# Patient Record
Sex: MALE | Race: WHITE | NOT HISPANIC OR LATINO | Employment: FULL TIME | URBAN - METROPOLITAN AREA
[De-identification: names, ages, dates, MRNs, and addresses within clinical notes are randomized per-mention and may not be internally consistent; named-entity substitution may affect disease eponyms.]

---

## 2019-01-01 ENCOUNTER — TELEPHONE (OUTPATIENT)
Dept: GASTROENTEROLOGY | Facility: CLINIC | Age: 34
End: 2019-01-01

## 2019-01-01 ENCOUNTER — DOCUMENTATION ONLY (OUTPATIENT)
Dept: TRANSPLANT | Facility: CLINIC | Age: 34
End: 2019-01-01

## 2019-01-01 ENCOUNTER — TELEPHONE (OUTPATIENT)
Dept: TRANSPLANT | Facility: CLINIC | Age: 34
End: 2019-01-01

## 2019-01-01 ENCOUNTER — HOSPITAL ENCOUNTER (INPATIENT)
Facility: HOSPITAL | Age: 34
LOS: 14 days | DRG: 871 | End: 2019-12-09
Attending: HOSPITALIST | Admitting: HOSPITALIST
Payer: COMMERCIAL

## 2019-01-01 ENCOUNTER — COMMITTEE REVIEW (OUTPATIENT)
Dept: TRANSPLANT | Facility: CLINIC | Age: 34
End: 2019-01-01

## 2019-01-01 VITALS
OXYGEN SATURATION: 91 % | HEIGHT: 74 IN | TEMPERATURE: 98 F | DIASTOLIC BLOOD PRESSURE: 101 MMHG | WEIGHT: 285.06 LBS | BODY MASS INDEX: 36.58 KG/M2 | SYSTOLIC BLOOD PRESSURE: 134 MMHG

## 2019-01-01 DIAGNOSIS — K65.2 SPONTANEOUS BACTERIAL PERITONITIS: ICD-10-CM

## 2019-01-01 DIAGNOSIS — B37.9 INFECTION DUE TO CANDIDA GLABRATA: ICD-10-CM

## 2019-01-01 DIAGNOSIS — A41.9 SEVERE SEPSIS WITH ACUTE ORGAN DYSFUNCTION: ICD-10-CM

## 2019-01-01 DIAGNOSIS — K72.90 DECOMPENSATED HEPATIC CIRRHOSIS: Primary | ICD-10-CM

## 2019-01-01 DIAGNOSIS — K74.60 DECOMPENSATED HEPATIC CIRRHOSIS: Primary | ICD-10-CM

## 2019-01-01 DIAGNOSIS — E87.1 HYPONATREMIA: ICD-10-CM

## 2019-01-01 DIAGNOSIS — K72.00 ACUTE LIVER FAILURE WITHOUT HEPATIC COMA: ICD-10-CM

## 2019-01-01 DIAGNOSIS — N19 UREMIA: ICD-10-CM

## 2019-01-01 DIAGNOSIS — K76.7 HEPATORENAL SYNDROME: ICD-10-CM

## 2019-01-01 DIAGNOSIS — F10.20 SEVERE ALCOHOL DEPENDENCE: ICD-10-CM

## 2019-01-01 DIAGNOSIS — Z91.89 AT RISK FOR LONG QT SYNDROME: ICD-10-CM

## 2019-01-01 DIAGNOSIS — K70.31 ALCOHOLIC CIRRHOSIS OF LIVER WITH ASCITES: ICD-10-CM

## 2019-01-01 DIAGNOSIS — Z01.818 PRE-TRANSPLANT EVALUATION FOR LIVER TRANSPLANT: ICD-10-CM

## 2019-01-01 DIAGNOSIS — R00.0 TACHYCARDIA: ICD-10-CM

## 2019-01-01 DIAGNOSIS — N17.0 ACUTE RENAL FAILURE WITH TUBULAR NECROSIS: ICD-10-CM

## 2019-01-01 DIAGNOSIS — N17.9 AKI (ACUTE KIDNEY INJURY): ICD-10-CM

## 2019-01-01 DIAGNOSIS — R94.31 QT PROLONGATION: ICD-10-CM

## 2019-01-01 DIAGNOSIS — R65.20 SEVERE SEPSIS WITH ACUTE ORGAN DYSFUNCTION: ICD-10-CM

## 2019-01-01 DIAGNOSIS — Z76.82 LIVER TRANSPLANT CANDIDATE: ICD-10-CM

## 2019-01-01 DIAGNOSIS — B37.7 CANDIDEMIA: ICD-10-CM

## 2019-01-01 LAB
A1 AG RBC QL: NORMAL
ABO + RH BLD: NORMAL
AFP SERPL-MCNC: 4.1 NG/ML (ref 0–8.4)
ALBUMIN FLD-MCNC: 1.1 G/DL
ALBUMIN FLD-MCNC: 1.2 G/DL
ALBUMIN SERPL BCP-MCNC: 1.5 G/DL (ref 3.5–5.2)
ALBUMIN SERPL BCP-MCNC: 1.9 G/DL (ref 3.5–5.2)
ALBUMIN SERPL BCP-MCNC: 1.9 G/DL (ref 3.5–5.2)
ALBUMIN SERPL BCP-MCNC: 2.4 G/DL (ref 3.5–5.2)
ALBUMIN SERPL BCP-MCNC: 2.5 G/DL (ref 3.5–5.2)
ALBUMIN SERPL BCP-MCNC: 2.5 G/DL (ref 3.5–5.2)
ALBUMIN SERPL BCP-MCNC: 2.7 G/DL (ref 3.5–5.2)
ALBUMIN SERPL BCP-MCNC: 2.8 G/DL (ref 3.5–5.2)
ALBUMIN SERPL BCP-MCNC: 2.9 G/DL (ref 3.5–5.2)
ALBUMIN SERPL BCP-MCNC: 2.9 G/DL (ref 3.5–5.2)
ALBUMIN SERPL BCP-MCNC: 3 G/DL (ref 3.5–5.2)
ALBUMIN SERPL BCP-MCNC: 3 G/DL (ref 3.5–5.2)
ALBUMIN SERPL BCP-MCNC: 3.1 G/DL (ref 3.5–5.2)
ALBUMIN SERPL BCP-MCNC: 3.2 G/DL (ref 3.5–5.2)
ALBUMIN SERPL BCP-MCNC: 3.3 G/DL (ref 3.5–5.2)
ALBUMIN SERPL BCP-MCNC: 3.5 G/DL (ref 3.5–5.2)
ALLENS TEST: ABNORMAL
ALP SERPL-CCNC: 181 U/L (ref 55–135)
ALP SERPL-CCNC: 184 U/L (ref 55–135)
ALP SERPL-CCNC: 194 U/L (ref 55–135)
ALP SERPL-CCNC: 196 U/L (ref 55–135)
ALP SERPL-CCNC: 197 U/L (ref 55–135)
ALP SERPL-CCNC: 200 U/L (ref 55–135)
ALP SERPL-CCNC: 201 U/L (ref 55–135)
ALP SERPL-CCNC: 209 U/L (ref 55–135)
ALP SERPL-CCNC: 212 U/L (ref 55–135)
ALP SERPL-CCNC: 253 U/L (ref 55–135)
ALP SERPL-CCNC: 258 U/L (ref 55–135)
ALP SERPL-CCNC: 263 U/L (ref 55–135)
ALP SERPL-CCNC: 273 U/L (ref 55–135)
ALP SERPL-CCNC: 276 U/L (ref 55–135)
ALP SERPL-CCNC: 289 U/L (ref 55–135)
ALP SERPL-CCNC: 301 U/L (ref 55–135)
ALP SERPL-CCNC: 329 U/L (ref 55–135)
ALT SERPL W/O P-5'-P-CCNC: 146 U/L (ref 10–44)
ALT SERPL W/O P-5'-P-CCNC: 22 U/L (ref 10–44)
ALT SERPL W/O P-5'-P-CCNC: 25 U/L (ref 10–44)
ALT SERPL W/O P-5'-P-CCNC: 26 U/L (ref 10–44)
ALT SERPL W/O P-5'-P-CCNC: 27 U/L (ref 10–44)
ALT SERPL W/O P-5'-P-CCNC: 31 U/L (ref 10–44)
ALT SERPL W/O P-5'-P-CCNC: 32 U/L (ref 10–44)
ALT SERPL W/O P-5'-P-CCNC: 33 U/L (ref 10–44)
ALT SERPL W/O P-5'-P-CCNC: 36 U/L (ref 10–44)
ALT SERPL W/O P-5'-P-CCNC: 36 U/L (ref 10–44)
ALT SERPL W/O P-5'-P-CCNC: 39 U/L (ref 10–44)
ALT SERPL W/O P-5'-P-CCNC: 43 U/L (ref 10–44)
ALT SERPL W/O P-5'-P-CCNC: 47 U/L (ref 10–44)
ALT SERPL W/O P-5'-P-CCNC: 52 U/L (ref 10–44)
ALT SERPL W/O P-5'-P-CCNC: 52 U/L (ref 10–44)
AMMONIA PLAS-SCNC: 36 UMOL/L (ref 10–50)
AMMONIA PLAS-SCNC: 55 UMOL/L (ref 10–50)
AMPHET+METHAMPHET UR QL: NEGATIVE
AMYLASE, BODY FLUID: 290 U/L
ANION GAP SERPL CALC-SCNC: 10 MMOL/L (ref 8–16)
ANION GAP SERPL CALC-SCNC: 11 MMOL/L (ref 8–16)
ANION GAP SERPL CALC-SCNC: 12 MMOL/L (ref 8–16)
ANION GAP SERPL CALC-SCNC: 13 MMOL/L (ref 8–16)
ANION GAP SERPL CALC-SCNC: 16 MMOL/L (ref 8–16)
ANION GAP SERPL CALC-SCNC: 17 MMOL/L (ref 8–16)
ANION GAP SERPL CALC-SCNC: 18 MMOL/L (ref 8–16)
ANION GAP SERPL CALC-SCNC: 19 MMOL/L (ref 8–16)
ANION GAP SERPL CALC-SCNC: 19 MMOL/L (ref 8–16)
ANION GAP SERPL CALC-SCNC: 20 MMOL/L (ref 8–16)
ANION GAP SERPL CALC-SCNC: 20 MMOL/L (ref 8–16)
ANION GAP SERPL CALC-SCNC: 21 MMOL/L (ref 8–16)
ANION GAP SERPL CALC-SCNC: 21 MMOL/L (ref 8–16)
ANION GAP SERPL CALC-SCNC: 22 MMOL/L (ref 8–16)
ANION GAP SERPL CALC-SCNC: 22 MMOL/L (ref 8–16)
ANION GAP SERPL CALC-SCNC: 23 MMOL/L (ref 8–16)
ANION GAP SERPL CALC-SCNC: 23 MMOL/L (ref 8–16)
ANION GAP SERPL CALC-SCNC: 24 MMOL/L (ref 8–16)
ANION GAP SERPL CALC-SCNC: 25 MMOL/L (ref 8–16)
ANION GAP SERPL CALC-SCNC: 26 MMOL/L (ref 8–16)
ANISOCYTOSIS BLD QL SMEAR: ABNORMAL
ANISOCYTOSIS BLD QL SMEAR: SLIGHT
APPEARANCE FLD: NORMAL
APTT BLDCRRT: 115.2 SEC (ref 21–32)
APTT BLDCRRT: 51.7 SEC (ref 21–32)
ASCENDING AORTA: 3.45 CM
AST SERPL-CCNC: 100 U/L (ref 10–40)
AST SERPL-CCNC: 110 U/L (ref 10–40)
AST SERPL-CCNC: 1106 U/L (ref 10–40)
AST SERPL-CCNC: 111 U/L (ref 10–40)
AST SERPL-CCNC: 114 U/L (ref 10–40)
AST SERPL-CCNC: 125 U/L (ref 10–40)
AST SERPL-CCNC: 125 U/L (ref 10–40)
AST SERPL-CCNC: 138 U/L (ref 10–40)
AST SERPL-CCNC: 143 U/L (ref 10–40)
AST SERPL-CCNC: 160 U/L (ref 10–40)
AST SERPL-CCNC: 163 U/L (ref 10–40)
AST SERPL-CCNC: 222 U/L (ref 10–40)
AST SERPL-CCNC: 305 U/L (ref 10–40)
AST SERPL-CCNC: 81 U/L (ref 10–40)
AST SERPL-CCNC: 88 U/L (ref 10–40)
AST SERPL-CCNC: 94 U/L (ref 10–40)
AST SERPL-CCNC: 95 U/L (ref 10–40)
BACTERIA #/AREA URNS AUTO: ABNORMAL /HPF
BACTERIA #/AREA URNS AUTO: ABNORMAL /HPF
BACTERIA BLD CULT: ABNORMAL
BACTERIA BLD CULT: NORMAL
BACTERIA CATH TIP CULT: NO GROWTH
BACTERIA FLD CULT: NORMAL
BACTERIA SPEC AEROBE CULT: NO GROWTH
BACTERIA SPEC AEROBE CULT: NO GROWTH
BACTERIA SPEC ANAEROBE CULT: NORMAL
BACTERIA SPEC ANAEROBE CULT: NORMAL
BACTERIA UR CULT: NO GROWTH
BARBITURATES UR QL SCN>200 NG/ML: NEGATIVE
BASO STIPL BLD QL SMEAR: ABNORMAL
BASOPHILS # BLD AUTO: 0.1 K/UL (ref 0–0.2)
BASOPHILS # BLD AUTO: 0.11 K/UL (ref 0–0.2)
BASOPHILS # BLD AUTO: 0.12 K/UL (ref 0–0.2)
BASOPHILS # BLD AUTO: ABNORMAL K/UL (ref 0–0.2)
BASOPHILS NFR BLD: 0 % (ref 0–1.9)
BASOPHILS NFR BLD: 0.4 % (ref 0–1.9)
BASOPHILS NFR BLD: 0.4 % (ref 0–1.9)
BASOPHILS NFR BLD: 0.5 % (ref 0–1.9)
BASOPHILS NFR BLD: 1 % (ref 0–1.9)
BENZODIAZ UR QL SCN>200 NG/ML: NEGATIVE
BILIRUB SERPL-MCNC: 10.2 MG/DL (ref 0.1–1)
BILIRUB SERPL-MCNC: 10.3 MG/DL (ref 0.1–1)
BILIRUB SERPL-MCNC: 10.6 MG/DL (ref 0.1–1)
BILIRUB SERPL-MCNC: 11.7 MG/DL (ref 0.1–1)
BILIRUB SERPL-MCNC: 11.8 MG/DL (ref 0.1–1)
BILIRUB SERPL-MCNC: 12.2 MG/DL (ref 0.1–1)
BILIRUB SERPL-MCNC: 13.3 MG/DL (ref 0.1–1)
BILIRUB SERPL-MCNC: 16.1 MG/DL (ref 0.1–1)
BILIRUB SERPL-MCNC: 7.8 MG/DL (ref 0.1–1)
BILIRUB SERPL-MCNC: 7.9 MG/DL (ref 0.1–1)
BILIRUB SERPL-MCNC: 8.6 MG/DL (ref 0.1–1)
BILIRUB SERPL-MCNC: 8.9 MG/DL (ref 0.1–1)
BILIRUB SERPL-MCNC: 8.9 MG/DL (ref 0.1–1)
BILIRUB SERPL-MCNC: 9.1 MG/DL (ref 0.1–1)
BILIRUB SERPL-MCNC: 9.1 MG/DL (ref 0.1–1)
BILIRUB SERPL-MCNC: 9.8 MG/DL (ref 0.1–1)
BILIRUB SERPL-MCNC: 9.9 MG/DL (ref 0.1–1)
BILIRUB UR QL STRIP: ABNORMAL
BILIRUB UR QL STRIP: NEGATIVE
BLD GP AB SCN CELLS X3 SERPL QL: NORMAL
BLD PROD TYP BPU: NORMAL
BLOOD UNIT EXPIRATION DATE: NORMAL
BLOOD UNIT TYPE CODE: 6200
BLOOD UNIT TYPE: NORMAL
BODY FLD TYPE: NORMAL
BODY FLUID SOURCE AMYLASE: NORMAL
BSA FOR ECHO PROCEDURE: 2.6 M2
BUN SERPL-MCNC: 108 MG/DL (ref 6–20)
BUN SERPL-MCNC: 113 MG/DL (ref 6–20)
BUN SERPL-MCNC: 115 MG/DL (ref 6–20)
BUN SERPL-MCNC: 118 MG/DL (ref 6–20)
BUN SERPL-MCNC: 129 MG/DL (ref 6–20)
BUN SERPL-MCNC: 145 MG/DL (ref 6–20)
BUN SERPL-MCNC: 147 MG/DL (ref 6–20)
BUN SERPL-MCNC: 21 MG/DL (ref 6–20)
BUN SERPL-MCNC: 22 MG/DL (ref 6–20)
BUN SERPL-MCNC: 41 MG/DL (ref 6–20)
BUN SERPL-MCNC: 45 MG/DL (ref 6–20)
BUN SERPL-MCNC: 45 MG/DL (ref 6–20)
BUN SERPL-MCNC: 52 MG/DL (ref 6–20)
BUN SERPL-MCNC: 53 MG/DL (ref 6–20)
BUN SERPL-MCNC: 53 MG/DL (ref 6–20)
BUN SERPL-MCNC: 58 MG/DL (ref 6–20)
BUN SERPL-MCNC: 62 MG/DL (ref 6–20)
BUN SERPL-MCNC: 64 MG/DL (ref 6–20)
BUN SERPL-MCNC: 73 MG/DL (ref 6–20)
BUN SERPL-MCNC: 74 MG/DL (ref 6–20)
BUN SERPL-MCNC: 79 MG/DL (ref 6–20)
BUN SERPL-MCNC: 87 MG/DL (ref 6–20)
BUN SERPL-MCNC: 87 MG/DL (ref 6–20)
BUN SERPL-MCNC: 88 MG/DL (ref 6–20)
BUN SERPL-MCNC: 91 MG/DL (ref 6–20)
BUN SERPL-MCNC: 99 MG/DL (ref 6–20)
BURR CELLS BLD QL SMEAR: ABNORMAL
BZE UR QL SCN: NEGATIVE
CALCIUM SERPL-MCNC: 7.7 MG/DL (ref 8.7–10.5)
CALCIUM SERPL-MCNC: 7.7 MG/DL (ref 8.7–10.5)
CALCIUM SERPL-MCNC: 8 MG/DL (ref 8.7–10.5)
CALCIUM SERPL-MCNC: 8.1 MG/DL (ref 8.7–10.5)
CALCIUM SERPL-MCNC: 8.1 MG/DL (ref 8.7–10.5)
CALCIUM SERPL-MCNC: 8.2 MG/DL (ref 8.7–10.5)
CALCIUM SERPL-MCNC: 8.2 MG/DL (ref 8.7–10.5)
CALCIUM SERPL-MCNC: 8.3 MG/DL (ref 8.7–10.5)
CALCIUM SERPL-MCNC: 8.4 MG/DL (ref 8.7–10.5)
CALCIUM SERPL-MCNC: 8.5 MG/DL (ref 8.7–10.5)
CALCIUM SERPL-MCNC: 8.6 MG/DL (ref 8.7–10.5)
CALCIUM SERPL-MCNC: 8.7 MG/DL (ref 8.7–10.5)
CALCIUM SERPL-MCNC: 8.9 MG/DL (ref 8.7–10.5)
CALCIUM SERPL-MCNC: 9.1 MG/DL (ref 8.7–10.5)
CANNABINOIDS UR QL SCN: NEGATIVE
CHLORIDE SERPL-SCNC: 100 MMOL/L (ref 95–110)
CHLORIDE SERPL-SCNC: 101 MMOL/L (ref 95–110)
CHLORIDE SERPL-SCNC: 102 MMOL/L (ref 95–110)
CHLORIDE SERPL-SCNC: 104 MMOL/L (ref 95–110)
CHLORIDE SERPL-SCNC: 104 MMOL/L (ref 95–110)
CHLORIDE SERPL-SCNC: 91 MMOL/L (ref 95–110)
CHLORIDE SERPL-SCNC: 91 MMOL/L (ref 95–110)
CHLORIDE SERPL-SCNC: 92 MMOL/L (ref 95–110)
CHLORIDE SERPL-SCNC: 93 MMOL/L (ref 95–110)
CHLORIDE SERPL-SCNC: 94 MMOL/L (ref 95–110)
CHLORIDE SERPL-SCNC: 95 MMOL/L (ref 95–110)
CHLORIDE SERPL-SCNC: 95 MMOL/L (ref 95–110)
CHLORIDE SERPL-SCNC: 96 MMOL/L (ref 95–110)
CHLORIDE SERPL-SCNC: 98 MMOL/L (ref 95–110)
CHLORIDE SERPL-SCNC: 98 MMOL/L (ref 95–110)
CHLORIDE SERPL-SCNC: 99 MMOL/L (ref 95–110)
CK SERPL-CCNC: 28 U/L (ref 20–200)
CLARITY UR REFRACT.AUTO: ABNORMAL
CLARITY UR REFRACT.AUTO: ABNORMAL
CMV IGG SERPL QL IA: REACTIVE
CO2 SERPL-SCNC: 11 MMOL/L (ref 23–29)
CO2 SERPL-SCNC: 12 MMOL/L (ref 23–29)
CO2 SERPL-SCNC: 12 MMOL/L (ref 23–29)
CO2 SERPL-SCNC: 14 MMOL/L (ref 23–29)
CO2 SERPL-SCNC: 14 MMOL/L (ref 23–29)
CO2 SERPL-SCNC: 15 MMOL/L (ref 23–29)
CO2 SERPL-SCNC: 15 MMOL/L (ref 23–29)
CO2 SERPL-SCNC: 16 MMOL/L (ref 23–29)
CO2 SERPL-SCNC: 16 MMOL/L (ref 23–29)
CO2 SERPL-SCNC: 18 MMOL/L (ref 23–29)
CO2 SERPL-SCNC: 19 MMOL/L (ref 23–29)
CO2 SERPL-SCNC: 19 MMOL/L (ref 23–29)
CO2 SERPL-SCNC: 20 MMOL/L (ref 23–29)
CO2 SERPL-SCNC: 21 MMOL/L (ref 23–29)
CO2 SERPL-SCNC: 21 MMOL/L (ref 23–29)
CO2 SERPL-SCNC: 23 MMOL/L (ref 23–29)
CO2 SERPL-SCNC: 23 MMOL/L (ref 23–29)
CO2 SERPL-SCNC: 24 MMOL/L (ref 23–29)
CO2 SERPL-SCNC: 24 MMOL/L (ref 23–29)
CO2 SERPL-SCNC: 25 MMOL/L (ref 23–29)
CO2 SERPL-SCNC: 26 MMOL/L (ref 23–29)
CO2 SERPL-SCNC: 8 MMOL/L (ref 23–29)
CODING SYSTEM: NORMAL
COLOR FLD: NORMAL
COLOR FLD: YELLOW
COLOR FLD: YELLOW
COLOR UR AUTO: ABNORMAL
COLOR UR AUTO: ABNORMAL
CREAT SERPL-MCNC: 10 MG/DL (ref 0.5–1.4)
CREAT SERPL-MCNC: 10.5 MG/DL (ref 0.5–1.4)
CREAT SERPL-MCNC: 11.8 MG/DL (ref 0.5–1.4)
CREAT SERPL-MCNC: 12.3 MG/DL (ref 0.5–1.4)
CREAT SERPL-MCNC: 2 MG/DL (ref 0.5–1.4)
CREAT SERPL-MCNC: 2.1 MG/DL (ref 0.5–1.4)
CREAT SERPL-MCNC: 3.1 MG/DL (ref 0.5–1.4)
CREAT SERPL-MCNC: 3.2 MG/DL (ref 0.5–1.4)
CREAT SERPL-MCNC: 3.4 MG/DL (ref 0.5–1.4)
CREAT SERPL-MCNC: 3.6 MG/DL (ref 0.5–1.4)
CREAT SERPL-MCNC: 3.6 MG/DL (ref 0.5–1.4)
CREAT SERPL-MCNC: 3.9 MG/DL (ref 0.5–1.4)
CREAT SERPL-MCNC: 4.3 MG/DL (ref 0.5–1.4)
CREAT SERPL-MCNC: 4.4 MG/DL (ref 0.5–1.4)
CREAT SERPL-MCNC: 4.4 MG/DL (ref 0.5–1.4)
CREAT SERPL-MCNC: 5 MG/DL (ref 0.5–1.4)
CREAT SERPL-MCNC: 5.3 MG/DL (ref 0.5–1.4)
CREAT SERPL-MCNC: 5.5 MG/DL (ref 0.5–1.4)
CREAT SERPL-MCNC: 5.6 MG/DL (ref 0.5–1.4)
CREAT SERPL-MCNC: 5.9 MG/DL (ref 0.5–1.4)
CREAT SERPL-MCNC: 6 MG/DL (ref 0.5–1.4)
CREAT SERPL-MCNC: 7.1 MG/DL (ref 0.5–1.4)
CREAT SERPL-MCNC: 7.3 MG/DL (ref 0.5–1.4)
CREAT SERPL-MCNC: 8.3 MG/DL (ref 0.5–1.4)
CREAT SERPL-MCNC: 9.7 MG/DL (ref 0.5–1.4)
CREAT SERPL-MCNC: 9.9 MG/DL (ref 0.5–1.4)
CREAT UR-MCNC: 192 MG/DL (ref 23–375)
CV ECHO LV RWT: 0.23 CM
DACRYOCYTES BLD QL SMEAR: ABNORMAL
DELSYS: ABNORMAL
DIFFERENTIAL METHOD: ABNORMAL
DISPENSE STATUS: NORMAL
DOHLE BOD BLD QL SMEAR: PRESENT
DOP CALC LVOT AREA: 4.3 CM2
DOP CALC LVOT DIAMETER: 2.33 CM
DOP CALC LVOT PEAK VEL: 1.48 M/S
DOP CALC LVOT STROKE VOLUME: 83.44 CM3
DOP CALCLVOT PEAK VEL VTI: 19.58 CM
E WAVE DECELERATION TIME: 100.04 MSEC
E/A RATIO: 1.54
E/E' RATIO: 9.52 M/S
EBV VCA IGG SER QL IA: POSITIVE
ECHO LV POSTERIOR WALL: 0.69 CM (ref 0.6–1.1)
EOSINOPHIL # BLD AUTO: 0.8 K/UL (ref 0–0.5)
EOSINOPHIL # BLD AUTO: 0.9 K/UL (ref 0–0.5)
EOSINOPHIL # BLD AUTO: 1 K/UL (ref 0–0.5)
EOSINOPHIL # BLD AUTO: ABNORMAL K/UL (ref 0–0.5)
EOSINOPHIL NFR BLD: 0 % (ref 0–8)
EOSINOPHIL NFR BLD: 1 % (ref 0–8)
EOSINOPHIL NFR BLD: 1.5 % (ref 0–8)
EOSINOPHIL NFR BLD: 2 % (ref 0–8)
EOSINOPHIL NFR BLD: 2.5 % (ref 0–8)
EOSINOPHIL NFR BLD: 3 % (ref 0–8)
EOSINOPHIL NFR BLD: 3.1 % (ref 0–8)
EOSINOPHIL NFR BLD: 4 % (ref 0–8)
EOSINOPHIL NFR BLD: 4.7 % (ref 0–8)
EOSINOPHIL NFR BLD: 5 % (ref 0–8)
EOSINOPHIL NFR BLD: 5 % (ref 0–8)
EOSINOPHIL NFR BLD: 6 % (ref 0–8)
EOSINOPHIL NFR FLD MANUAL: 2 %
EOSINOPHIL URNS QL WRIGHT STN: NORMAL
ERYTHROCYTE [DISTWIDTH] IN BLOOD BY AUTOMATED COUNT: 13.7 % (ref 11.5–14.5)
ERYTHROCYTE [DISTWIDTH] IN BLOOD BY AUTOMATED COUNT: 13.8 % (ref 11.5–14.5)
ERYTHROCYTE [DISTWIDTH] IN BLOOD BY AUTOMATED COUNT: 13.9 % (ref 11.5–14.5)
ERYTHROCYTE [DISTWIDTH] IN BLOOD BY AUTOMATED COUNT: 14 % (ref 11.5–14.5)
ERYTHROCYTE [DISTWIDTH] IN BLOOD BY AUTOMATED COUNT: 14.1 % (ref 11.5–14.5)
ERYTHROCYTE [DISTWIDTH] IN BLOOD BY AUTOMATED COUNT: 14.3 % (ref 11.5–14.5)
ERYTHROCYTE [DISTWIDTH] IN BLOOD BY AUTOMATED COUNT: 14.4 % (ref 11.5–14.5)
ERYTHROCYTE [DISTWIDTH] IN BLOOD BY AUTOMATED COUNT: 14.5 % (ref 11.5–14.5)
ERYTHROCYTE [DISTWIDTH] IN BLOOD BY AUTOMATED COUNT: 15.2 % (ref 11.5–14.5)
ERYTHROCYTE [DISTWIDTH] IN BLOOD BY AUTOMATED COUNT: 15.8 % (ref 11.5–14.5)
ERYTHROCYTE [DISTWIDTH] IN BLOOD BY AUTOMATED COUNT: 15.8 % (ref 11.5–14.5)
ERYTHROCYTE [DISTWIDTH] IN BLOOD BY AUTOMATED COUNT: 15.9 % (ref 11.5–14.5)
ERYTHROCYTE [DISTWIDTH] IN BLOOD BY AUTOMATED COUNT: 16.8 % (ref 11.5–14.5)
ERYTHROCYTE [DISTWIDTH] IN BLOOD BY AUTOMATED COUNT: 18.7 % (ref 11.5–14.5)
ERYTHROCYTE [DISTWIDTH] IN BLOOD BY AUTOMATED COUNT: 18.7 % (ref 11.5–14.5)
ERYTHROCYTE [DISTWIDTH] IN BLOOD BY AUTOMATED COUNT: 18.9 % (ref 11.5–14.5)
ERYTHROCYTE [DISTWIDTH] IN BLOOD BY AUTOMATED COUNT: 19.7 % (ref 11.5–14.5)
ERYTHROCYTE [DISTWIDTH] IN BLOOD BY AUTOMATED COUNT: 20.3 % (ref 11.5–14.5)
ERYTHROCYTE [DISTWIDTH] IN BLOOD BY AUTOMATED COUNT: 21 % (ref 11.5–14.5)
ERYTHROCYTE [DISTWIDTH] IN BLOOD BY AUTOMATED COUNT: 22 % (ref 11.5–14.5)
ERYTHROCYTE [DISTWIDTH] IN BLOOD BY AUTOMATED COUNT: 22.3 % (ref 11.5–14.5)
ERYTHROCYTE [DISTWIDTH] IN BLOOD BY AUTOMATED COUNT: 22.4 % (ref 11.5–14.5)
ERYTHROCYTE [DISTWIDTH] IN BLOOD BY AUTOMATED COUNT: 22.6 % (ref 11.5–14.5)
ERYTHROCYTE [DISTWIDTH] IN BLOOD BY AUTOMATED COUNT: 23 % (ref 11.5–14.5)
ERYTHROCYTE [DISTWIDTH] IN BLOOD BY AUTOMATED COUNT: 23.5 % (ref 11.5–14.5)
ERYTHROCYTE [SEDIMENTATION RATE] IN BLOOD BY WESTERGREN METHOD: 16 MM/H
ERYTHROCYTE [SEDIMENTATION RATE] IN BLOOD BY WESTERGREN METHOD: 20 MM/H
ERYTHROCYTE [SEDIMENTATION RATE] IN BLOOD BY WESTERGREN METHOD: 22 MM/H
ERYTHROCYTE [SEDIMENTATION RATE] IN BLOOD BY WESTERGREN METHOD: 26 MM/H
ERYTHROCYTE [SEDIMENTATION RATE] IN BLOOD BY WESTERGREN METHOD: 26 MM/H
EST. GFR  (AFRICAN AMERICAN): 10.2 ML/MIN/1.73 M^2
EST. GFR  (AFRICAN AMERICAN): 10.6 ML/MIN/1.73 M^2
EST. GFR  (AFRICAN AMERICAN): 13 ML/MIN/1.73 M^2
EST. GFR  (AFRICAN AMERICAN): 13.2 ML/MIN/1.73 M^2
EST. GFR  (AFRICAN AMERICAN): 14.1 ML/MIN/1.73 M^2
EST. GFR  (AFRICAN AMERICAN): 14.4 ML/MIN/1.73 M^2
EST. GFR  (AFRICAN AMERICAN): 15 ML/MIN/1.73 M^2
EST. GFR  (AFRICAN AMERICAN): 16.1 ML/MIN/1.73 M^2
EST. GFR  (AFRICAN AMERICAN): 18.8 ML/MIN/1.73 M^2
EST. GFR  (AFRICAN AMERICAN): 18.8 ML/MIN/1.73 M^2
EST. GFR  (AFRICAN AMERICAN): 19.4 ML/MIN/1.73 M^2
EST. GFR  (AFRICAN AMERICAN): 21.8 ML/MIN/1.73 M^2
EST. GFR  (AFRICAN AMERICAN): 24 ML/MIN/1.73 M^2
EST. GFR  (AFRICAN AMERICAN): 24 ML/MIN/1.73 M^2
EST. GFR  (AFRICAN AMERICAN): 25.7 ML/MIN/1.73 M^2
EST. GFR  (AFRICAN AMERICAN): 27.7 ML/MIN/1.73 M^2
EST. GFR  (AFRICAN AMERICAN): 28.8 ML/MIN/1.73 M^2
EST. GFR  (AFRICAN AMERICAN): 46.1 ML/MIN/1.73 M^2
EST. GFR  (AFRICAN AMERICAN): 48.9 ML/MIN/1.73 M^2
EST. GFR  (AFRICAN AMERICAN): 5.4 ML/MIN/1.73 M^2
EST. GFR  (AFRICAN AMERICAN): 5.7 ML/MIN/1.73 M^2
EST. GFR  (AFRICAN AMERICAN): 6.6 ML/MIN/1.73 M^2
EST. GFR  (AFRICAN AMERICAN): 7 ML/MIN/1.73 M^2
EST. GFR  (AFRICAN AMERICAN): 7.1 ML/MIN/1.73 M^2
EST. GFR  (AFRICAN AMERICAN): 7.2 ML/MIN/1.73 M^2
EST. GFR  (AFRICAN AMERICAN): 8.7 ML/MIN/1.73 M^2
EST. GFR  (NON AFRICAN AMERICAN): 11.2 ML/MIN/1.73 M^2
EST. GFR  (NON AFRICAN AMERICAN): 11.4 ML/MIN/1.73 M^2
EST. GFR  (NON AFRICAN AMERICAN): 12.2 ML/MIN/1.73 M^2
EST. GFR  (NON AFRICAN AMERICAN): 12.4 ML/MIN/1.73 M^2
EST. GFR  (NON AFRICAN AMERICAN): 13 ML/MIN/1.73 M^2
EST. GFR  (NON AFRICAN AMERICAN): 14 ML/MIN/1.73 M^2
EST. GFR  (NON AFRICAN AMERICAN): 16.3 ML/MIN/1.73 M^2
EST. GFR  (NON AFRICAN AMERICAN): 16.3 ML/MIN/1.73 M^2
EST. GFR  (NON AFRICAN AMERICAN): 16.8 ML/MIN/1.73 M^2
EST. GFR  (NON AFRICAN AMERICAN): 18.9 ML/MIN/1.73 M^2
EST. GFR  (NON AFRICAN AMERICAN): 20.8 ML/MIN/1.73 M^2
EST. GFR  (NON AFRICAN AMERICAN): 20.8 ML/MIN/1.73 M^2
EST. GFR  (NON AFRICAN AMERICAN): 22.3 ML/MIN/1.73 M^2
EST. GFR  (NON AFRICAN AMERICAN): 24 ML/MIN/1.73 M^2
EST. GFR  (NON AFRICAN AMERICAN): 24.9 ML/MIN/1.73 M^2
EST. GFR  (NON AFRICAN AMERICAN): 39.9 ML/MIN/1.73 M^2
EST. GFR  (NON AFRICAN AMERICAN): 4.7 ML/MIN/1.73 M^2
EST. GFR  (NON AFRICAN AMERICAN): 4.9 ML/MIN/1.73 M^2
EST. GFR  (NON AFRICAN AMERICAN): 42.3 ML/MIN/1.73 M^2
EST. GFR  (NON AFRICAN AMERICAN): 5.7 ML/MIN/1.73 M^2
EST. GFR  (NON AFRICAN AMERICAN): 6 ML/MIN/1.73 M^2
EST. GFR  (NON AFRICAN AMERICAN): 6.1 ML/MIN/1.73 M^2
EST. GFR  (NON AFRICAN AMERICAN): 6.3 ML/MIN/1.73 M^2
EST. GFR  (NON AFRICAN AMERICAN): 7.6 ML/MIN/1.73 M^2
EST. GFR  (NON AFRICAN AMERICAN): 8.8 ML/MIN/1.73 M^2
EST. GFR  (NON AFRICAN AMERICAN): 9.1 ML/MIN/1.73 M^2
ETHANOL UR-MCNC: <10 MG/DL
FERRITIN SERPL-MCNC: 2782 NG/ML (ref 20–300)
FIBRINOGEN PPP-MCNC: 262 MG/DL (ref 182–366)
FIBRINOGEN PPP-MCNC: 297 MG/DL (ref 182–366)
FIO2: 100
FLOW: 3
FOLATE SERPL-MCNC: 7.6 NG/ML (ref 4–24)
FRACTIONAL SHORTENING: 50 % (ref 28–44)
GAMMA INTERFERON BACKGROUND BLD IA-ACNC: 0.02 IU/ML
GIANT PLATELETS BLD QL SMEAR: PRESENT
GLUCOSE SERPL-MCNC: 102 MG/DL (ref 70–110)
GLUCOSE SERPL-MCNC: 105 MG/DL (ref 70–110)
GLUCOSE SERPL-MCNC: 108 MG/DL (ref 70–110)
GLUCOSE SERPL-MCNC: 108 MG/DL (ref 70–110)
GLUCOSE SERPL-MCNC: 111 MG/DL (ref 70–110)
GLUCOSE SERPL-MCNC: 112 MG/DL (ref 70–110)
GLUCOSE SERPL-MCNC: 115 MG/DL (ref 70–110)
GLUCOSE SERPL-MCNC: 117 MG/DL (ref 70–110)
GLUCOSE SERPL-MCNC: 117 MG/DL (ref 70–110)
GLUCOSE SERPL-MCNC: 118 MG/DL (ref 70–110)
GLUCOSE SERPL-MCNC: 119 MG/DL (ref 70–110)
GLUCOSE SERPL-MCNC: 121 MG/DL (ref 70–110)
GLUCOSE SERPL-MCNC: 122 MG/DL (ref 70–110)
GLUCOSE SERPL-MCNC: 124 MG/DL (ref 70–110)
GLUCOSE SERPL-MCNC: 134 MG/DL (ref 70–110)
GLUCOSE SERPL-MCNC: 135 MG/DL (ref 70–110)
GLUCOSE SERPL-MCNC: 136 MG/DL (ref 70–110)
GLUCOSE SERPL-MCNC: 19 MG/DL (ref 70–110)
GLUCOSE SERPL-MCNC: 19 MG/DL (ref 70–110)
GLUCOSE SERPL-MCNC: 58 MG/DL (ref 70–110)
GLUCOSE SERPL-MCNC: 94 MG/DL (ref 70–110)
GLUCOSE SERPL-MCNC: 95 MG/DL (ref 70–110)
GLUCOSE SERPL-MCNC: 97 MG/DL (ref 70–110)
GLUCOSE SERPL-MCNC: 98 MG/DL (ref 70–110)
GLUCOSE SERPL-MCNC: 98 MG/DL (ref 70–110)
GLUCOSE UR QL STRIP: NEGATIVE
GLUCOSE UR QL STRIP: NEGATIVE
GRAM STN SPEC: NORMAL
GRAN CASTS UR QL COMP ASSIST: 3 /LPF
HAPTOGLOB SERPL-MCNC: 82 MG/DL (ref 30–250)
HBV CORE AB SERPL QL IA: NEGATIVE
HBV SURFACE AB SER-ACNC: NEGATIVE M[IU]/ML
HBV SURFACE AG SERPL QL IA: NEGATIVE
HCO3 UR-SCNC: 10.2 MMOL/L (ref 24–28)
HCO3 UR-SCNC: 10.5 MMOL/L (ref 24–28)
HCO3 UR-SCNC: 11.4 MMOL/L (ref 24–28)
HCO3 UR-SCNC: 11.8 MMOL/L (ref 24–28)
HCO3 UR-SCNC: 12.6 MMOL/L (ref 24–28)
HCO3 UR-SCNC: 12.8 MMOL/L (ref 24–28)
HCO3 UR-SCNC: 13.4 MMOL/L (ref 24–28)
HCO3 UR-SCNC: 16 MMOL/L (ref 24–28)
HCO3 UR-SCNC: 17.6 MMOL/L (ref 24–28)
HCO3 UR-SCNC: 19.6 MMOL/L (ref 24–28)
HCO3 UR-SCNC: 21.2 MMOL/L (ref 24–28)
HCT VFR BLD AUTO: 18.1 % (ref 40–54)
HCT VFR BLD AUTO: 18.6 % (ref 40–54)
HCT VFR BLD AUTO: 19.5 % (ref 40–54)
HCT VFR BLD AUTO: 20.7 % (ref 40–54)
HCT VFR BLD AUTO: 21 % (ref 40–54)
HCT VFR BLD AUTO: 21.6 % (ref 40–54)
HCT VFR BLD AUTO: 21.7 % (ref 40–54)
HCT VFR BLD AUTO: 22.4 % (ref 40–54)
HCT VFR BLD AUTO: 22.9 % (ref 40–54)
HCT VFR BLD AUTO: 23.3 % (ref 40–54)
HCT VFR BLD AUTO: 23.5 % (ref 40–54)
HCT VFR BLD AUTO: 23.9 % (ref 40–54)
HCT VFR BLD AUTO: 24.3 % (ref 40–54)
HCT VFR BLD AUTO: 24.5 % (ref 40–54)
HCT VFR BLD AUTO: 24.8 % (ref 40–54)
HCT VFR BLD AUTO: 24.9 % (ref 40–54)
HCT VFR BLD AUTO: 25 % (ref 40–54)
HCT VFR BLD AUTO: 25.7 % (ref 40–54)
HCT VFR BLD AUTO: 25.8 % (ref 40–54)
HCT VFR BLD AUTO: 26 % (ref 40–54)
HCT VFR BLD AUTO: 26.8 % (ref 40–54)
HCT VFR BLD AUTO: 27.3 % (ref 40–54)
HCT VFR BLD AUTO: 27.6 % (ref 40–54)
HCT VFR BLD AUTO: 30.3 % (ref 40–54)
HCT VFR BLD AUTO: 34.5 % (ref 40–54)
HCT VFR BLD CALC: 16 %PCV (ref 36–54)
HCT VFR BLD CALC: 18 %PCV (ref 36–54)
HCT VFR BLD CALC: 22 %PCV (ref 36–54)
HCV AB SERPL QL IA: NEGATIVE
HEPATITIS A ANTIBODY, IGG: POSITIVE
HGB BLD-MCNC: 10.1 G/DL (ref 14–18)
HGB BLD-MCNC: 11.4 G/DL (ref 14–18)
HGB BLD-MCNC: 5.6 G/DL (ref 14–18)
HGB BLD-MCNC: 5.7 G/DL (ref 14–18)
HGB BLD-MCNC: 5.8 G/DL (ref 14–18)
HGB BLD-MCNC: 6.3 G/DL (ref 14–18)
HGB BLD-MCNC: 6.4 G/DL (ref 14–18)
HGB BLD-MCNC: 6.8 G/DL (ref 14–18)
HGB BLD-MCNC: 6.9 G/DL (ref 14–18)
HGB BLD-MCNC: 6.9 G/DL (ref 14–18)
HGB BLD-MCNC: 7.4 G/DL (ref 14–18)
HGB BLD-MCNC: 7.6 G/DL (ref 14–18)
HGB BLD-MCNC: 7.6 G/DL (ref 14–18)
HGB BLD-MCNC: 7.8 G/DL (ref 14–18)
HGB BLD-MCNC: 7.9 G/DL (ref 14–18)
HGB BLD-MCNC: 8 G/DL (ref 14–18)
HGB BLD-MCNC: 8.6 G/DL (ref 14–18)
HGB BLD-MCNC: 8.7 G/DL (ref 14–18)
HGB BLD-MCNC: 8.9 G/DL (ref 14–18)
HGB BLD-MCNC: 8.9 G/DL (ref 14–18)
HGB UR QL STRIP: ABNORMAL
HGB UR QL STRIP: ABNORMAL
HIV 1+2 AB+HIV1 P24 AG SERPL QL IA: NEGATIVE
HOWELL-JOLLY BOD BLD QL SMEAR: ABNORMAL
HYALINE CASTS UR QL AUTO: 72 /LPF
HYALINE CASTS UR QL AUTO: 9 /LPF
HYPOCHROMIA BLD QL SMEAR: ABNORMAL
IMM GRANULOCYTES # BLD AUTO: 0.94 K/UL (ref 0–0.04)
IMM GRANULOCYTES # BLD AUTO: 1.19 K/UL (ref 0–0.04)
IMM GRANULOCYTES # BLD AUTO: 1.23 K/UL (ref 0–0.04)
IMM GRANULOCYTES # BLD AUTO: ABNORMAL K/UL (ref 0–0.04)
IMM GRANULOCYTES NFR BLD AUTO: 4.3 % (ref 0–0.5)
IMM GRANULOCYTES NFR BLD AUTO: 4.5 % (ref 0–0.5)
IMM GRANULOCYTES NFR BLD AUTO: 4.5 % (ref 0–0.5)
IMM GRANULOCYTES NFR BLD AUTO: ABNORMAL % (ref 0–0.5)
INR PPP: 1.3 (ref 0.8–1.2)
INR PPP: 1.3 (ref 0.8–1.2)
INR PPP: 1.4 (ref 0.8–1.2)
INR PPP: 1.5 (ref 0.8–1.2)
INR PPP: 1.7 (ref 0.8–1.2)
INR PPP: 1.7 (ref 0.8–1.2)
INR PPP: 2 (ref 0.8–1.2)
INR PPP: 2.2 (ref 0.8–1.2)
INR PPP: 2.3 (ref 0.8–1.2)
INR PPP: 2.4 (ref 0.8–1.2)
INR PPP: 2.8 (ref 0.8–1.2)
INTERVENTRICULAR SEPTUM: 0.72 CM (ref 0.6–1.1)
IRON SERPL-MCNC: 36 UG/DL (ref 45–160)
IVRT: 0.07 MSEC
KETONES UR QL STRIP: NEGATIVE
KETONES UR QL STRIP: NEGATIVE
LA MAJOR: 5.1 CM
LA MINOR: 5.29 CM
LA WIDTH: 4.33 CM
LACTATE SERPL-SCNC: 1.1 MMOL/L (ref 0.5–2.2)
LACTATE SERPL-SCNC: 1.9 MMOL/L (ref 0.5–2.2)
LACTATE SERPL-SCNC: 11.1 MMOL/L (ref 0.5–2.2)
LACTATE SERPL-SCNC: 11.4 MMOL/L (ref 0.5–2.2)
LACTATE SERPL-SCNC: 11.7 MMOL/L (ref 0.5–2.2)
LACTATE SERPL-SCNC: 11.7 MMOL/L (ref 0.5–2.2)
LACTATE SERPL-SCNC: 9.3 MMOL/L (ref 0.5–2.2)
LACTATE SERPL-SCNC: >12 MMOL/L (ref 0.5–2.2)
LACTATE SERPL-SCNC: >12 MMOL/L (ref 0.5–2.2)
LDH SERPL L TO P-CCNC: 11.03 MMOL/L (ref 0.36–1.25)
LDH SERPL L TO P-CCNC: 12.06 MMOL/L (ref 0.36–1.25)
LDH SERPL L TO P-CCNC: 12.97 MMOL/L (ref 0.36–1.25)
LDH SERPL L TO P-CCNC: 17.47 MMOL/L (ref 0.36–1.25)
LDH SERPL L TO P-CCNC: 18.23 MMOL/L (ref 0.36–1.25)
LDH SERPL L TO P-CCNC: 18.96 MMOL/L (ref 0.36–1.25)
LDH SERPL L TO P-CCNC: 19.26 MMOL/L (ref 0.36–1.25)
LDH SERPL L TO P-CCNC: 19.95 MMOL/L (ref 0.36–1.25)
LDH SERPL L TO P-CCNC: 296 U/L (ref 110–260)
LDH SERPL L TO P-CCNC: 373 U/L (ref 110–260)
LEFT ATRIUM SIZE: 4.06 CM
LEFT ATRIUM VOLUME INDEX: 30.7 ML/M2
LEFT ATRIUM VOLUME: 77.6 CM3
LEFT INTERNAL DIMENSION IN SYSTOLE: 3.06 CM (ref 2.1–4)
LEFT VENTRICLE DIASTOLIC VOLUME INDEX: 74.81 ML/M2
LEFT VENTRICLE DIASTOLIC VOLUME: 188.98 ML
LEFT VENTRICLE MASS INDEX: 66 G/M2
LEFT VENTRICLE SYSTOLIC VOLUME INDEX: 14.6 ML/M2
LEFT VENTRICLE SYSTOLIC VOLUME: 36.78 ML
LEFT VENTRICULAR INTERNAL DIMENSION IN DIASTOLE: 6.13 CM (ref 3.5–6)
LEFT VENTRICULAR MASS: 165.6 G
LEUKOCYTE ESTERASE UR QL STRIP: ABNORMAL
LEUKOCYTE ESTERASE UR QL STRIP: ABNORMAL
LIPASE FLD-CCNC: 2893 U/L
LIPASE SERPL-CCNC: 371 U/L (ref 4–60)
LIPASE SERPL-CCNC: 453 U/L (ref 4–60)
LIPASE SERPL-CCNC: 542 U/L (ref 4–60)
LV LATERAL E/E' RATIO: 9.09 M/S
LV SEPTAL E/E' RATIO: 10 M/S
LYMPHOCYTES # BLD AUTO: 2.4 K/UL (ref 1–4.8)
LYMPHOCYTES # BLD AUTO: 2.4 K/UL (ref 1–4.8)
LYMPHOCYTES # BLD AUTO: 2.6 K/UL (ref 1–4.8)
LYMPHOCYTES # BLD AUTO: ABNORMAL K/UL (ref 1–4.8)
LYMPHOCYTES NFR BLD: 11 % (ref 18–48)
LYMPHOCYTES NFR BLD: 11.6 % (ref 18–48)
LYMPHOCYTES NFR BLD: 12 % (ref 18–48)
LYMPHOCYTES NFR BLD: 12 % (ref 18–48)
LYMPHOCYTES NFR BLD: 13 % (ref 18–48)
LYMPHOCYTES NFR BLD: 13 % (ref 18–48)
LYMPHOCYTES NFR BLD: 14 % (ref 18–48)
LYMPHOCYTES NFR BLD: 14 % (ref 18–48)
LYMPHOCYTES NFR BLD: 17 % (ref 18–48)
LYMPHOCYTES NFR BLD: 20 % (ref 18–48)
LYMPHOCYTES NFR BLD: 21 % (ref 18–48)
LYMPHOCYTES NFR BLD: 21 % (ref 18–48)
LYMPHOCYTES NFR BLD: 26 % (ref 18–48)
LYMPHOCYTES NFR BLD: 4 % (ref 18–48)
LYMPHOCYTES NFR BLD: 5.5 % (ref 18–48)
LYMPHOCYTES NFR BLD: 6.5 % (ref 18–48)
LYMPHOCYTES NFR BLD: 8 % (ref 18–48)
LYMPHOCYTES NFR BLD: 8 % (ref 18–48)
LYMPHOCYTES NFR BLD: 8.7 % (ref 18–48)
LYMPHOCYTES NFR BLD: 8.8 % (ref 18–48)
LYMPHOCYTES NFR BLD: 9 % (ref 18–48)
LYMPHOCYTES NFR BLD: 9 % (ref 18–48)
LYMPHOCYTES NFR FLD MANUAL: 13 %
LYMPHOCYTES NFR FLD MANUAL: 19 %
LYMPHOCYTES NFR FLD MANUAL: 36 %
M TB IFN-G CD4+ BCKGRND COR BLD-ACNC: 0 IU/ML
MAGNESIUM SERPL-MCNC: 2.2 MG/DL (ref 1.6–2.6)
MAGNESIUM SERPL-MCNC: 2.3 MG/DL (ref 1.6–2.6)
MAGNESIUM SERPL-MCNC: 2.4 MG/DL (ref 1.6–2.6)
MAGNESIUM SERPL-MCNC: 2.5 MG/DL (ref 1.6–2.6)
MAGNESIUM SERPL-MCNC: 2.6 MG/DL (ref 1.6–2.6)
MAGNESIUM SERPL-MCNC: 2.7 MG/DL (ref 1.6–2.6)
MAGNESIUM SERPL-MCNC: 2.8 MG/DL (ref 1.6–2.6)
MAGNESIUM SERPL-MCNC: 3 MG/DL (ref 1.6–2.6)
MAGNESIUM SERPL-MCNC: 3.1 MG/DL (ref 1.6–2.6)
MAGNESIUM SERPL-MCNC: 3.1 MG/DL (ref 1.6–2.6)
MAGNESIUM SERPL-MCNC: 3.2 MG/DL (ref 1.6–2.6)
MAGNESIUM SERPL-MCNC: 3.4 MG/DL (ref 1.6–2.6)
MCH RBC QN AUTO: 32 PG (ref 27–31)
MCH RBC QN AUTO: 32.2 PG (ref 27–31)
MCH RBC QN AUTO: 32.4 PG (ref 27–31)
MCH RBC QN AUTO: 32.9 PG (ref 27–31)
MCH RBC QN AUTO: 33.2 PG (ref 27–31)
MCH RBC QN AUTO: 33.6 PG (ref 27–31)
MCH RBC QN AUTO: 34.1 PG (ref 27–31)
MCH RBC QN AUTO: 34.4 PG (ref 27–31)
MCH RBC QN AUTO: 35.2 PG (ref 27–31)
MCH RBC QN AUTO: 35.8 PG (ref 27–31)
MCH RBC QN AUTO: 35.9 PG (ref 27–31)
MCH RBC QN AUTO: 35.9 PG (ref 27–31)
MCH RBC QN AUTO: 36.1 PG (ref 27–31)
MCH RBC QN AUTO: 36.2 PG (ref 27–31)
MCH RBC QN AUTO: 36.5 PG (ref 27–31)
MCH RBC QN AUTO: 36.6 PG (ref 27–31)
MCH RBC QN AUTO: 36.6 PG (ref 27–31)
MCH RBC QN AUTO: 36.9 PG (ref 27–31)
MCH RBC QN AUTO: 37.1 PG (ref 27–31)
MCH RBC QN AUTO: 37.4 PG (ref 27–31)
MCH RBC QN AUTO: 37.6 PG (ref 27–31)
MCHC RBC AUTO-ENTMCNC: 28.6 G/DL (ref 32–36)
MCHC RBC AUTO-ENTMCNC: 28.7 G/DL (ref 32–36)
MCHC RBC AUTO-ENTMCNC: 29.5 G/DL (ref 32–36)
MCHC RBC AUTO-ENTMCNC: 30.4 G/DL (ref 32–36)
MCHC RBC AUTO-ENTMCNC: 30.6 G/DL (ref 32–36)
MCHC RBC AUTO-ENTMCNC: 30.7 G/DL (ref 32–36)
MCHC RBC AUTO-ENTMCNC: 31 G/DL (ref 32–36)
MCHC RBC AUTO-ENTMCNC: 31.3 G/DL (ref 32–36)
MCHC RBC AUTO-ENTMCNC: 31.3 G/DL (ref 32–36)
MCHC RBC AUTO-ENTMCNC: 31.6 G/DL (ref 32–36)
MCHC RBC AUTO-ENTMCNC: 31.8 G/DL (ref 32–36)
MCHC RBC AUTO-ENTMCNC: 31.9 G/DL (ref 32–36)
MCHC RBC AUTO-ENTMCNC: 32 G/DL (ref 32–36)
MCHC RBC AUTO-ENTMCNC: 32.2 G/DL (ref 32–36)
MCHC RBC AUTO-ENTMCNC: 32.2 G/DL (ref 32–36)
MCHC RBC AUTO-ENTMCNC: 32.3 G/DL (ref 32–36)
MCHC RBC AUTO-ENTMCNC: 32.5 G/DL (ref 32–36)
MCHC RBC AUTO-ENTMCNC: 32.5 G/DL (ref 32–36)
MCHC RBC AUTO-ENTMCNC: 32.6 G/DL (ref 32–36)
MCHC RBC AUTO-ENTMCNC: 32.9 G/DL (ref 32–36)
MCHC RBC AUTO-ENTMCNC: 33 G/DL (ref 32–36)
MCHC RBC AUTO-ENTMCNC: 33.1 G/DL (ref 32–36)
MCHC RBC AUTO-ENTMCNC: 33.3 G/DL (ref 32–36)
MCV RBC AUTO: 105 FL (ref 82–98)
MCV RBC AUTO: 106 FL (ref 82–98)
MCV RBC AUTO: 107 FL (ref 82–98)
MCV RBC AUTO: 108 FL (ref 82–98)
MCV RBC AUTO: 109 FL (ref 82–98)
MCV RBC AUTO: 111 FL (ref 82–98)
MCV RBC AUTO: 112 FL (ref 82–98)
MCV RBC AUTO: 113 FL (ref 82–98)
MCV RBC AUTO: 114 FL (ref 82–98)
MCV RBC AUTO: 115 FL (ref 82–98)
MCV RBC AUTO: 115 FL (ref 82–98)
MESOTHL CELL NFR FLD MANUAL: 1 %
MESOTHL CELL NFR FLD MANUAL: 1 %
MESOTHL CELL NFR FLD MANUAL: 2 %
METAMYELOCYTES NFR BLD MANUAL: 0.5 %
METAMYELOCYTES NFR BLD MANUAL: 1 %
METAMYELOCYTES NFR BLD MANUAL: 1.5 %
METAMYELOCYTES NFR BLD MANUAL: 2 %
METAMYELOCYTES NFR BLD MANUAL: 3 %
METAMYELOCYTES NFR BLD MANUAL: 4 %
METAMYELOCYTES NFR BLD MANUAL: 4 %
METAMYELOCYTES NFR BLD MANUAL: 6 %
METAMYELOCYTES NFR BLD MANUAL: 6 %
METAMYELOCYTES NFR BLD MANUAL: 7 %
METAMYELOCYTES NFR BLD MANUAL: 9 %
METHADONE UR QL SCN>300 NG/ML: NEGATIVE
MICROSCOPIC COMMENT: ABNORMAL
MICROSCOPIC COMMENT: ABNORMAL
MIN VOL: 11.2
MIN VOL: 13.4
MIN VOL: 13.8
MITOGEN IGNF BCKGRD COR BLD-ACNC: 0.04 IU/ML
MODE: ABNORMAL
MONOCYTES # BLD AUTO: 2.5 K/UL (ref 0.3–1)
MONOCYTES # BLD AUTO: 2.5 K/UL (ref 0.3–1)
MONOCYTES # BLD AUTO: 2.6 K/UL (ref 0.3–1)
MONOCYTES # BLD AUTO: ABNORMAL K/UL (ref 0.3–1)
MONOCYTES NFR BLD: 1 % (ref 4–15)
MONOCYTES NFR BLD: 11 % (ref 4–15)
MONOCYTES NFR BLD: 11 % (ref 4–15)
MONOCYTES NFR BLD: 11.2 % (ref 4–15)
MONOCYTES NFR BLD: 2.5 % (ref 4–15)
MONOCYTES NFR BLD: 3 % (ref 4–15)
MONOCYTES NFR BLD: 3 % (ref 4–15)
MONOCYTES NFR BLD: 4 % (ref 4–15)
MONOCYTES NFR BLD: 5 % (ref 4–15)
MONOCYTES NFR BLD: 6 % (ref 4–15)
MONOCYTES NFR BLD: 7 % (ref 4–15)
MONOCYTES NFR BLD: 8 % (ref 4–15)
MONOCYTES NFR BLD: 8 % (ref 4–15)
MONOCYTES NFR BLD: 9.2 % (ref 4–15)
MONOCYTES NFR BLD: 9.8 % (ref 4–15)
MONOS+MACROS NFR FLD MANUAL: 34 %
MONOS+MACROS NFR FLD MANUAL: 37 %
MONOS+MACROS NFR FLD MANUAL: 46 %
MV PEAK A VEL: 0.65 M/S
MV PEAK E VEL: 1 M/S
MYELOCYTES NFR BLD MANUAL: 0.5 %
MYELOCYTES NFR BLD MANUAL: 0.5 %
MYELOCYTES NFR BLD MANUAL: 1 %
MYELOCYTES NFR BLD MANUAL: 10 %
MYELOCYTES NFR BLD MANUAL: 2 %
MYELOCYTES NFR BLD MANUAL: 2 %
MYELOCYTES NFR BLD MANUAL: 2.5 %
MYELOCYTES NFR BLD MANUAL: 3 %
MYELOCYTES NFR BLD MANUAL: 5 %
MYELOCYTES NFR BLD MANUAL: 6 %
MYELOCYTES NFR BLD MANUAL: 6 %
MYELOCYTES NFR BLD MANUAL: 9 %
NEUTROPHILS # BLD AUTO: 14.9 K/UL (ref 1.8–7.7)
NEUTROPHILS # BLD AUTO: 19.6 K/UL (ref 1.8–7.7)
NEUTROPHILS # BLD AUTO: 20.4 K/UL (ref 1.8–7.7)
NEUTROPHILS NFR BLD: 35 % (ref 38–73)
NEUTROPHILS NFR BLD: 36 % (ref 38–73)
NEUTROPHILS NFR BLD: 47 % (ref 38–73)
NEUTROPHILS NFR BLD: 52 % (ref 38–73)
NEUTROPHILS NFR BLD: 56 % (ref 38–73)
NEUTROPHILS NFR BLD: 59 % (ref 38–73)
NEUTROPHILS NFR BLD: 63 % (ref 38–73)
NEUTROPHILS NFR BLD: 64 % (ref 38–73)
NEUTROPHILS NFR BLD: 67.7 % (ref 38–73)
NEUTROPHILS NFR BLD: 68 % (ref 38–73)
NEUTROPHILS NFR BLD: 73 % (ref 38–73)
NEUTROPHILS NFR BLD: 73.5 % (ref 38–73)
NEUTROPHILS NFR BLD: 74.1 % (ref 38–73)
NEUTROPHILS NFR BLD: 76 % (ref 38–73)
NEUTROPHILS NFR BLD: 76.5 % (ref 38–73)
NEUTROPHILS NFR BLD: 77 % (ref 38–73)
NEUTROPHILS NFR BLD: 77 % (ref 38–73)
NEUTROPHILS NFR BLD: 78 % (ref 38–73)
NEUTROPHILS NFR BLD: 79.5 % (ref 38–73)
NEUTROPHILS NFR BLD: 80 % (ref 38–73)
NEUTROPHILS NFR BLD: 82 % (ref 38–73)
NEUTROPHILS NFR BLD: 86 % (ref 38–73)
NEUTROPHILS NFR BLD: 88 % (ref 38–73)
NEUTROPHILS NFR FLD MANUAL: 27 %
NEUTROPHILS NFR FLD MANUAL: 33 %
NEUTROPHILS NFR FLD MANUAL: 49 %
NEUTS BAND NFR BLD MANUAL: 1 %
NEUTS BAND NFR BLD MANUAL: 10 %
NEUTS BAND NFR BLD MANUAL: 12 %
NEUTS BAND NFR BLD MANUAL: 2 %
NEUTS BAND NFR BLD MANUAL: 3 %
NEUTS BAND NFR BLD MANUAL: 3.5 %
NEUTS BAND NFR BLD MANUAL: 4 %
NEUTS BAND NFR BLD MANUAL: 4.5 %
NEUTS BAND NFR BLD MANUAL: 5 %
NEUTS BAND NFR BLD MANUAL: 6 %
NEUTS BAND NFR BLD MANUAL: 7 %
NEUTS BAND NFR BLD MANUAL: 8 %
NITRITE UR QL STRIP: NEGATIVE
NITRITE UR QL STRIP: NEGATIVE
NON-SQ EPI CELLS #/AREA URNS AUTO: 2 /HPF
NRBC BLD-RTO: 0 /100 WBC
NRBC BLD-RTO: 1 /100 WBC
NRBC BLD-RTO: 11 /100 WBC
NRBC BLD-RTO: 12 /100 WBC
NRBC BLD-RTO: 2 /100 WBC
NRBC BLD-RTO: 25 /100 WBC
NRBC BLD-RTO: 35 /100 WBC
NRBC BLD-RTO: 39 /100 WBC
NRBC BLD-RTO: 4 /100 WBC
NRBC BLD-RTO: 44 /100 WBC
NRBC BLD-RTO: 78 /100 WBC
NRBC BLD-RTO: 79 /100 WBC
NRBC BLD-RTO: 87 /100 WBC
NRBC BLD-RTO: 90 /100 WBC
NUM UNITS TRANS FFP: NORMAL
OPIATES UR QL SCN: NORMAL
OVALOCYTES BLD QL SMEAR: ABNORMAL
PAPPENHEIMER BOD BLD QL SMEAR: PRESENT
PCO2 BLDA: 21.5 MMHG (ref 35–45)
PCO2 BLDA: 21.6 MMHG (ref 35–45)
PCO2 BLDA: 27.2 MMHG (ref 35–45)
PCO2 BLDA: 27.5 MMHG (ref 35–45)
PCO2 BLDA: 43.5 MMHG (ref 35–45)
PCO2 BLDA: 44.8 MMHG (ref 35–45)
PCO2 BLDA: 45 MMHG (ref 35–45)
PCO2 BLDA: 45.7 MMHG (ref 35–45)
PCO2 BLDA: 48.4 MMHG (ref 35–45)
PCO2 BLDA: 56.6 MMHG (ref 35–45)
PCO2 BLDA: 63 MMHG (ref 35–45)
PCP UR QL SCN>25 NG/ML: NEGATIVE
PEEP: 12
PEEP: 15
PH SMN: 6.97 [PH] (ref 7.35–7.45)
PH SMN: 6.98 [PH] (ref 7.35–7.45)
PH SMN: 6.98 [PH] (ref 7.35–7.45)
PH SMN: 7.03 [PH] (ref 7.35–7.45)
PH SMN: 7.06 [PH] (ref 7.35–7.45)
PH SMN: 7.08 [PH] (ref 7.35–7.45)
PH SMN: 7.13 [PH] (ref 7.35–7.45)
PH SMN: 7.37 [PH] (ref 7.35–7.45)
PH SMN: 7.38 [PH] (ref 7.35–7.45)
PH SMN: 7.42 [PH] (ref 7.35–7.45)
PH SMN: 7.46 [PH] (ref 7.35–7.45)
PH UR STRIP: 5 [PH] (ref 5–8)
PH UR STRIP: 5 [PH] (ref 5–8)
PHOSPHATE SERPL-MCNC: 3.8 MG/DL (ref 2.7–4.5)
PHOSPHATE SERPL-MCNC: 3.8 MG/DL (ref 2.7–4.5)
PHOSPHATE SERPL-MCNC: 4.2 MG/DL (ref 2.7–4.5)
PHOSPHATE SERPL-MCNC: 4.3 MG/DL (ref 2.7–4.5)
PHOSPHATE SERPL-MCNC: 4.6 MG/DL (ref 2.7–4.5)
PHOSPHATE SERPL-MCNC: 4.7 MG/DL (ref 2.7–4.5)
PHOSPHATE SERPL-MCNC: 4.9 MG/DL (ref 2.7–4.5)
PHOSPHATE SERPL-MCNC: 5.2 MG/DL (ref 2.7–4.5)
PHOSPHATE SERPL-MCNC: 5.5 MG/DL (ref 2.7–4.5)
PHOSPHATE SERPL-MCNC: 5.5 MG/DL (ref 2.7–4.5)
PHOSPHATE SERPL-MCNC: 5.6 MG/DL (ref 2.7–4.5)
PHOSPHATE SERPL-MCNC: 6.2 MG/DL (ref 2.7–4.5)
PHOSPHATE SERPL-MCNC: 6.3 MG/DL (ref 2.7–4.5)
PHOSPHATE SERPL-MCNC: 6.5 MG/DL (ref 2.7–4.5)
PHOSPHATE SERPL-MCNC: 6.8 MG/DL (ref 2.7–4.5)
PHOSPHATE SERPL-MCNC: 7 MG/DL (ref 2.7–4.5)
PHOSPHATE SERPL-MCNC: 7 MG/DL (ref 2.7–4.5)
PHOSPHATE SERPL-MCNC: 7.1 MG/DL (ref 2.7–4.5)
PHOSPHATE SERPL-MCNC: 7.2 MG/DL (ref 2.7–4.5)
PHOSPHATE SERPL-MCNC: 7.8 MG/DL (ref 2.7–4.5)
PHOSPHATIDYLETHANOL (PETH): 45 NG/ML
PIP: 28
PIP: 32
PIP: 36
PISA TR MAX VEL: 2.58 M/S
PLATELET # BLD AUTO: 103 K/UL (ref 150–350)
PLATELET # BLD AUTO: 104 K/UL (ref 150–350)
PLATELET # BLD AUTO: 109 K/UL (ref 150–350)
PLATELET # BLD AUTO: 111 K/UL (ref 150–350)
PLATELET # BLD AUTO: 112 K/UL (ref 150–350)
PLATELET # BLD AUTO: 126 K/UL (ref 150–350)
PLATELET # BLD AUTO: 155 K/UL (ref 150–350)
PLATELET # BLD AUTO: 198 K/UL (ref 150–350)
PLATELET # BLD AUTO: 31 K/UL (ref 150–350)
PLATELET # BLD AUTO: 38 K/UL (ref 150–350)
PLATELET # BLD AUTO: 47 K/UL (ref 150–350)
PLATELET # BLD AUTO: 53 K/UL (ref 150–350)
PLATELET # BLD AUTO: 63 K/UL (ref 150–350)
PLATELET # BLD AUTO: 68 K/UL (ref 150–350)
PLATELET # BLD AUTO: 68 K/UL (ref 150–350)
PLATELET # BLD AUTO: 69 K/UL (ref 150–350)
PLATELET # BLD AUTO: 71 K/UL (ref 150–350)
PLATELET # BLD AUTO: 74 K/UL (ref 150–350)
PLATELET # BLD AUTO: 79 K/UL (ref 150–350)
PLATELET # BLD AUTO: 83 K/UL (ref 150–350)
PLATELET # BLD AUTO: 90 K/UL (ref 150–350)
PLATELET # BLD AUTO: 91 K/UL (ref 150–350)
PLATELET # BLD AUTO: 98 K/UL (ref 150–350)
PLATELET BLD QL SMEAR: ABNORMAL
PMV BLD AUTO: 11.3 FL (ref 9.2–12.9)
PMV BLD AUTO: 11.4 FL (ref 9.2–12.9)
PMV BLD AUTO: 11.5 FL (ref 9.2–12.9)
PMV BLD AUTO: 11.7 FL (ref 9.2–12.9)
PMV BLD AUTO: 11.8 FL (ref 9.2–12.9)
PMV BLD AUTO: 11.8 FL (ref 9.2–12.9)
PMV BLD AUTO: 11.9 FL (ref 9.2–12.9)
PMV BLD AUTO: 11.9 FL (ref 9.2–12.9)
PMV BLD AUTO: 12 FL (ref 9.2–12.9)
PMV BLD AUTO: 12 FL (ref 9.2–12.9)
PMV BLD AUTO: 12.1 FL (ref 9.2–12.9)
PMV BLD AUTO: 12.2 FL (ref 9.2–12.9)
PMV BLD AUTO: 12.3 FL (ref 9.2–12.9)
PMV BLD AUTO: 12.3 FL (ref 9.2–12.9)
PMV BLD AUTO: 12.4 FL (ref 9.2–12.9)
PMV BLD AUTO: 12.5 FL (ref 9.2–12.9)
PMV BLD AUTO: 12.6 FL (ref 9.2–12.9)
PMV BLD AUTO: 12.7 FL (ref 9.2–12.9)
PO2 BLDA: 108 MMHG (ref 80–100)
PO2 BLDA: 55 MMHG (ref 80–100)
PO2 BLDA: 56 MMHG (ref 80–100)
PO2 BLDA: 62 MMHG (ref 80–100)
PO2 BLDA: 66 MMHG (ref 80–100)
PO2 BLDA: 71 MMHG (ref 80–100)
PO2 BLDA: 72 MMHG (ref 80–100)
PO2 BLDA: 73 MMHG (ref 80–100)
PO2 BLDA: 81 MMHG (ref 80–100)
PO2 BLDA: 90 MMHG (ref 80–100)
PO2 BLDA: 92 MMHG (ref 80–100)
POC BE: -12 MMOL/L
POC BE: -13 MMOL/L
POC BE: -14 MMOL/L
POC BE: -16 MMOL/L
POC BE: -19 MMOL/L
POC BE: -20 MMOL/L
POC BE: -21 MMOL/L
POC BE: -21 MMOL/L
POC BE: -4 MMOL/L
POC BE: -7 MMOL/L
POC BE: -8 MMOL/L
POC IONIZED CALCIUM: 1.05 MMOL/L (ref 1.06–1.42)
POC IONIZED CALCIUM: 1.1 MMOL/L (ref 1.06–1.42)
POC IONIZED CALCIUM: 1.19 MMOL/L (ref 1.06–1.42)
POC PCO2 TEMP: 21.5 MMHG
POC PH TEMP: 7.38
POC PO2 TEMP: 65 MMHG
POC SATURATED O2: 85 % (ref 95–100)
POC SATURATED O2: 87 % (ref 95–100)
POC SATURATED O2: 90 % (ref 95–100)
POC SATURATED O2: 90 % (ref 95–100)
POC SATURATED O2: 91 % (ref 95–100)
POC SATURATED O2: 92 % (ref 95–100)
POC SATURATED O2: 92 % (ref 95–100)
POC SATURATED O2: 93 % (ref 95–100)
POC SATURATED O2: 94 % (ref 95–100)
POC TCO2: 12 MMOL/L (ref 23–27)
POC TCO2: 12 MMOL/L (ref 23–27)
POC TCO2: 13 MMOL/L (ref 23–27)
POC TCO2: 15 MMOL/L (ref 23–27)
POC TCO2: 18 MMOL/L (ref 23–27)
POC TCO2: 18 MMOL/L (ref 23–27)
POC TCO2: 20 MMOL/L (ref 23–27)
POC TCO2: 23 MMOL/L (ref 23–27)
POC TEMPERATURE: ABNORMAL
POCT GLUCOSE: 103 MG/DL (ref 70–110)
POCT GLUCOSE: 123 MG/DL (ref 70–110)
POCT GLUCOSE: 125 MG/DL (ref 70–110)
POCT GLUCOSE: 126 MG/DL (ref 70–110)
POCT GLUCOSE: 139 MG/DL (ref 70–110)
POCT GLUCOSE: 150 MG/DL (ref 70–110)
POCT GLUCOSE: 223 MG/DL (ref 70–110)
POCT GLUCOSE: 59 MG/DL (ref 70–110)
POCT GLUCOSE: <20 MG/DL (ref 70–110)
POIKILOCYTOSIS BLD QL SMEAR: ABNORMAL
POIKILOCYTOSIS BLD QL SMEAR: SLIGHT
POLYCHROMASIA BLD QL SMEAR: ABNORMAL
POTASSIUM BLD-SCNC: 4.4 MMOL/L (ref 3.5–5.1)
POTASSIUM BLD-SCNC: 4.9 MMOL/L (ref 3.5–5.1)
POTASSIUM BLD-SCNC: 4.9 MMOL/L (ref 3.5–5.1)
POTASSIUM SERPL-SCNC: 4.1 MMOL/L (ref 3.5–5.1)
POTASSIUM SERPL-SCNC: 4.1 MMOL/L (ref 3.5–5.1)
POTASSIUM SERPL-SCNC: 4.2 MMOL/L (ref 3.5–5.1)
POTASSIUM SERPL-SCNC: 4.3 MMOL/L (ref 3.5–5.1)
POTASSIUM SERPL-SCNC: 4.4 MMOL/L (ref 3.5–5.1)
POTASSIUM SERPL-SCNC: 4.4 MMOL/L (ref 3.5–5.1)
POTASSIUM SERPL-SCNC: 4.5 MMOL/L (ref 3.5–5.1)
POTASSIUM SERPL-SCNC: 4.5 MMOL/L (ref 3.5–5.1)
POTASSIUM SERPL-SCNC: 4.7 MMOL/L (ref 3.5–5.1)
POTASSIUM SERPL-SCNC: 4.7 MMOL/L (ref 3.5–5.1)
POTASSIUM SERPL-SCNC: 5 MMOL/L (ref 3.5–5.1)
POTASSIUM SERPL-SCNC: 5 MMOL/L (ref 3.5–5.1)
POTASSIUM SERPL-SCNC: 5.1 MMOL/L (ref 3.5–5.1)
POTASSIUM SERPL-SCNC: 5.1 MMOL/L (ref 3.5–5.1)
POTASSIUM SERPL-SCNC: 5.3 MMOL/L (ref 3.5–5.1)
POTASSIUM SERPL-SCNC: 5.4 MMOL/L (ref 3.5–5.1)
POTASSIUM SERPL-SCNC: 5.5 MMOL/L (ref 3.5–5.1)
POTASSIUM SERPL-SCNC: 5.5 MMOL/L (ref 3.5–5.1)
POTASSIUM SERPL-SCNC: 6.1 MMOL/L (ref 3.5–5.1)
POTASSIUM SERPL-SCNC: 6.2 MMOL/L (ref 3.5–5.1)
PREALB SERPL-MCNC: 4 MG/DL (ref 20–43)
PROMYELOCYTES NFR BLD MANUAL: 0.5 %
PROMYELOCYTES NFR BLD MANUAL: 0.5 %
PROMYELOCYTES NFR BLD MANUAL: 1 %
PROMYELOCYTES NFR BLD MANUAL: 1 %
PROMYELOCYTES NFR BLD MANUAL: 2 %
PROMYELOCYTES NFR BLD MANUAL: 4 %
PROMYELOCYTES NFR BLD MANUAL: 6 %
PROT FLD-MCNC: 2.1 G/DL
PROT FLD-MCNC: 2.1 G/DL
PROT SERPL-MCNC: 2.7 G/DL (ref 6–8.4)
PROT SERPL-MCNC: 4.5 G/DL (ref 6–8.4)
PROT SERPL-MCNC: 4.6 G/DL (ref 6–8.4)
PROT SERPL-MCNC: 4.6 G/DL (ref 6–8.4)
PROT SERPL-MCNC: 5.2 G/DL (ref 6–8.4)
PROT SERPL-MCNC: 5.3 G/DL (ref 6–8.4)
PROT SERPL-MCNC: 5.3 G/DL (ref 6–8.4)
PROT SERPL-MCNC: 5.4 G/DL (ref 6–8.4)
PROT SERPL-MCNC: 5.4 G/DL (ref 6–8.4)
PROT SERPL-MCNC: 5.5 G/DL (ref 6–8.4)
PROT SERPL-MCNC: 5.7 G/DL (ref 6–8.4)
PROT SERPL-MCNC: 5.8 G/DL (ref 6–8.4)
PROT SERPL-MCNC: 5.9 G/DL (ref 6–8.4)
PROT SERPL-MCNC: 6 G/DL (ref 6–8.4)
PROT UR QL STRIP: ABNORMAL
PROT UR QL STRIP: ABNORMAL
PROT UR-MCNC: 44 MG/DL (ref 0–15)
PROT/CREAT UR: 0.23 MG/G{CREAT} (ref 0–0.2)
PROTHROMBIN TIME: 12.9 SEC (ref 9–12.5)
PROTHROMBIN TIME: 13.5 SEC (ref 9–12.5)
PROTHROMBIN TIME: 13.5 SEC (ref 9–12.5)
PROTHROMBIN TIME: 13.8 SEC (ref 9–12.5)
PROTHROMBIN TIME: 13.9 SEC (ref 9–12.5)
PROTHROMBIN TIME: 13.9 SEC (ref 9–12.5)
PROTHROMBIN TIME: 14.4 SEC (ref 9–12.5)
PROTHROMBIN TIME: 14.8 SEC (ref 9–12.5)
PROTHROMBIN TIME: 15.1 SEC (ref 9–12.5)
PROTHROMBIN TIME: 16.5 SEC (ref 9–12.5)
PROTHROMBIN TIME: 16.9 SEC (ref 9–12.5)
PROTHROMBIN TIME: 19.2 SEC (ref 9–12.5)
PROTHROMBIN TIME: 21.4 SEC (ref 9–12.5)
PROTHROMBIN TIME: 21.8 SEC (ref 9–12.5)
PROTHROMBIN TIME: 23.3 SEC (ref 9–12.5)
PROTHROMBIN TIME: 27.1 SEC (ref 9–12.5)
PULM VEIN S/D RATIO: 1.16
PV PEAK D VEL: 0.49 M/S
PV PEAK S VEL: 0.57 M/S
RA MAJOR: 4.62 CM
RA PRESSURE: 3 MMHG
RA WIDTH: 3.15 CM
RBC # BLD AUTO: 1.59 M/UL (ref 4.6–6.2)
RBC # BLD AUTO: 1.62 M/UL (ref 4.6–6.2)
RBC # BLD AUTO: 1.73 M/UL (ref 4.6–6.2)
RBC # BLD AUTO: 1.88 M/UL (ref 4.6–6.2)
RBC # BLD AUTO: 1.9 M/UL (ref 4.6–6.2)
RBC # BLD AUTO: 1.92 M/UL (ref 4.6–6.2)
RBC # BLD AUTO: 1.93 M/UL (ref 4.6–6.2)
RBC # BLD AUTO: 2 M/UL (ref 4.6–6.2)
RBC # BLD AUTO: 2.08 M/UL (ref 4.6–6.2)
RBC # BLD AUTO: 2.15 M/UL (ref 4.6–6.2)
RBC # BLD AUTO: 2.16 M/UL (ref 4.6–6.2)
RBC # BLD AUTO: 2.19 M/UL (ref 4.6–6.2)
RBC # BLD AUTO: 2.19 M/UL (ref 4.6–6.2)
RBC # BLD AUTO: 2.2 M/UL (ref 4.6–6.2)
RBC # BLD AUTO: 2.25 M/UL (ref 4.6–6.2)
RBC # BLD AUTO: 2.29 M/UL (ref 4.6–6.2)
RBC # BLD AUTO: 2.32 M/UL (ref 4.6–6.2)
RBC # BLD AUTO: 2.35 M/UL (ref 4.6–6.2)
RBC # BLD AUTO: 2.36 M/UL (ref 4.6–6.2)
RBC # BLD AUTO: 2.38 M/UL (ref 4.6–6.2)
RBC # BLD AUTO: 2.38 M/UL (ref 4.6–6.2)
RBC # BLD AUTO: 2.41 M/UL (ref 4.6–6.2)
RBC # BLD AUTO: 2.48 M/UL (ref 4.6–6.2)
RBC # BLD AUTO: 2.7 M/UL (ref 4.6–6.2)
RBC # BLD AUTO: 3.07 M/UL (ref 4.6–6.2)
RBC #/AREA URNS AUTO: 31 /HPF (ref 0–4)
RBC #/AREA URNS AUTO: >100 /HPF (ref 0–4)
RIGHT VENTRICULAR END-DIASTOLIC DIMENSION: 4.01 CM
RPR SER QL: NORMAL
RV TISSUE DOPPLER FREE WALL SYSTOLIC VELOCITY 1 (APICAL 4 CHAMBER VIEW): 17.6 CM/S
SAMPLE: ABNORMAL
SATURATED IRON: 43 % (ref 20–50)
SCHISTOCYTES BLD QL SMEAR: ABNORMAL
SCHISTOCYTES BLD QL SMEAR: PRESENT
SINUS: 3.54 CM
SITE: ABNORMAL
SMUDGE CELLS BLD QL SMEAR: PRESENT
SODIUM BLD-SCNC: 135 MMOL/L (ref 136–145)
SODIUM BLD-SCNC: 137 MMOL/L (ref 136–145)
SODIUM BLD-SCNC: 139 MMOL/L (ref 136–145)
SODIUM SERPL-SCNC: 127 MMOL/L (ref 136–145)
SODIUM SERPL-SCNC: 128 MMOL/L (ref 136–145)
SODIUM SERPL-SCNC: 129 MMOL/L (ref 136–145)
SODIUM SERPL-SCNC: 130 MMOL/L (ref 136–145)
SODIUM SERPL-SCNC: 131 MMOL/L (ref 136–145)
SODIUM SERPL-SCNC: 132 MMOL/L (ref 136–145)
SODIUM SERPL-SCNC: 134 MMOL/L (ref 136–145)
SODIUM SERPL-SCNC: 135 MMOL/L (ref 136–145)
SODIUM SERPL-SCNC: 135 MMOL/L (ref 136–145)
SODIUM SERPL-SCNC: 136 MMOL/L (ref 136–145)
SODIUM SERPL-SCNC: 136 MMOL/L (ref 136–145)
SODIUM SERPL-SCNC: 137 MMOL/L (ref 136–145)
SODIUM SERPL-SCNC: 137 MMOL/L (ref 136–145)
SODIUM SERPL-SCNC: 141 MMOL/L (ref 136–145)
SODIUM UR-SCNC: <20 MMOL/L (ref 20–250)
SODIUM UR-SCNC: <20 MMOL/L (ref 20–250)
SP GR UR STRIP: 1.02 (ref 1–1.03)
SP GR UR STRIP: 1.02 (ref 1–1.03)
SP02: 94
SP02: 95
SP02: 98
SPECIMEN SOURCE: NORMAL
SQUAMOUS #/AREA URNS AUTO: 0 /HPF
SQUAMOUS #/AREA URNS AUTO: 11 /HPF
STJ: 3.36 CM
STRONGYLOIDES ANTIBODY IGG: NEGATIVE
T-SPOT TB SCREENING TEST: NORMAL
TARGETS BLD QL SMEAR: ABNORMAL
TB GOLD PLUS: ABNORMAL
TB2 - NIL: 0 IU/ML
TDI LATERAL: 0.11 M/S
TDI SEPTAL: 0.1 M/S
TDI: 0.11 M/S
TOTAL IRON BINDING CAPACITY: 83 UG/DL (ref 250–450)
TOXIC GRANULES BLD QL SMEAR: PRESENT
TOXICOLOGY INFORMATION: NORMAL
TR MAX PG: 27 MMHG
TRANS ERYTHROCYTES VOL PATIENT: NORMAL ML
TRANSFERRIN SERPL-MCNC: 56 MG/DL (ref 200–375)
TRICUSPID ANNULAR PLANE SYSTOLIC EXCURSION: 2.76 CM
TV REST PULMONARY ARTERY PRESSURE: 30 MMHG
UNIT NUMBER: NORMAL
URN SPEC COLLECT METH UR: ABNORMAL
URN SPEC COLLECT METH UR: ABNORMAL
UUN UR-MCNC: 306 MG/DL (ref 140–1050)
VANCOMYCIN SERPL-MCNC: 28.9 UG/ML
VARICELLA INTERPRETATION: POSITIVE
VARICELLA ZOSTER IGG: 2.44 ISR (ref 0–0.9)
VIT B12 SERPL-MCNC: >2000 PG/ML (ref 210–950)
VT: 420
VT: 420
VT: 500
WBC # BLD AUTO: 21.84 K/UL (ref 3.9–12.7)
WBC # BLD AUTO: 22 K/UL (ref 3.9–12.7)
WBC # BLD AUTO: 23.73 K/UL (ref 3.9–12.7)
WBC # BLD AUTO: 24.87 K/UL (ref 3.9–12.7)
WBC # BLD AUTO: 25.78 K/UL (ref 3.9–12.7)
WBC # BLD AUTO: 25.97 K/UL (ref 3.9–12.7)
WBC # BLD AUTO: 26.73 K/UL (ref 3.9–12.7)
WBC # BLD AUTO: 27.47 K/UL (ref 3.9–12.7)
WBC # BLD AUTO: 27.69 K/UL (ref 3.9–12.7)
WBC # BLD AUTO: 27.99 K/UL (ref 3.9–12.7)
WBC # BLD AUTO: 30.32 K/UL (ref 3.9–12.7)
WBC # BLD AUTO: 31.37 K/UL (ref 3.9–12.7)
WBC # BLD AUTO: 34.15 K/UL (ref 3.9–12.7)
WBC # BLD AUTO: 35.36 K/UL (ref 3.9–12.7)
WBC # BLD AUTO: 36.31 K/UL (ref 3.9–12.7)
WBC # BLD AUTO: 40.04 K/UL (ref 3.9–12.7)
WBC # BLD AUTO: 40.28 K/UL (ref 3.9–12.7)
WBC # BLD AUTO: 40.33 K/UL (ref 3.9–12.7)
WBC # BLD AUTO: 40.58 K/UL (ref 3.9–12.7)
WBC # BLD AUTO: 40.97 K/UL (ref 3.9–12.7)
WBC # BLD AUTO: 41.03 K/UL (ref 3.9–12.7)
WBC # BLD AUTO: 41.69 K/UL (ref 3.9–12.7)
WBC # BLD AUTO: 48 K/UL (ref 3.9–12.7)
WBC # BLD AUTO: 54.18 K/UL (ref 3.9–12.7)
WBC # BLD AUTO: 55.62 K/UL (ref 3.9–12.7)
WBC # FLD: 352 /CU MM
WBC # FLD: 468 /CU MM
WBC # FLD: 855 /CU MM
WBC #/AREA URNS AUTO: 34 /HPF (ref 0–5)
WBC #/AREA URNS AUTO: 34 /HPF (ref 0–5)
WBC TOXIC VACUOLES BLD QL SMEAR: PRESENT

## 2019-01-01 PROCEDURE — 25000003 PHARM REV CODE 250: Performed by: HOSPITALIST

## 2019-01-01 PROCEDURE — 20600001 HC STEP DOWN PRIVATE ROOM: Mod: NTX

## 2019-01-01 PROCEDURE — 25000003 PHARM REV CODE 250: Mod: NTX | Performed by: HOSPITALIST

## 2019-01-01 PROCEDURE — 63600175 PHARM REV CODE 636 W HCPCS: Mod: NTX | Performed by: HOSPITALIST

## 2019-01-01 PROCEDURE — 93010 ELECTROCARDIOGRAM REPORT: CPT | Mod: ,,, | Performed by: INTERNAL MEDICINE

## 2019-01-01 PROCEDURE — 86920 COMPATIBILITY TEST SPIN: CPT | Mod: NTX

## 2019-01-01 PROCEDURE — 20600001 HC STEP DOWN PRIVATE ROOM

## 2019-01-01 PROCEDURE — 25000003 PHARM REV CODE 250: Performed by: STUDENT IN AN ORGANIZED HEALTH CARE EDUCATION/TRAINING PROGRAM

## 2019-01-01 PROCEDURE — 63600175 PHARM REV CODE 636 W HCPCS: Mod: NTX | Performed by: STUDENT IN AN ORGANIZED HEALTH CARE EDUCATION/TRAINING PROGRAM

## 2019-01-01 PROCEDURE — 63600175 PHARM REV CODE 636 W HCPCS: Performed by: HOSPITALIST

## 2019-01-01 PROCEDURE — 82105 ALPHA-FETOPROTEIN SERUM: CPT

## 2019-01-01 PROCEDURE — 80053 COMPREHEN METABOLIC PANEL: CPT | Mod: NTX

## 2019-01-01 PROCEDURE — 27200966 HC CLOSED SUCTION SYSTEM: Mod: NTX

## 2019-01-01 PROCEDURE — P9047 ALBUMIN (HUMAN), 25%, 50ML: HCPCS | Mod: JG | Performed by: HOSPITALIST

## 2019-01-01 PROCEDURE — 80053 COMPREHEN METABOLIC PANEL: CPT

## 2019-01-01 PROCEDURE — P9017 PLASMA 1 DONOR FRZ W/IN 8 HR: HCPCS

## 2019-01-01 PROCEDURE — 63600175 PHARM REV CODE 636 W HCPCS: Performed by: STUDENT IN AN ORGANIZED HEALTH CARE EDUCATION/TRAINING PROGRAM

## 2019-01-01 PROCEDURE — 25000003 PHARM REV CODE 250: Mod: NTX | Performed by: STUDENT IN AN ORGANIZED HEALTH CARE EDUCATION/TRAINING PROGRAM

## 2019-01-01 PROCEDURE — 99233 PR SUBSEQUENT HOSPITAL CARE,LEVL III: ICD-10-PCS | Mod: ,,, | Performed by: INTERNAL MEDICINE

## 2019-01-01 PROCEDURE — 99233 SBSQ HOSP IP/OBS HIGH 50: CPT | Mod: ,,, | Performed by: INTERNAL MEDICINE

## 2019-01-01 PROCEDURE — 85610 PROTHROMBIN TIME: CPT

## 2019-01-01 PROCEDURE — 99223 PR INITIAL HOSPITAL CARE,LEVL III: ICD-10-PCS | Mod: NTX,,, | Performed by: INTERNAL MEDICINE

## 2019-01-01 PROCEDURE — 99233 SBSQ HOSP IP/OBS HIGH 50: CPT | Mod: ,,, | Performed by: HOSPITALIST

## 2019-01-01 PROCEDURE — 97161 PT EVAL LOW COMPLEX 20 MIN: CPT

## 2019-01-01 PROCEDURE — 93010 EKG 12-LEAD: ICD-10-PCS | Mod: ,,, | Performed by: INTERNAL MEDICINE

## 2019-01-01 PROCEDURE — P9021 RED BLOOD CELLS UNIT: HCPCS

## 2019-01-01 PROCEDURE — 85027 COMPLETE CBC AUTOMATED: CPT

## 2019-01-01 PROCEDURE — 85027 COMPLETE CBC AUTOMATED: CPT | Mod: 91

## 2019-01-01 PROCEDURE — 99900035 HC TECH TIME PER 15 MIN (STAT): Mod: NTX

## 2019-01-01 PROCEDURE — 63600175 PHARM REV CODE 636 W HCPCS: Mod: JG | Performed by: HOSPITALIST

## 2019-01-01 PROCEDURE — 82140 ASSAY OF AMMONIA: CPT | Mod: NTX

## 2019-01-01 PROCEDURE — 80069 RENAL FUNCTION PANEL: CPT

## 2019-01-01 PROCEDURE — 84100 ASSAY OF PHOSPHORUS: CPT

## 2019-01-01 PROCEDURE — 99233 SBSQ HOSP IP/OBS HIGH 50: CPT | Mod: NTX,,, | Performed by: INTERNAL MEDICINE

## 2019-01-01 PROCEDURE — 85610 PROTHROMBIN TIME: CPT | Mod: NTX

## 2019-01-01 PROCEDURE — 99232 PR SUBSEQUENT HOSPITAL CARE,LEVL II: ICD-10-PCS | Mod: ,,, | Performed by: PSYCHIATRY & NEUROLOGY

## 2019-01-01 PROCEDURE — 85007 BL SMEAR W/DIFF WBC COUNT: CPT

## 2019-01-01 PROCEDURE — 99233 PR SUBSEQUENT HOSPITAL CARE,LEVL III: ICD-10-PCS | Mod: ,,, | Performed by: HOSPITALIST

## 2019-01-01 PROCEDURE — 82042 OTHER SOURCE ALBUMIN QUAN EA: CPT

## 2019-01-01 PROCEDURE — 86787 VARICELLA-ZOSTER ANTIBODY: CPT

## 2019-01-01 PROCEDURE — 99223 1ST HOSP IP/OBS HIGH 75: CPT | Mod: 25,,, | Performed by: SURGERY

## 2019-01-01 PROCEDURE — 83605 ASSAY OF LACTIC ACID: CPT

## 2019-01-01 PROCEDURE — 93005 ELECTROCARDIOGRAM TRACING: CPT

## 2019-01-01 PROCEDURE — 85027 COMPLETE CBC AUTOMATED: CPT | Mod: 91,NTX

## 2019-01-01 PROCEDURE — 99232 SBSQ HOSP IP/OBS MODERATE 35: CPT | Mod: ,,, | Performed by: INTERNAL MEDICINE

## 2019-01-01 PROCEDURE — 86965 POOLING BLOOD PLATELETS: CPT

## 2019-01-01 PROCEDURE — 90945 DIALYSIS ONE EVALUATION: CPT | Mod: NTX

## 2019-01-01 PROCEDURE — 84100 ASSAY OF PHOSPHORUS: CPT | Mod: NTX

## 2019-01-01 PROCEDURE — 36415 COLL VENOUS BLD VENIPUNCTURE: CPT | Mod: NTX

## 2019-01-01 PROCEDURE — 83690 ASSAY OF LIPASE: CPT

## 2019-01-01 PROCEDURE — 82140 ASSAY OF AMMONIA: CPT

## 2019-01-01 PROCEDURE — 99232 PR SUBSEQUENT HOSPITAL CARE,LEVL II: ICD-10-PCS | Mod: ,,, | Performed by: INTERNAL MEDICINE

## 2019-01-01 PROCEDURE — 93005 ELECTROCARDIOGRAM TRACING: CPT | Mod: NTX

## 2019-01-01 PROCEDURE — 20000000 HC ICU ROOM: Mod: NTX

## 2019-01-01 PROCEDURE — 36620 INSERTION CATHETER ARTERY: CPT | Mod: ,,, | Performed by: SURGERY

## 2019-01-01 PROCEDURE — 87186 SC STD MICRODIL/AGAR DIL: CPT

## 2019-01-01 PROCEDURE — 86901 BLOOD TYPING SEROLOGIC RH(D): CPT

## 2019-01-01 PROCEDURE — 80053 COMPREHEN METABOLIC PANEL: CPT | Mod: 91

## 2019-01-01 PROCEDURE — 86592 SYPHILIS TEST NON-TREP QUAL: CPT

## 2019-01-01 PROCEDURE — 83735 ASSAY OF MAGNESIUM: CPT | Mod: NTX

## 2019-01-01 PROCEDURE — 94761 N-INVAS EAR/PLS OXIMETRY MLT: CPT | Mod: NTX

## 2019-01-01 PROCEDURE — 90945 DIALYSIS ONE EVALUATION: CPT

## 2019-01-01 PROCEDURE — 81001 URINALYSIS AUTO W/SCOPE: CPT

## 2019-01-01 PROCEDURE — 99233 PR SUBSEQUENT HOSPITAL CARE,LEVL III: ICD-10-PCS | Mod: NTX,,, | Performed by: INTERNAL MEDICINE

## 2019-01-01 PROCEDURE — 20000000 HC ICU ROOM

## 2019-01-01 PROCEDURE — 87205 SMEAR GRAM STAIN: CPT

## 2019-01-01 PROCEDURE — 87106 FUNGI IDENTIFICATION YEAST: CPT | Mod: NTX

## 2019-01-01 PROCEDURE — 85025 COMPLETE CBC W/AUTO DIFF WBC: CPT | Mod: NTX

## 2019-01-01 PROCEDURE — 82746 ASSAY OF FOLIC ACID SERUM: CPT

## 2019-01-01 PROCEDURE — 87070 CULTURE OTHR SPECIMN AEROBIC: CPT | Mod: 59,NTX

## 2019-01-01 PROCEDURE — 82550 ASSAY OF CK (CPK): CPT

## 2019-01-01 PROCEDURE — 86703 HIV-1/HIV-2 1 RESULT ANTBDY: CPT

## 2019-01-01 PROCEDURE — 83605 ASSAY OF LACTIC ACID: CPT | Mod: 91,NTX

## 2019-01-01 PROCEDURE — 86850 RBC ANTIBODY SCREEN: CPT

## 2019-01-01 PROCEDURE — 97802 MEDICAL NUTRITION INDIV IN: CPT

## 2019-01-01 PROCEDURE — 84134 ASSAY OF PREALBUMIN: CPT

## 2019-01-01 PROCEDURE — 83735 ASSAY OF MAGNESIUM: CPT

## 2019-01-01 PROCEDURE — 86704 HEP B CORE ANTIBODY TOTAL: CPT

## 2019-01-01 PROCEDURE — 80100008 HC CRRT DAILY MAINTENANCE

## 2019-01-01 PROCEDURE — 99233 PR SUBSEQUENT HOSPITAL CARE,LEVL III: ICD-10-PCS | Mod: NTX,,, | Performed by: HOSPITALIST

## 2019-01-01 PROCEDURE — 83735 ASSAY OF MAGNESIUM: CPT | Mod: 91,NTX

## 2019-01-01 PROCEDURE — 87086 URINE CULTURE/COLONY COUNT: CPT

## 2019-01-01 PROCEDURE — 87070 CULTURE OTHR SPECIMN AEROBIC: CPT | Mod: NTX

## 2019-01-01 PROCEDURE — 36415 COLL VENOUS BLD VENIPUNCTURE: CPT

## 2019-01-01 PROCEDURE — 99233 PR SUBSEQUENT HOSPITAL CARE,LEVL III: ICD-10-PCS | Mod: ,,, | Performed by: SURGERY

## 2019-01-01 PROCEDURE — 86644 CMV ANTIBODY: CPT

## 2019-01-01 PROCEDURE — 25000003 PHARM REV CODE 250

## 2019-01-01 PROCEDURE — 82330 ASSAY OF CALCIUM: CPT | Mod: NTX

## 2019-01-01 PROCEDURE — 87040 BLOOD CULTURE FOR BACTERIA: CPT

## 2019-01-01 PROCEDURE — 85730 THROMBOPLASTIN TIME PARTIAL: CPT | Mod: 91

## 2019-01-01 PROCEDURE — 83735 ASSAY OF MAGNESIUM: CPT | Mod: 91

## 2019-01-01 PROCEDURE — 82803 BLOOD GASES ANY COMBINATION: CPT | Mod: NTX

## 2019-01-01 PROCEDURE — 85610 PROTHROMBIN TIME: CPT | Mod: 91,NTX

## 2019-01-01 PROCEDURE — 76937 US GUIDE VASCULAR ACCESS: CPT

## 2019-01-01 PROCEDURE — 84157 ASSAY OF PROTEIN OTHER: CPT

## 2019-01-01 PROCEDURE — 86920 COMPATIBILITY TEST SPIN: CPT

## 2019-01-01 PROCEDURE — 99900026 HC AIRWAY MAINTENANCE (STAT): Mod: NTX

## 2019-01-01 PROCEDURE — P9021 RED BLOOD CELLS UNIT: HCPCS | Mod: NTX

## 2019-01-01 PROCEDURE — 83605 ASSAY OF LACTIC ACID: CPT | Mod: NTX

## 2019-01-01 PROCEDURE — 80321 ALCOHOLS BIOMARKERS 1OR 2: CPT

## 2019-01-01 PROCEDURE — 36620 PR INSERT CATH,ART,PERCUT,SHORTTERM: ICD-10-PCS | Mod: ,,, | Performed by: SURGERY

## 2019-01-01 PROCEDURE — 27000221 HC OXYGEN, UP TO 24 HOURS: Mod: NTX

## 2019-01-01 PROCEDURE — P9045 ALBUMIN (HUMAN), 5%, 250 ML: HCPCS | Mod: JG,NTX | Performed by: STUDENT IN AN ORGANIZED HEALTH CARE EDUCATION/TRAINING PROGRAM

## 2019-01-01 PROCEDURE — 86706 HEP B SURFACE ANTIBODY: CPT

## 2019-01-01 PROCEDURE — 80048 BASIC METABOLIC PNL TOTAL CA: CPT

## 2019-01-01 PROCEDURE — 84156 ASSAY OF PROTEIN URINE: CPT

## 2019-01-01 PROCEDURE — 85007 BL SMEAR W/DIFF WBC COUNT: CPT | Mod: 91

## 2019-01-01 PROCEDURE — 87340 HEPATITIS B SURFACE AG IA: CPT

## 2019-01-01 PROCEDURE — 83540 ASSAY OF IRON: CPT

## 2019-01-01 PROCEDURE — 84295 ASSAY OF SERUM SODIUM: CPT | Mod: NTX

## 2019-01-01 PROCEDURE — 90472 IMMUNIZATION ADMIN EACH ADD: CPT | Mod: NTX | Performed by: HOSPITALIST

## 2019-01-01 PROCEDURE — 87040 BLOOD CULTURE FOR BACTERIA: CPT | Mod: NTX

## 2019-01-01 PROCEDURE — P9047 ALBUMIN (HUMAN), 25%, 50ML: HCPCS | Mod: JG,NTX | Performed by: HOSPITALIST

## 2019-01-01 PROCEDURE — 87070 CULTURE OTHR SPECIMN AEROBIC: CPT

## 2019-01-01 PROCEDURE — 84540 ASSAY OF URINE/UREA-N: CPT

## 2019-01-01 PROCEDURE — P9012 CRYOPRECIPITATE EACH UNIT: HCPCS

## 2019-01-01 PROCEDURE — 80069 RENAL FUNCTION PANEL: CPT | Mod: NTX

## 2019-01-01 PROCEDURE — 83615 LACTATE (LD) (LDH) ENZYME: CPT

## 2019-01-01 PROCEDURE — 25000003 PHARM REV CODE 250: Mod: NTX

## 2019-01-01 PROCEDURE — 83010 ASSAY OF HAPTOGLOBIN QUANT: CPT

## 2019-01-01 PROCEDURE — 90670 PCV13 VACCINE IM: CPT | Mod: NTX | Performed by: HOSPITALIST

## 2019-01-01 PROCEDURE — 99223 1ST HOSP IP/OBS HIGH 75: CPT | Mod: ,,, | Performed by: INTERNAL MEDICINE

## 2019-01-01 PROCEDURE — 63600175 PHARM REV CODE 636 W HCPCS: Mod: JG,NTX

## 2019-01-01 PROCEDURE — P9045 ALBUMIN (HUMAN), 5%, 250 ML: HCPCS | Mod: JG,NTX

## 2019-01-01 PROCEDURE — 83690 ASSAY OF LIPASE: CPT | Mod: 91

## 2019-01-01 PROCEDURE — 99223 1ST HOSP IP/OBS HIGH 75: CPT | Mod: ,,, | Performed by: HOSPITALIST

## 2019-01-01 PROCEDURE — 99233 SBSQ HOSP IP/OBS HIGH 50: CPT | Mod: NTX,,, | Performed by: HOSPITALIST

## 2019-01-01 PROCEDURE — 84300 ASSAY OF URINE SODIUM: CPT

## 2019-01-01 PROCEDURE — 87205 SMEAR GRAM STAIN: CPT | Mod: NTX

## 2019-01-01 PROCEDURE — 80053 COMPREHEN METABOLIC PANEL: CPT | Mod: 91,NTX

## 2019-01-01 PROCEDURE — 86682 HELMINTH ANTIBODY: CPT

## 2019-01-01 PROCEDURE — 87206 SMEAR FLUORESCENT/ACID STAI: CPT

## 2019-01-01 PROCEDURE — 99223 1ST HOSP IP/OBS HIGH 75: CPT | Mod: NTX,,, | Performed by: INTERNAL MEDICINE

## 2019-01-01 PROCEDURE — 87102 FUNGUS ISOLATION CULTURE: CPT

## 2019-01-01 PROCEDURE — 85610 PROTHROMBIN TIME: CPT | Mod: 91

## 2019-01-01 PROCEDURE — 99223 PR INITIAL HOSPITAL CARE,LEVL III: ICD-10-PCS | Mod: 25,,, | Performed by: SURGERY

## 2019-01-01 PROCEDURE — 99233 SBSQ HOSP IP/OBS HIGH 50: CPT | Mod: NTX,,, | Performed by: SURGERY

## 2019-01-01 PROCEDURE — 99291 CRITICAL CARE FIRST HOUR: CPT | Mod: ,,, | Performed by: HOSPITALIST

## 2019-01-01 PROCEDURE — 80100008 HC CRRT DAILY MAINTENANCE: Mod: NTX

## 2019-01-01 PROCEDURE — 87102 FUNGUS ISOLATION CULTURE: CPT | Mod: NTX

## 2019-01-01 PROCEDURE — 82607 VITAMIN B-12: CPT

## 2019-01-01 PROCEDURE — 87040 BLOOD CULTURE FOR BACTERIA: CPT | Mod: 59

## 2019-01-01 PROCEDURE — 90792 PSYCH DIAG EVAL W/MED SRVCS: CPT | Mod: ,,, | Performed by: PSYCHIATRY & NEUROLOGY

## 2019-01-01 PROCEDURE — 63600175 PHARM REV CODE 636 W HCPCS: Mod: JG,NTX | Performed by: STUDENT IN AN ORGANIZED HEALTH CARE EDUCATION/TRAINING PROGRAM

## 2019-01-01 PROCEDURE — 63600175 PHARM REV CODE 636 W HCPCS: Mod: NTX

## 2019-01-01 PROCEDURE — 85730 THROMBOPLASTIN TIME PARTIAL: CPT | Mod: NTX

## 2019-01-01 PROCEDURE — 37799 UNLISTED PX VASCULAR SURGERY: CPT | Mod: NTX

## 2019-01-01 PROCEDURE — 36556 INSERT NON-TUNNEL CV CATH: CPT | Mod: RT,,, | Performed by: SURGERY

## 2019-01-01 PROCEDURE — 90471 IMMUNIZATION ADMIN: CPT | Mod: NTX | Performed by: HOSPITALIST

## 2019-01-01 PROCEDURE — 86480 TB TEST CELL IMMUN MEASURE: CPT

## 2019-01-01 PROCEDURE — 84100 ASSAY OF PHOSPHORUS: CPT | Mod: 91,NTX

## 2019-01-01 PROCEDURE — 89051 BODY FLUID CELL COUNT: CPT

## 2019-01-01 PROCEDURE — 85384 FIBRINOGEN ACTIVITY: CPT | Mod: NTX

## 2019-01-01 PROCEDURE — 85384 FIBRINOGEN ACTIVITY: CPT

## 2019-01-01 PROCEDURE — 80048 BASIC METABOLIC PNL TOTAL CA: CPT | Mod: 91,NTX

## 2019-01-01 PROCEDURE — 99223 PR INITIAL HOSPITAL CARE,LEVL III: ICD-10-PCS | Mod: ,,, | Performed by: HOSPITALIST

## 2019-01-01 PROCEDURE — 94002 VENT MGMT INPAT INIT DAY: CPT | Mod: NTX

## 2019-01-01 PROCEDURE — 27100171 HC OXYGEN HIGH FLOW UP TO 24 HOURS: Mod: NTX

## 2019-01-01 PROCEDURE — 87106 FUNGI IDENTIFICATION YEAST: CPT

## 2019-01-01 PROCEDURE — 63600175 PHARM REV CODE 636 W HCPCS: Mod: JG | Performed by: STUDENT IN AN ORGANIZED HEALTH CARE EDUCATION/TRAINING PROGRAM

## 2019-01-01 PROCEDURE — 99223 PR INITIAL HOSPITAL CARE,LEVL III: ICD-10-PCS | Mod: ,,, | Performed by: INTERNAL MEDICINE

## 2019-01-01 PROCEDURE — 82150 ASSAY OF AMYLASE: CPT | Mod: NTX

## 2019-01-01 PROCEDURE — 99291 PR CRITICAL CARE, E/M 30-74 MINUTES: ICD-10-PCS | Mod: ,,, | Performed by: HOSPITALIST

## 2019-01-01 PROCEDURE — 36556 PR INSERT NON-TUNNEL CV CATH 5+ YRS OLD: ICD-10-PCS | Mod: RT,,, | Performed by: SURGERY

## 2019-01-01 PROCEDURE — 85025 COMPLETE CBC W/AUTO DIFF WBC: CPT

## 2019-01-01 PROCEDURE — 87116 MYCOBACTERIA CULTURE: CPT | Mod: NTX

## 2019-01-01 PROCEDURE — 85007 BL SMEAR W/DIFF WBC COUNT: CPT | Mod: NTX

## 2019-01-01 PROCEDURE — 90715 TDAP VACCINE 7 YRS/> IM: CPT | Mod: NTX | Performed by: HOSPITALIST

## 2019-01-01 PROCEDURE — C9113 INJ PANTOPRAZOLE SODIUM, VIA: HCPCS | Mod: NTX | Performed by: STUDENT IN AN ORGANIZED HEALTH CARE EDUCATION/TRAINING PROGRAM

## 2019-01-01 PROCEDURE — 99232 SBSQ HOSP IP/OBS MODERATE 35: CPT | Mod: ,,, | Performed by: PSYCHIATRY & NEUROLOGY

## 2019-01-01 PROCEDURE — 27100092 HC HIGH FLOW DELIVERY CANNULA: Mod: NTX

## 2019-01-01 PROCEDURE — 94003 VENT MGMT INPAT SUBQ DAY: CPT | Mod: NTX

## 2019-01-01 PROCEDURE — 86481 TB AG RESPONSE T-CELL SUSP: CPT

## 2019-01-01 PROCEDURE — 99233 PR SUBSEQUENT HOSPITAL CARE,LEVL III: ICD-10-PCS | Mod: NTX,,, | Performed by: SURGERY

## 2019-01-01 PROCEDURE — 83690 ASSAY OF LIPASE: CPT | Mod: NTX

## 2019-01-01 PROCEDURE — 80307 DRUG TEST PRSMV CHEM ANLYZR: CPT

## 2019-01-01 PROCEDURE — 86901 BLOOD TYPING SEROLOGIC RH(D): CPT | Mod: NTX

## 2019-01-01 PROCEDURE — 86905 BLOOD TYPING RBC ANTIGENS: CPT

## 2019-01-01 PROCEDURE — 86665 EPSTEIN-BARR CAPSID VCA: CPT

## 2019-01-01 PROCEDURE — 87075 CULTR BACTERIA EXCEPT BLOOD: CPT

## 2019-01-01 PROCEDURE — 84132 ASSAY OF SERUM POTASSIUM: CPT | Mod: NTX

## 2019-01-01 PROCEDURE — 80202 ASSAY OF VANCOMYCIN: CPT

## 2019-01-01 PROCEDURE — 99231 SBSQ HOSP IP/OBS SF/LOW 25: CPT | Mod: ,,, | Performed by: INTERNAL MEDICINE

## 2019-01-01 PROCEDURE — 90792 PR PSYCHIATRIC DIAGNOSTIC EVALUATION W/MEDICAL SERVICES: ICD-10-PCS | Mod: ,,, | Performed by: PSYCHIATRY & NEUROLOGY

## 2019-01-01 PROCEDURE — C1751 CATH, INF, PER/CENT/MIDLINE: HCPCS

## 2019-01-01 PROCEDURE — 86790 VIRUS ANTIBODY NOS: CPT

## 2019-01-01 PROCEDURE — 36410 VNPNXR 3YR/> PHY/QHP DX/THER: CPT

## 2019-01-01 PROCEDURE — 89051 BODY FLUID CELL COUNT: CPT | Mod: NTX

## 2019-01-01 PROCEDURE — 99233 SBSQ HOSP IP/OBS HIGH 50: CPT | Mod: ,,, | Performed by: SURGERY

## 2019-01-01 PROCEDURE — 86803 HEPATITIS C AB TEST: CPT

## 2019-01-01 PROCEDURE — 99231 PR SUBSEQUENT HOSPITAL CARE,LEVL I: ICD-10-PCS | Mod: ,,, | Performed by: INTERNAL MEDICINE

## 2019-01-01 PROCEDURE — 82728 ASSAY OF FERRITIN: CPT

## 2019-01-01 PROCEDURE — P9045 ALBUMIN (HUMAN), 5%, 250 ML: HCPCS | Mod: JG | Performed by: STUDENT IN AN ORGANIZED HEALTH CARE EDUCATION/TRAINING PROGRAM

## 2019-01-01 PROCEDURE — 85014 HEMATOCRIT: CPT | Mod: NTX

## 2019-01-01 RX ORDER — INDOMETHACIN 25 MG/1
100 CAPSULE ORAL ONCE
Status: COMPLETED | OUTPATIENT
Start: 2019-01-01 | End: 2019-01-01

## 2019-01-01 RX ORDER — MAGNESIUM SULFATE HEPTAHYDRATE 40 MG/ML
2 INJECTION, SOLUTION INTRAVENOUS
Status: ACTIVE | OUTPATIENT
Start: 2019-01-01 | End: 2019-01-01

## 2019-01-01 RX ORDER — LORAZEPAM 2 MG/ML
1 INJECTION INTRAMUSCULAR
Status: DISCONTINUED | OUTPATIENT
Start: 2019-01-01 | End: 2019-01-01 | Stop reason: HOSPADM

## 2019-01-01 RX ORDER — CEFTRIAXONE 1 G/1
1 INJECTION, POWDER, FOR SOLUTION INTRAMUSCULAR; INTRAVENOUS
Status: DISCONTINUED | OUTPATIENT
Start: 2019-01-01 | End: 2019-01-01

## 2019-01-01 RX ORDER — MULTIVITAMIN
1 TABLET ORAL DAILY
Status: ON HOLD | COMMUNITY
End: 2019-01-01 | Stop reason: HOSPADM

## 2019-01-01 RX ORDER — ALBUMIN HUMAN 250 G/1000ML
25 SOLUTION INTRAVENOUS ONCE
Status: COMPLETED | OUTPATIENT
Start: 2019-01-01 | End: 2019-01-01

## 2019-01-01 RX ORDER — NOREPINEPHRINE BITARTRATE/D5W 4MG/250ML
PLASTIC BAG, INJECTION (ML) INTRAVENOUS
Status: COMPLETED
Start: 2019-01-01 | End: 2019-01-01

## 2019-01-01 RX ORDER — TRAMADOL HYDROCHLORIDE 50 MG/1
50 TABLET ORAL EVERY 6 HOURS PRN
Status: DISCONTINUED | OUTPATIENT
Start: 2019-01-01 | End: 2019-01-01 | Stop reason: HOSPADM

## 2019-01-01 RX ORDER — HYDROCODONE BITARTRATE AND ACETAMINOPHEN 500; 5 MG/1; MG/1
TABLET ORAL
Status: DISCONTINUED | OUTPATIENT
Start: 2019-01-01 | End: 2019-01-01 | Stop reason: HOSPADM

## 2019-01-01 RX ORDER — AMOXICILLIN 250 MG
1 CAPSULE ORAL 2 TIMES DAILY
Status: DISCONTINUED | OUTPATIENT
Start: 2019-01-01 | End: 2019-01-01 | Stop reason: HOSPADM

## 2019-01-01 RX ORDER — CALCIUM CARBONATE 200(500)MG
500 TABLET,CHEWABLE ORAL 3 TIMES DAILY PRN
Status: DISCONTINUED | OUTPATIENT
Start: 2019-01-01 | End: 2019-01-01 | Stop reason: HOSPADM

## 2019-01-01 RX ORDER — EPINEPHRINE 1 MG/ML
INJECTION, SOLUTION INTRACARDIAC; INTRAMUSCULAR; INTRAVENOUS; SUBCUTANEOUS
Status: DISCONTINUED
Start: 2019-01-01 | End: 2019-01-01 | Stop reason: WASHOUT

## 2019-01-01 RX ORDER — DEXTROSE MONOHYDRATE 100 MG/ML
INJECTION, SOLUTION INTRAVENOUS CONTINUOUS
Status: DISCONTINUED | OUTPATIENT
Start: 2019-01-01 | End: 2019-01-01 | Stop reason: HOSPADM

## 2019-01-01 RX ORDER — NOREPINEPHRINE BITARTRATE/D5W 4MG/250ML
0.02 PLASTIC BAG, INJECTION (ML) INTRAVENOUS CONTINUOUS
Status: DISCONTINUED | OUTPATIENT
Start: 2019-01-01 | End: 2019-01-01

## 2019-01-01 RX ORDER — ROCURONIUM BROMIDE 10 MG/ML
50 INJECTION, SOLUTION INTRAVENOUS ONCE
Status: COMPLETED | OUTPATIENT
Start: 2019-01-01 | End: 2019-01-01

## 2019-01-01 RX ORDER — LIDOCAINE HYDROCHLORIDE 10 MG/ML
INJECTION INFILTRATION; PERINEURAL
Status: COMPLETED
Start: 2019-01-01 | End: 2019-01-01

## 2019-01-01 RX ORDER — FENTANYL CITRATE 50 UG/ML
50 INJECTION, SOLUTION INTRAMUSCULAR; INTRAVENOUS ONCE
Status: COMPLETED | OUTPATIENT
Start: 2019-01-01 | End: 2019-01-01

## 2019-01-01 RX ORDER — ALBUMIN HUMAN 50 G/1000ML
25 SOLUTION INTRAVENOUS ONCE
Status: COMPLETED | OUTPATIENT
Start: 2019-01-01 | End: 2019-01-01

## 2019-01-01 RX ORDER — ALBUMIN HUMAN 250 G/1000ML
25 SOLUTION INTRAVENOUS 2 TIMES DAILY
Status: DISCONTINUED | OUTPATIENT
Start: 2019-01-01 | End: 2019-01-01

## 2019-01-01 RX ORDER — POLYETHYLENE GLYCOL 3350 17 G/17G
17 POWDER, FOR SOLUTION ORAL DAILY
Status: DISCONTINUED | OUTPATIENT
Start: 2019-01-01 | End: 2019-01-01 | Stop reason: HOSPADM

## 2019-01-01 RX ORDER — MIDAZOLAM HYDROCHLORIDE 1 MG/ML
2 INJECTION INTRAMUSCULAR; INTRAVENOUS ONCE
Status: COMPLETED | OUTPATIENT
Start: 2019-01-01 | End: 2019-01-01

## 2019-01-01 RX ORDER — IBUPROFEN 200 MG
24 TABLET ORAL
Status: DISCONTINUED | OUTPATIENT
Start: 2019-01-01 | End: 2019-01-01 | Stop reason: HOSPADM

## 2019-01-01 RX ORDER — MIDODRINE HYDROCHLORIDE 5 MG/1
10 TABLET ORAL 3 TIMES DAILY
Status: DISCONTINUED | OUTPATIENT
Start: 2019-01-01 | End: 2019-01-01

## 2019-01-01 RX ORDER — ETOMIDATE 2 MG/ML
20 INJECTION INTRAVENOUS ONCE
Status: COMPLETED | OUTPATIENT
Start: 2019-01-01 | End: 2019-01-01

## 2019-01-01 RX ORDER — ONDANSETRON 4 MG/1
8 TABLET, FILM COATED ORAL EVERY 6 HOURS PRN
Status: DISCONTINUED | OUTPATIENT
Start: 2019-01-01 | End: 2019-01-01

## 2019-01-01 RX ORDER — SPIRONOLACTONE 25 MG/1
25 TABLET ORAL DAILY
Status: ON HOLD | COMMUNITY
End: 2019-01-01 | Stop reason: HOSPADM

## 2019-01-01 RX ORDER — IBUPROFEN 200 MG
16 TABLET ORAL
Status: DISCONTINUED | OUTPATIENT
Start: 2019-01-01 | End: 2019-01-01 | Stop reason: HOSPADM

## 2019-01-01 RX ORDER — HYDROCODONE BITARTRATE AND ACETAMINOPHEN 500; 5 MG/1; MG/1
TABLET ORAL
Status: DISCONTINUED | OUTPATIENT
Start: 2019-01-01 | End: 2019-01-01

## 2019-01-01 RX ORDER — ONDANSETRON 4 MG/1
4 TABLET, FILM COATED ORAL EVERY 6 HOURS PRN
Status: DISCONTINUED | OUTPATIENT
Start: 2019-01-01 | End: 2019-01-01

## 2019-01-01 RX ORDER — PHENYLEPHRINE HYDROCHLORIDE 10 MG/ML
300 INJECTION INTRAVENOUS ONCE
Status: DISCONTINUED | OUTPATIENT
Start: 2019-01-01 | End: 2019-01-01

## 2019-01-01 RX ORDER — ALBUMIN HUMAN 250 G/1000ML
50 SOLUTION INTRAVENOUS ONCE
Status: COMPLETED | OUTPATIENT
Start: 2019-01-01 | End: 2019-01-01

## 2019-01-01 RX ORDER — PHENYLEPHRINE HCL IN 0.9% NACL 1 MG/10 ML
300 SYRINGE (ML) INTRAVENOUS ONCE
Status: COMPLETED | OUTPATIENT
Start: 2019-01-01 | End: 2019-01-01

## 2019-01-01 RX ORDER — ACETAMINOPHEN 325 MG/1
650 TABLET ORAL EVERY 4 HOURS PRN
Status: DISCONTINUED | OUTPATIENT
Start: 2019-01-01 | End: 2019-01-01

## 2019-01-01 RX ORDER — SODIUM CHLORIDE 0.9 % (FLUSH) 0.9 %
10 SYRINGE (ML) INJECTION
Status: DISCONTINUED | OUTPATIENT
Start: 2019-01-01 | End: 2019-01-01

## 2019-01-01 RX ORDER — BISACODYL 10 MG
10 SUPPOSITORY, RECTAL RECTAL ONCE
Status: COMPLETED | OUTPATIENT
Start: 2019-01-01 | End: 2019-01-01

## 2019-01-01 RX ORDER — MAGNESIUM SULFATE HEPTAHYDRATE 40 MG/ML
2 INJECTION, SOLUTION INTRAVENOUS
Status: DISCONTINUED | OUTPATIENT
Start: 2019-01-01 | End: 2019-01-01 | Stop reason: HOSPADM

## 2019-01-01 RX ORDER — BISACODYL 10 MG
10 SUPPOSITORY, RECTAL RECTAL DAILY PRN
Status: DISCONTINUED | OUTPATIENT
Start: 2019-01-01 | End: 2019-01-01 | Stop reason: HOSPADM

## 2019-01-01 RX ORDER — LACTULOSE 10 G/15ML
20 SOLUTION ORAL ONCE
Status: COMPLETED | OUTPATIENT
Start: 2019-01-01 | End: 2019-01-01

## 2019-01-01 RX ORDER — ADHESIVE BANDAGE
30 BANDAGE TOPICAL ONCE
Status: COMPLETED | OUTPATIENT
Start: 2019-01-01 | End: 2019-01-01

## 2019-01-01 RX ORDER — ALBUMIN HUMAN 50 G/1000ML
12.5 SOLUTION INTRAVENOUS ONCE
Status: COMPLETED | OUTPATIENT
Start: 2019-01-01 | End: 2019-01-01

## 2019-01-01 RX ORDER — ACETAMINOPHEN 325 MG/1
325 TABLET ORAL EVERY 4 HOURS PRN
Status: DISCONTINUED | OUTPATIENT
Start: 2019-01-01 | End: 2019-01-01 | Stop reason: HOSPADM

## 2019-01-01 RX ORDER — CALCIUM CHLORIDE IN 0.9 % NACL 1 G/100 ML
1 INTRAVENOUS SOLUTION, PIGGYBACK (ML) INTRAVENOUS ONCE
Status: COMPLETED | OUTPATIENT
Start: 2019-01-01 | End: 2019-01-01

## 2019-01-01 RX ORDER — MEROPENEM AND SODIUM CHLORIDE 1 G/50ML
1 INJECTION, SOLUTION INTRAVENOUS
Status: DISCONTINUED | OUTPATIENT
Start: 2019-01-01 | End: 2019-01-01 | Stop reason: HOSPADM

## 2019-01-01 RX ORDER — LIDOCAINE HYDROCHLORIDE 10 MG/ML
1 INJECTION INFILTRATION; PERINEURAL ONCE
Status: COMPLETED | OUTPATIENT
Start: 2019-01-01 | End: 2019-01-01

## 2019-01-01 RX ORDER — ALBUMIN HUMAN 250 G/1000ML
25 SOLUTION INTRAVENOUS EVERY 6 HOURS
Status: DISPENSED | OUTPATIENT
Start: 2019-01-01 | End: 2019-01-01

## 2019-01-01 RX ORDER — CIPROFLOXACIN 250 MG/1
250 TABLET, FILM COATED ORAL EVERY 24 HOURS
Status: DISCONTINUED | OUTPATIENT
Start: 2019-01-01 | End: 2019-01-01

## 2019-01-01 RX ORDER — ETOMIDATE 2 MG/ML
INJECTION INTRAVENOUS
Status: COMPLETED
Start: 2019-01-01 | End: 2019-01-01

## 2019-01-01 RX ORDER — ALBUMIN HUMAN 50 G/1000ML
SOLUTION INTRAVENOUS
Status: COMPLETED
Start: 2019-01-01 | End: 2019-01-01

## 2019-01-01 RX ORDER — SEVELAMER CARBONATE 800 MG/1
1600 TABLET, FILM COATED ORAL
Status: DISCONTINUED | OUTPATIENT
Start: 2019-01-01 | End: 2019-01-01

## 2019-01-01 RX ORDER — ALBUMIN HUMAN 250 G/1000ML
25 SOLUTION INTRAVENOUS 3 TIMES DAILY
Status: COMPLETED | OUTPATIENT
Start: 2019-01-01 | End: 2019-01-01

## 2019-01-01 RX ORDER — HEPARIN SODIUM 5000 [USP'U]/ML
5000 INJECTION, SOLUTION INTRAVENOUS; SUBCUTANEOUS EVERY 8 HOURS
Status: DISCONTINUED | OUTPATIENT
Start: 2019-01-01 | End: 2019-01-01

## 2019-01-01 RX ORDER — PSEUDOEPHEDRINE/ACETAMINOPHEN 30MG-500MG
100 TABLET ORAL ONCE AS NEEDED
Status: DISCONTINUED | OUTPATIENT
Start: 2019-01-01 | End: 2019-01-01

## 2019-01-01 RX ORDER — HYDROMORPHONE HYDROCHLORIDE 1 MG/ML
0.5 INJECTION, SOLUTION INTRAMUSCULAR; INTRAVENOUS; SUBCUTANEOUS
Status: DISCONTINUED | OUTPATIENT
Start: 2019-01-01 | End: 2019-01-01 | Stop reason: HOSPADM

## 2019-01-01 RX ORDER — PHENYLEPHRINE HCL IN 0.9% NACL 1 MG/10 ML
SYRINGE (ML) INTRAVENOUS
Status: COMPLETED
Start: 2019-01-01 | End: 2019-01-01

## 2019-01-01 RX ORDER — ALBUMIN HUMAN 250 G/1000ML
25 SOLUTION INTRAVENOUS ONCE
Status: DISCONTINUED | OUTPATIENT
Start: 2019-01-01 | End: 2019-01-01

## 2019-01-01 RX ORDER — FLUCONAZOLE 100 MG/1
100 TABLET ORAL DAILY
Status: DISCONTINUED | OUTPATIENT
Start: 2019-01-01 | End: 2019-01-01

## 2019-01-01 RX ORDER — SYRING-NEEDL,DISP,INSUL,0.3 ML 29 G X1/2"
296 SYRINGE, EMPTY DISPOSABLE MISCELLANEOUS ONCE AS NEEDED
Status: DISCONTINUED | OUTPATIENT
Start: 2019-01-01 | End: 2019-01-01

## 2019-01-01 RX ORDER — HYDROCODONE BITARTRATE AND ACETAMINOPHEN 500; 5 MG/1; MG/1
TABLET ORAL CONTINUOUS
Status: DISCONTINUED | OUTPATIENT
Start: 2019-01-01 | End: 2019-01-01 | Stop reason: HOSPADM

## 2019-01-01 RX ORDER — OCTREOTIDE ACETATE 100 UG/ML
100 INJECTION, SOLUTION INTRAVENOUS; SUBCUTANEOUS EVERY 8 HOURS
Status: DISCONTINUED | OUTPATIENT
Start: 2019-01-01 | End: 2019-01-01

## 2019-01-01 RX ORDER — INDOMETHACIN 25 MG/1
CAPSULE ORAL
Status: COMPLETED
Start: 2019-01-01 | End: 2019-01-01

## 2019-01-01 RX ORDER — MIDODRINE HYDROCHLORIDE 5 MG/1
15 TABLET ORAL 3 TIMES DAILY
Status: DISCONTINUED | OUTPATIENT
Start: 2019-01-01 | End: 2019-01-01 | Stop reason: HOSPADM

## 2019-01-01 RX ORDER — FLUCONAZOLE 100 MG/1
200 TABLET ORAL DAILY
Status: DISCONTINUED | OUTPATIENT
Start: 2019-01-01 | End: 2019-01-01

## 2019-01-01 RX ORDER — AMOXICILLIN 250 MG
1 CAPSULE ORAL DAILY
Status: DISCONTINUED | OUTPATIENT
Start: 2019-01-01 | End: 2019-01-01

## 2019-01-01 RX ORDER — SODIUM CHLORIDE 0.9 % (FLUSH) 0.9 %
10 SYRINGE (ML) INJECTION
Status: DISCONTINUED | OUTPATIENT
Start: 2019-01-01 | End: 2019-01-01 | Stop reason: HOSPADM

## 2019-01-01 RX ORDER — HYDROCODONE BITARTRATE AND ACETAMINOPHEN 500; 5 MG/1; MG/1
TABLET ORAL CONTINUOUS
Status: ACTIVE | OUTPATIENT
Start: 2019-01-01 | End: 2019-01-01

## 2019-01-01 RX ORDER — FUROSEMIDE 40 MG/1
40 TABLET ORAL DAILY
Status: ON HOLD | COMMUNITY
End: 2019-01-01 | Stop reason: HOSPADM

## 2019-01-01 RX ORDER — FUROSEMIDE 10 MG/ML
120 INJECTION INTRAMUSCULAR; INTRAVENOUS ONCE
Status: COMPLETED | OUTPATIENT
Start: 2019-01-01 | End: 2019-01-01

## 2019-01-01 RX ORDER — ROCURONIUM BROMIDE 10 MG/ML
INJECTION, SOLUTION INTRAVENOUS
Status: COMPLETED
Start: 2019-01-01 | End: 2019-01-01

## 2019-01-01 RX ORDER — FENTANYL CITRATE 50 UG/ML
INJECTION, SOLUTION INTRAMUSCULAR; INTRAVENOUS
Status: COMPLETED
Start: 2019-01-01 | End: 2019-01-01

## 2019-01-01 RX ORDER — OXYCODONE HYDROCHLORIDE 5 MG/1
5 TABLET ORAL EVERY 6 HOURS PRN
Status: DISCONTINUED | OUTPATIENT
Start: 2019-01-01 | End: 2019-01-01 | Stop reason: HOSPADM

## 2019-01-01 RX ORDER — ONDANSETRON 2 MG/ML
4 INJECTION INTRAMUSCULAR; INTRAVENOUS EVERY 6 HOURS PRN
Status: DISCONTINUED | OUTPATIENT
Start: 2019-01-01 | End: 2019-01-01 | Stop reason: HOSPADM

## 2019-01-01 RX ORDER — GLUCAGON 1 MG
1 KIT INJECTION
Status: DISCONTINUED | OUTPATIENT
Start: 2019-01-01 | End: 2019-01-01 | Stop reason: HOSPADM

## 2019-01-01 RX ORDER — SEVELAMER CARBONATE 800 MG/1
800 TABLET, FILM COATED ORAL
Status: DISCONTINUED | OUTPATIENT
Start: 2019-01-01 | End: 2019-01-01

## 2019-01-01 RX ORDER — THIAMINE HCL 100 MG
100 TABLET ORAL DAILY
Status: DISCONTINUED | OUTPATIENT
Start: 2019-01-01 | End: 2019-01-01 | Stop reason: HOSPADM

## 2019-01-01 RX ORDER — ALUMINUM HYDROXIDE, MAGNESIUM HYDROXIDE, AND SIMETHICONE 2400; 240; 2400 MG/30ML; MG/30ML; MG/30ML
30 SUSPENSION ORAL EVERY 6 HOURS PRN
Status: DISCONTINUED | OUTPATIENT
Start: 2019-01-01 | End: 2019-01-01

## 2019-01-01 RX ORDER — MIDAZOLAM HYDROCHLORIDE 1 MG/ML
INJECTION INTRAMUSCULAR; INTRAVENOUS
Status: COMPLETED
Start: 2019-01-01 | End: 2019-01-01

## 2019-01-01 RX ORDER — PROPOFOL 10 MG/ML
5 INJECTION, EMULSION INTRAVENOUS CONTINUOUS
Status: DISCONTINUED | OUTPATIENT
Start: 2019-01-01 | End: 2019-01-01 | Stop reason: HOSPADM

## 2019-01-01 RX ORDER — PHENYLEPHRINE HCL IN 0.9% NACL 1 MG/10 ML
30 SYRINGE (ML) INTRAVENOUS ONCE
Status: DISCONTINUED | OUTPATIENT
Start: 2019-01-01 | End: 2019-01-01

## 2019-01-01 RX ORDER — METOPROLOL TARTRATE 1 MG/ML
INJECTION, SOLUTION INTRAVENOUS
Status: COMPLETED
Start: 2019-01-01 | End: 2019-01-01

## 2019-01-01 RX ORDER — PROPOFOL 10 MG/ML
INJECTION, EMULSION INTRAVENOUS
Status: COMPLETED
Start: 2019-01-01 | End: 2019-01-01

## 2019-01-01 RX ORDER — FOLIC ACID 1 MG/1
1 TABLET ORAL DAILY
Status: DISCONTINUED | OUTPATIENT
Start: 2019-01-01 | End: 2019-01-01 | Stop reason: HOSPADM

## 2019-01-01 RX ADMIN — Medication 0.2 MCG/KG/MIN: at 01:12

## 2019-01-01 RX ADMIN — ONDANSETRON 4 MG: 2 INJECTION INTRAMUSCULAR; INTRAVENOUS at 11:12

## 2019-01-01 RX ADMIN — Medication 0.02 MCG/KG/MIN: at 05:12

## 2019-01-01 RX ADMIN — OCTREOTIDE ACETATE 100 MCG: 100 INJECTION, SOLUTION INTRAVENOUS; SUBCUTANEOUS at 01:11

## 2019-01-01 RX ADMIN — SODIUM CHLORIDE, SODIUM LACTATE, POTASSIUM CHLORIDE, AND CALCIUM CHLORIDE 2000 ML: .6; .31; .03; .02 INJECTION, SOLUTION INTRAVENOUS at 04:12

## 2019-01-01 RX ADMIN — POLYETHYLENE GLYCOL 3350 17 G: 17 POWDER, FOR SOLUTION ORAL at 08:12

## 2019-01-01 RX ADMIN — MIDODRINE HYDROCHLORIDE 15 MG: 5 TABLET ORAL at 08:12

## 2019-01-01 RX ADMIN — SENNOSIDES AND DOCUSATE SODIUM 1 TABLET: 8.6; 5 TABLET ORAL at 11:12

## 2019-01-01 RX ADMIN — VASOPRESSIN 0.04 UNITS/MIN: 20 INJECTION INTRAVENOUS at 02:12

## 2019-01-01 RX ADMIN — PIPERACILLIN SODIUM AND TAZOBACTAM SODIUM 4.5 G: 4; .5 INJECTION, POWDER, LYOPHILIZED, FOR SOLUTION INTRAVENOUS at 05:11

## 2019-01-01 RX ADMIN — DEXTROSE 125 ML: 10 SOLUTION INTRAVENOUS at 06:12

## 2019-01-01 RX ADMIN — Medication 300 MCG: at 12:12

## 2019-01-01 RX ADMIN — Medication 100 MG: at 09:11

## 2019-01-01 RX ADMIN — MIDODRINE HYDROCHLORIDE 15 MG: 5 TABLET ORAL at 09:12

## 2019-01-01 RX ADMIN — MIDODRINE HYDROCHLORIDE 15 MG: 5 TABLET ORAL at 09:11

## 2019-01-01 RX ADMIN — OXYCODONE HYDROCHLORIDE 5 MG: 5 TABLET ORAL at 06:12

## 2019-01-01 RX ADMIN — TRAMADOL HYDROCHLORIDE 50 MG: 50 TABLET, FILM COATED ORAL at 08:12

## 2019-01-01 RX ADMIN — PIPERACILLIN SODIUM AND TAZOBACTAM SODIUM 4.5 G: 4; .5 INJECTION, POWDER, LYOPHILIZED, FOR SOLUTION INTRAVENOUS at 02:11

## 2019-01-01 RX ADMIN — SENNOSIDES AND DOCUSATE SODIUM 1 TABLET: 8.6; 5 TABLET ORAL at 08:12

## 2019-01-01 RX ADMIN — SENNOSIDES AND DOCUSATE SODIUM 1 TABLET: 8.6; 5 TABLET ORAL at 09:12

## 2019-01-01 RX ADMIN — VASOPRESSIN 0.1 UNITS/MIN: 20 INJECTION INTRAVENOUS at 06:12

## 2019-01-01 RX ADMIN — SENNOSIDES AND DOCUSATE SODIUM 1 TABLET: 8.6; 5 TABLET ORAL at 09:11

## 2019-01-01 RX ADMIN — CALCIUM CARBONATE (ANTACID) CHEW TAB 500 MG 500 MG: 500 CHEW TAB at 05:12

## 2019-01-01 RX ADMIN — DEXTROSE 250 ML: 10 SOLUTION INTRAVENOUS at 04:12

## 2019-01-01 RX ADMIN — NOREPINEPHRINE BITARTRATE 2.32 MCG/KG/MIN: 1 INJECTION, SOLUTION, CONCENTRATE INTRAVENOUS at 04:12

## 2019-01-01 RX ADMIN — SODIUM CHLORIDE 500 ML: 0.9 INJECTION, SOLUTION INTRAVENOUS at 10:11

## 2019-01-01 RX ADMIN — ONDANSETRON HYDROCHLORIDE 8 MG: 4 TABLET, FILM COATED ORAL at 01:12

## 2019-01-01 RX ADMIN — MIDODRINE HYDROCHLORIDE 10 MG: 5 TABLET ORAL at 09:11

## 2019-01-01 RX ADMIN — MEROPENEM AND SODIUM CHLORIDE 1 G: 1 INJECTION, SOLUTION INTRAVENOUS at 04:12

## 2019-01-01 RX ADMIN — NOREPINEPHRINE BITARTRATE 0.4 MCG/KG/MIN: 1 INJECTION, SOLUTION, CONCENTRATE INTRAVENOUS at 08:12

## 2019-01-01 RX ADMIN — SEVELAMER CARBONATE 1600 MG: 800 TABLET, FILM COATED ORAL at 11:12

## 2019-01-01 RX ADMIN — Medication 0.06 MCG/KG/MIN: at 11:12

## 2019-01-01 RX ADMIN — ALBUMIN (HUMAN) 25 G: 25 SOLUTION INTRAVENOUS at 11:11

## 2019-01-01 RX ADMIN — OCTREOTIDE ACETATE 100 MCG: 100 INJECTION, SOLUTION INTRAVENOUS; SUBCUTANEOUS at 05:11

## 2019-01-01 RX ADMIN — OCTREOTIDE ACETATE 100 MCG: 100 INJECTION, SOLUTION INTRAVENOUS; SUBCUTANEOUS at 02:12

## 2019-01-01 RX ADMIN — OXYCODONE HYDROCHLORIDE 5 MG: 5 TABLET ORAL at 09:12

## 2019-01-01 RX ADMIN — Medication 100 MG: at 11:12

## 2019-01-01 RX ADMIN — OCTREOTIDE ACETATE 100 MCG: 100 INJECTION, SOLUTION INTRAVENOUS; SUBCUTANEOUS at 09:11

## 2019-01-01 RX ADMIN — PIPERACILLIN SODIUM AND TAZOBACTAM SODIUM 4.5 G: 4; .5 INJECTION, POWDER, LYOPHILIZED, FOR SOLUTION INTRAVENOUS at 06:11

## 2019-01-01 RX ADMIN — VASOPRESSIN 0.04 UNITS/MIN: 20 INJECTION INTRAVENOUS at 03:12

## 2019-01-01 RX ADMIN — PHYTONADIONE 10 MG: 10 INJECTION, EMULSION INTRAMUSCULAR; INTRAVENOUS; SUBCUTANEOUS at 09:11

## 2019-01-01 RX ADMIN — MICAFUNGIN SODIUM 100 MG: 20 INJECTION, POWDER, LYOPHILIZED, FOR SOLUTION INTRAVENOUS at 12:12

## 2019-01-01 RX ADMIN — FOLIC ACID 1 MG: 1 TABLET ORAL at 09:12

## 2019-01-01 RX ADMIN — Medication 0.32 MCG/KG/MIN: at 08:12

## 2019-01-01 RX ADMIN — LACTULOSE 20 G: 20 SOLUTION ORAL at 02:12

## 2019-01-01 RX ADMIN — SENNOSIDES AND DOCUSATE SODIUM 1 TABLET: 8.6; 5 TABLET ORAL at 04:11

## 2019-01-01 RX ADMIN — POLYETHYLENE GLYCOL 3350 17 G: 17 POWDER, FOR SOLUTION ORAL at 09:12

## 2019-01-01 RX ADMIN — PROPOFOL 30 MCG/KG/MIN: 10 INJECTION, EMULSION INTRAVENOUS at 12:12

## 2019-01-01 RX ADMIN — FLUCONAZOLE 100 MG: 100 TABLET ORAL at 08:12

## 2019-01-01 RX ADMIN — MIDODRINE HYDROCHLORIDE 15 MG: 5 TABLET ORAL at 11:12

## 2019-01-01 RX ADMIN — INDOMETHACIN 100 MEQ: 25 CAPSULE ORAL at 03:12

## 2019-01-01 RX ADMIN — FLUCONAZOLE 200 MG: 100 TABLET ORAL at 12:11

## 2019-01-01 RX ADMIN — SEVELAMER CARBONATE 1600 MG: 800 TABLET, FILM COATED ORAL at 09:12

## 2019-01-01 RX ADMIN — MIDODRINE HYDROCHLORIDE 15 MG: 5 TABLET ORAL at 08:11

## 2019-01-01 RX ADMIN — PIPERACILLIN SODIUM AND TAZOBACTAM SODIUM 4.5 G: 4; .5 INJECTION, POWDER, LYOPHILIZED, FOR SOLUTION INTRAVENOUS at 03:11

## 2019-01-01 RX ADMIN — TRAMADOL HYDROCHLORIDE 50 MG: 50 TABLET, FILM COATED ORAL at 12:11

## 2019-01-01 RX ADMIN — ALBUMIN (HUMAN) 25 G: 12.5 SOLUTION INTRAVENOUS at 11:12

## 2019-01-01 RX ADMIN — ALBUMIN (HUMAN) 25 G: 25 SOLUTION INTRAVENOUS at 02:11

## 2019-01-01 RX ADMIN — VASOPRESSIN 0.04 UNITS/MIN: 20 INJECTION INTRAVENOUS at 09:12

## 2019-01-01 RX ADMIN — MIDAZOLAM HYDROCHLORIDE 2 MG: 1 INJECTION, SOLUTION INTRAMUSCULAR; INTRAVENOUS at 12:12

## 2019-01-01 RX ADMIN — MIDODRINE HYDROCHLORIDE 15 MG: 5 TABLET ORAL at 03:12

## 2019-01-01 RX ADMIN — CIPROFLOXACIN HYDROCHLORIDE 250 MG: 250 TABLET, FILM COATED ORAL at 09:12

## 2019-01-01 RX ADMIN — POLYETHYLENE GLYCOL 3350 17 G: 17 POWDER, FOR SOLUTION ORAL at 09:11

## 2019-01-01 RX ADMIN — SEVELAMER CARBONATE 1600 MG: 800 TABLET, FILM COATED ORAL at 01:12

## 2019-01-01 RX ADMIN — ALBUMIN (HUMAN) 25 G: 25 SOLUTION INTRAVENOUS at 04:11

## 2019-01-01 RX ADMIN — MEROPENEM AND SODIUM CHLORIDE 1 G: 1 INJECTION, SOLUTION INTRAVENOUS at 08:12

## 2019-01-01 RX ADMIN — POLYETHYLENE GLYCOL 3350 17 G: 17 POWDER, FOR SOLUTION ORAL at 08:11

## 2019-01-01 RX ADMIN — PANTOPRAZOLE SODIUM 8 MG/HR: 40 INJECTION, POWDER, FOR SOLUTION INTRAVENOUS at 06:12

## 2019-01-01 RX ADMIN — SODIUM CHLORIDE 1000 ML: 0.9 INJECTION, SOLUTION INTRAVENOUS at 05:12

## 2019-01-01 RX ADMIN — ALBUMIN (HUMAN) 25 G: 25 SOLUTION INTRAVENOUS at 05:11

## 2019-01-01 RX ADMIN — OCTREOTIDE ACETATE 100 MCG: 100 INJECTION, SOLUTION INTRAVENOUS; SUBCUTANEOUS at 02:11

## 2019-01-01 RX ADMIN — ETOMIDATE 20 MG: 2 INJECTION INTRAVENOUS at 11:12

## 2019-01-01 RX ADMIN — TRAMADOL HYDROCHLORIDE 50 MG: 50 TABLET, FILM COATED ORAL at 01:12

## 2019-01-01 RX ADMIN — OCTREOTIDE ACETATE 100 MCG: 100 INJECTION, SOLUTION INTRAVENOUS; SUBCUTANEOUS at 10:11

## 2019-01-01 RX ADMIN — MAGNESIUM HYDROXIDE 2400 MG: 400 SUSPENSION ORAL at 04:11

## 2019-01-01 RX ADMIN — MICAFUNGIN SODIUM 100 MG: 20 INJECTION, POWDER, LYOPHILIZED, FOR SOLUTION INTRAVENOUS at 10:12

## 2019-01-01 RX ADMIN — SODIUM CHLORIDE: 0.9 INJECTION, SOLUTION INTRAVENOUS at 04:12

## 2019-01-01 RX ADMIN — MIDODRINE HYDROCHLORIDE 15 MG: 5 TABLET ORAL at 05:11

## 2019-01-01 RX ADMIN — VASOPRESSIN 0.04 UNITS/MIN: 20 INJECTION INTRAVENOUS at 10:12

## 2019-01-01 RX ADMIN — Medication 0.26 MCG/KG/MIN: at 05:12

## 2019-01-01 RX ADMIN — BISACODYL 10 MG: 10 SUPPOSITORY RECTAL at 04:12

## 2019-01-01 RX ADMIN — TRAMADOL HYDROCHLORIDE 50 MG: 50 TABLET, FILM COATED ORAL at 10:11

## 2019-01-01 RX ADMIN — PIPERACILLIN SODIUM AND TAZOBACTAM SODIUM 4.5 G: 4; .5 INJECTION, POWDER, LYOPHILIZED, FOR SOLUTION INTRAVENOUS at 10:11

## 2019-01-01 RX ADMIN — VASOPRESSIN 0.08 UNITS/MIN: 20 INJECTION INTRAVENOUS at 01:12

## 2019-01-01 RX ADMIN — TRAMADOL HYDROCHLORIDE 50 MG: 50 TABLET, FILM COATED ORAL at 02:12

## 2019-01-01 RX ADMIN — MICAFUNGIN SODIUM 100 MG: 20 INJECTION, POWDER, LYOPHILIZED, FOR SOLUTION INTRAVENOUS at 11:12

## 2019-01-01 RX ADMIN — FUROSEMIDE 120 MG: 10 INJECTION, SOLUTION INTRAMUSCULAR; INTRAVENOUS at 09:12

## 2019-01-01 RX ADMIN — PHYTONADIONE 10 MG: 10 INJECTION, EMULSION INTRAMUSCULAR; INTRAVENOUS; SUBCUTANEOUS at 03:11

## 2019-01-01 RX ADMIN — Medication 0.38 MCG/KG/MIN: at 06:12

## 2019-01-01 RX ADMIN — SODIUM CHLORIDE: 0.9 INJECTION, SOLUTION INTRAVENOUS at 05:12

## 2019-01-01 RX ADMIN — PIPERACILLIN SODIUM AND TAZOBACTAM SODIUM 4.5 G: 4; .5 INJECTION, POWDER, LYOPHILIZED, FOR SOLUTION INTRAVENOUS at 09:11

## 2019-01-01 RX ADMIN — SEVELAMER CARBONATE 800 MG: 800 TABLET, FILM COATED ORAL at 12:11

## 2019-01-01 RX ADMIN — OXYCODONE HYDROCHLORIDE 5 MG: 5 TABLET ORAL at 02:12

## 2019-01-01 RX ADMIN — FOLIC ACID 1 MG: 1 TABLET ORAL at 08:11

## 2019-01-01 RX ADMIN — OXYCODONE HYDROCHLORIDE 5 MG: 5 TABLET ORAL at 01:12

## 2019-01-01 RX ADMIN — EPINEPHRINE 0.01 MCG/KG/MIN: 1 INJECTION INTRAMUSCULAR; INTRAVENOUS; SUBCUTANEOUS at 09:12

## 2019-01-01 RX ADMIN — CIPROFLOXACIN HYDROCHLORIDE 250 MG: 250 TABLET, FILM COATED ORAL at 08:12

## 2019-01-01 RX ADMIN — TRAMADOL HYDROCHLORIDE 50 MG: 50 TABLET, FILM COATED ORAL at 08:11

## 2019-01-01 RX ADMIN — CALCIUM CHLORIDE 1 G: 100 INJECTION, SOLUTION INTRAVENOUS at 05:12

## 2019-01-01 RX ADMIN — ALBUMIN (HUMAN) 25 G: 25 SOLUTION INTRAVENOUS at 09:11

## 2019-01-01 RX ADMIN — POLYETHYLENE GLYCOL 3350 17 G: 17 POWDER, FOR SOLUTION ORAL at 01:11

## 2019-01-01 RX ADMIN — CEFTRIAXONE SODIUM 1 G: 1 INJECTION, POWDER, FOR SOLUTION INTRAMUSCULAR; INTRAVENOUS at 08:12

## 2019-01-01 RX ADMIN — SODIUM BICARBONATE 100 MEQ: 84 INJECTION, SOLUTION INTRAVENOUS at 03:12

## 2019-01-01 RX ADMIN — FOLIC ACID 1 MG: 1 TABLET ORAL at 08:12

## 2019-01-01 RX ADMIN — LIDOCAINE HYDROCHLORIDE 1 ML: 10 INJECTION INFILTRATION; PERINEURAL at 02:12

## 2019-01-01 RX ADMIN — CEFTRIAXONE SODIUM 1 G: 1 INJECTION, POWDER, FOR SOLUTION INTRAMUSCULAR; INTRAVENOUS at 09:12

## 2019-01-01 RX ADMIN — ONDANSETRON HYDROCHLORIDE 8 MG: 4 TABLET, FILM COATED ORAL at 10:11

## 2019-01-01 RX ADMIN — MIDODRINE HYDROCHLORIDE 15 MG: 5 TABLET ORAL at 02:12

## 2019-01-01 RX ADMIN — SEVELAMER CARBONATE 1600 MG: 800 TABLET, FILM COATED ORAL at 05:12

## 2019-01-01 RX ADMIN — MIDODRINE HYDROCHLORIDE 15 MG: 5 TABLET ORAL at 02:11

## 2019-01-01 RX ADMIN — FOLIC ACID 1 MG: 1 TABLET ORAL at 09:11

## 2019-01-01 RX ADMIN — MAGNESIUM HYDROXIDE 2400 MG: 400 SUSPENSION ORAL at 02:11

## 2019-01-01 RX ADMIN — NOREPINEPHRINE BITARTRATE 2.5 MCG/KG/MIN: 1 INJECTION, SOLUTION, CONCENTRATE INTRAVENOUS at 01:12

## 2019-01-01 RX ADMIN — Medication 100 MG: at 08:12

## 2019-01-01 RX ADMIN — POLYETHYLENE GLYCOL 3350 17 G: 17 POWDER, FOR SOLUTION ORAL at 10:11

## 2019-01-01 RX ADMIN — ALBUMIN (HUMAN) 25 G: 25 SOLUTION INTRAVENOUS at 10:11

## 2019-01-01 RX ADMIN — SEVELAMER CARBONATE 800 MG: 800 TABLET, FILM COATED ORAL at 04:11

## 2019-01-01 RX ADMIN — TRAMADOL HYDROCHLORIDE 50 MG: 50 TABLET, FILM COATED ORAL at 05:11

## 2019-01-01 RX ADMIN — Medication 0.16 MCG/KG/MIN: at 09:12

## 2019-01-01 RX ADMIN — TRAMADOL HYDROCHLORIDE 50 MG: 50 TABLET, FILM COATED ORAL at 11:11

## 2019-01-01 RX ADMIN — MICAFUNGIN SODIUM 100 MG: 20 INJECTION, POWDER, LYOPHILIZED, FOR SOLUTION INTRAVENOUS at 09:12

## 2019-01-01 RX ADMIN — Medication 0.28 MCG/KG/MIN: at 02:12

## 2019-01-01 RX ADMIN — CALCIUM CARBONATE (ANTACID) CHEW TAB 500 MG 500 MG: 500 CHEW TAB at 09:12

## 2019-01-01 RX ADMIN — OCTREOTIDE ACETATE 100 MCG: 100 INJECTION, SOLUTION INTRAVENOUS; SUBCUTANEOUS at 05:12

## 2019-01-01 RX ADMIN — ROCURONIUM BROMIDE 50 MG: 10 INJECTION, SOLUTION INTRAVENOUS at 11:12

## 2019-01-01 RX ADMIN — SEVELAMER CARBONATE 800 MG: 800 TABLET, FILM COATED ORAL at 08:12

## 2019-01-01 RX ADMIN — FOLIC ACID 1 MG: 1 TABLET ORAL at 11:12

## 2019-01-01 RX ADMIN — ALBUMIN (HUMAN) 25 G: 25 SOLUTION INTRAVENOUS at 02:12

## 2019-01-01 RX ADMIN — SENNOSIDES AND DOCUSATE SODIUM 1 TABLET: 8.6; 5 TABLET ORAL at 08:11

## 2019-01-01 RX ADMIN — PNEUMOCOCCAL 13-VALENT CONJUGATE VACCINE 0.5 ML: 2.2; 2.2; 2.2; 2.2; 2.2; 4.4; 2.2; 2.2; 2.2; 2.2; 2.2; 2.2; 2.2 INJECTION, SUSPENSION INTRAMUSCULAR at 11:12

## 2019-01-01 RX ADMIN — MIDODRINE HYDROCHLORIDE 10 MG: 5 TABLET ORAL at 03:11

## 2019-01-01 RX ADMIN — ALBUMIN (HUMAN) 25 G: 25 SOLUTION INTRAVENOUS at 08:12

## 2019-01-01 RX ADMIN — PROMETHAZINE HYDROCHLORIDE 12.5 MG: 25 INJECTION INTRAMUSCULAR; INTRAVENOUS at 04:12

## 2019-01-01 RX ADMIN — Medication 0.38 MCG/KG/MIN: at 07:12

## 2019-01-01 RX ADMIN — FENTANYL CITRATE 50 MCG: 50 INJECTION INTRAMUSCULAR; INTRAVENOUS at 10:12

## 2019-01-01 RX ADMIN — OCTREOTIDE ACETATE 50 MCG/HR: 1000 INJECTION, SOLUTION INTRAVENOUS; SUBCUTANEOUS at 07:12

## 2019-01-01 RX ADMIN — ALBUMIN HUMAN 25 G: 50 SOLUTION INTRAVENOUS at 11:12

## 2019-01-01 RX ADMIN — OXYCODONE HYDROCHLORIDE 5 MG: 5 TABLET ORAL at 07:12

## 2019-01-01 RX ADMIN — Medication 100 MG: at 09:12

## 2019-01-01 RX ADMIN — FLUCONAZOLE 200 MG: 100 TABLET ORAL at 09:11

## 2019-01-01 RX ADMIN — POLYETHYLENE GLYCOL 3350 17 G: 17 POWDER, FOR SOLUTION ORAL at 11:12

## 2019-01-01 RX ADMIN — VASOPRESSIN 0.04 UNITS/MIN: 20 INJECTION INTRAVENOUS at 07:12

## 2019-01-01 RX ADMIN — Medication 0.3 MCG/KG/MIN: at 01:12

## 2019-01-01 RX ADMIN — ALBUMIN (HUMAN) 25 G: 25 SOLUTION INTRAVENOUS at 06:11

## 2019-01-01 RX ADMIN — ONDANSETRON HYDROCHLORIDE 4 MG: 4 TABLET, FILM COATED ORAL at 11:11

## 2019-01-01 RX ADMIN — PROMETHAZINE HYDROCHLORIDE 12.5 MG: 25 INJECTION INTRAMUSCULAR; INTRAVENOUS at 09:12

## 2019-01-01 RX ADMIN — ALBUMIN (HUMAN) 12.5 G: 12.5 SOLUTION INTRAVENOUS at 03:12

## 2019-01-01 RX ADMIN — TRAMADOL HYDROCHLORIDE 50 MG: 50 TABLET, FILM COATED ORAL at 06:11

## 2019-01-01 RX ADMIN — ONDANSETRON HYDROCHLORIDE 8 MG: 4 TABLET, FILM COATED ORAL at 09:11

## 2019-01-01 RX ADMIN — MIDODRINE HYDROCHLORIDE 15 MG: 5 TABLET ORAL at 10:11

## 2019-01-01 RX ADMIN — MIDAZOLAM HYDROCHLORIDE 2 MG: 1 INJECTION INTRAMUSCULAR; INTRAVENOUS at 12:12

## 2019-01-01 RX ADMIN — MIDODRINE HYDROCHLORIDE 10 MG: 5 TABLET ORAL at 10:11

## 2019-01-01 RX ADMIN — ALBUMIN (HUMAN) 12.5 G: 12.5 SOLUTION INTRAVENOUS at 11:12

## 2019-01-01 RX ADMIN — SENNOSIDES AND DOCUSATE SODIUM 1 TABLET: 8.6; 5 TABLET ORAL at 02:12

## 2019-01-01 RX ADMIN — MIDODRINE HYDROCHLORIDE 15 MG: 5 TABLET ORAL at 04:12

## 2019-01-01 RX ADMIN — SEVELAMER CARBONATE 1600 MG: 800 TABLET, FILM COATED ORAL at 08:12

## 2019-01-01 RX ADMIN — SEVELAMER CARBONATE 800 MG: 800 TABLET, FILM COATED ORAL at 05:11

## 2019-01-01 RX ADMIN — Medication 0.04 MCG/KG/MIN: at 05:12

## 2019-01-01 RX ADMIN — SEVELAMER CARBONATE 800 MG: 800 TABLET, FILM COATED ORAL at 11:11

## 2019-01-01 RX ADMIN — MIDODRINE HYDROCHLORIDE 15 MG: 5 TABLET ORAL at 05:12

## 2019-01-01 RX ADMIN — SODIUM BICARBONATE: 84 INJECTION, SOLUTION INTRAVENOUS at 05:12

## 2019-01-01 RX ADMIN — OCTREOTIDE ACETATE 100 MCG: 100 INJECTION, SOLUTION INTRAVENOUS; SUBCUTANEOUS at 09:12

## 2019-01-01 RX ADMIN — SEVELAMER CARBONATE 1600 MG: 800 TABLET, FILM COATED ORAL at 06:12

## 2019-01-01 RX ADMIN — Medication 100 MG: at 08:11

## 2019-01-01 RX ADMIN — ALBUMIN (HUMAN) 25 G: 25 SOLUTION INTRAVENOUS at 01:11

## 2019-01-01 RX ADMIN — Medication 0.32 MCG/KG/MIN: at 09:12

## 2019-01-01 RX ADMIN — SEVELAMER CARBONATE 800 MG: 800 TABLET, FILM COATED ORAL at 08:11

## 2019-01-01 RX ADMIN — PROPOFOL 15 MCG/KG/MIN: 10 INJECTION, EMULSION INTRAVENOUS at 08:12

## 2019-01-01 RX ADMIN — CALCIUM CARBONATE (ANTACID) CHEW TAB 500 MG 500 MG: 500 CHEW TAB at 11:12

## 2019-01-01 RX ADMIN — ONDANSETRON HYDROCHLORIDE 8 MG: 4 TABLET, FILM COATED ORAL at 03:12

## 2019-01-01 RX ADMIN — FENTANYL CITRATE 50 MCG: 50 INJECTION, SOLUTION INTRAMUSCULAR; INTRAVENOUS at 10:12

## 2019-01-01 RX ADMIN — SODIUM CHLORIDE: 0.9 INJECTION, SOLUTION INTRAVENOUS at 09:12

## 2019-01-01 RX ADMIN — ALBUMIN (HUMAN) 50 G: 25 SOLUTION INTRAVENOUS at 10:12

## 2019-01-01 RX ADMIN — LIDOCAINE HYDROCHLORIDE 1 ML: 10 INJECTION, SOLUTION INFILTRATION; PERINEURAL at 02:12

## 2019-01-01 RX ADMIN — CLOSTRIDIUM TETANI TOXOID ANTIGEN (FORMALDEHYDE INACTIVATED), CORYNEBACTERIUM DIPHTHERIAE TOXOID ANTIGEN (FORMALDEHYDE INACTIVATED), BORDETELLA PERTUSSIS TOXOID ANTIGEN (GLUTARALDEHYDE INACTIVATED), BORDETELLA PERTUSSIS FILAMENTOUS HEMAGGLUTININ ANTIGEN (FORMALDEHYDE INACTIVATED), BORDETELLA PERTUSSIS PERTACTIN ANTIGEN, AND BORDETELLA PERTUSSIS FIMBRIAE 2/3 ANTIGEN 0.5 ML: 5; 2; 2.5; 5; 3; 5 INJECTION, SUSPENSION INTRAMUSCULAR at 11:12

## 2019-01-01 RX ADMIN — HEPARIN SODIUM 5000 UNITS: 5000 INJECTION, SOLUTION INTRAVENOUS; SUBCUTANEOUS at 06:11

## 2019-01-01 RX ADMIN — SODIUM CHLORIDE, SODIUM LACTATE, POTASSIUM CHLORIDE, AND CALCIUM CHLORIDE 2000 ML: .6; .31; .03; .02 INJECTION, SOLUTION INTRAVENOUS at 06:12

## 2019-01-01 RX ADMIN — OCTREOTIDE ACETATE 100 MCG: 100 INJECTION, SOLUTION INTRAVENOUS; SUBCUTANEOUS at 06:11

## 2019-01-01 RX ADMIN — Medication 0.24 MCG/KG/MIN: at 03:12

## 2019-01-01 RX ADMIN — PIPERACILLIN SODIUM AND TAZOBACTAM SODIUM 4.5 G: 4; .5 INJECTION, POWDER, LYOPHILIZED, FOR SOLUTION INTRAVENOUS at 01:11

## 2019-01-01 RX ADMIN — CIPROFLOXACIN HYDROCHLORIDE 250 MG: 250 TABLET, FILM COATED ORAL at 11:12

## 2019-01-01 RX ADMIN — FLUCONAZOLE 100 MG: 100 TABLET ORAL at 08:11

## 2019-01-01 RX ADMIN — VASOPRESSIN 0.04 UNITS/MIN: 20 INJECTION INTRAVENOUS at 11:12

## 2019-01-01 RX ADMIN — NOREPINEPHRINE BITARTRATE 2.26 MCG/KG/MIN: 1 INJECTION, SOLUTION, CONCENTRATE INTRAVENOUS at 03:12

## 2019-11-20 NOTE — TELEPHONE ENCOUNTER
Attempt to contact patient no answer, left message      ----- Message from Rajeev Marvin sent at 11/20/2019 11:58 AM CST -----  ..Type:  Patient Returning Call    Who Called: Mallika Nettles ( [facility]   Who Left Message for Patient:  Does the patient know what this is regarding?:  Would the patient rather a call back or a response via [facility]? Call back   Best Call Back Number:[phone]  Additional Information: returning a call

## 2019-11-25 PROBLEM — K65.2 SPONTANEOUS BACTERIAL PERITONITIS: Status: ACTIVE | Noted: 2019-01-01

## 2019-11-25 NOTE — PLAN OF CARE
"[facility] Transfer Acceptance Note    [place] [facility] [place] -  Please call extension [phone] (if nobody answers, this will flip to a beeper, so put in your call back number) upon patient arrival to floor for Hospital Medicine admit team assignment and for additional admit orders for the patient.  Do not page the attending, staff physician associate with the patient on arrival (may not be in-house at the time of arrival).  Rather, always call [phone] to reach the triage physician for orders and team assignment.     Transferring Facility/Hospital:  [facility]     Referring Provider/Specialty giving report: Dr. Darby [facility]    Accepting Physician for admission to hospital: Tom Hadley MD    Date of acceptance:  11/25/2019   6:07 PM    Patients name: Luis Hurd     Allergies:Review of patient's allergies indicates:  No Known Allergies     Reason for transfer:  hepatology eval,      Overview/ Report from Physician/Mid-Level Provider:    GI - Dr. Pruett   HPI:  33 Y/O hx of alcohol abuse, alcoholic cirrhosis decompensated with ascites,  morbid obesity and concern for hepatorenal syndrome. Referred for transfer for hepatology evaluation.     Patient admitted 11/20 after presenting with worsening abdominal pain and distension with increased LE edema, pt diagnosed with SBP.  He had quit drinking 2-3 weeks PTA, made decision to stop drinking due to overall malaise and "alcohol wasn't making him feel good anymore" No report that patient had alcohol withdrawal.   Since stopping, he has had progressive abdominal distension, with  persistent nausea/vomiting and 2 days prior to presentation severe abdominal pain.     He was admitted with acute pancreatitis lipase 700 and also had paracentesis diagnosed with SBP, patient was not encephalopathic on admission and remains w/o encephalopathy but overall labs and clinical condition that referring MD feels that patient is looking worse " daily.  US shows pt with cirrhotic liver, portal HTN and ascites, GI consults following and patient started on pentoxyfiline as empiric treatment if underlying acute alcoholic hepatitis was accelerating pts condition.     His Admit Cr was 0.7, the paracentesis pt had 6L was removed with post-procedure albumin but he has had progressive LORENE.  No report of acute hypotension or a shock picture of any kind on admit. Patient has been initiated on HRS treatment, has been receiving albumin daily, is on SQ octreotide and midodrine     Patient diagnosed with SBP and started on ceftriaxone, blood cultures negative on admission, no repeat cultures done, no reports of fevers.  Infectious Disease consultation evaluated patient and recently patient escalated to meropenem.         Home Meds - furosemide (LASIX) 40 mg tablet, Take 1 tablet by mouth daily.  spironolactone (ALDACTONE) 25 mg tablet, Take 1 tablet by mouth daily.    Meds - IP apap, pRN, Albumin , meropenem ,folic acid, lactulose, midodrine, mvai, octreotide, oxycodone, Zofran, pentoxifylline, promethaxine,     VS: Temp:  [97.6 °F (36.4 °C)]   Pulse:  [105]   Resp:  [20]   BP: (119)/(64)   SpO2:  [99 %]     Labs: - See Care Everywhere -[facility] for all notes/labs/diagnostics.     CBC   Wbc 27  hgb 11/34   plt 177   N 91%   mcv 110        ESR 26     CMP - Na 131  K 4  Cl 95  HCo3 26  Bun 47  Cr 2.44   Glu 122  Mike 8.0     Alb 2.8  T. Bili 9.7  D. Bili 6  Ast 135  Alk phos 159     Uric acid 8.3      Ascitic fluid results in care everywhere     Radiographs:  see care everywhere    ECHO  Normal left ventricle cavity size. Normal (55-65%) ejection fraction.  · No significant valve disease.  · Insufficient jet to accurately estimate PA systolic pressure.    CT Abdomen pelvis with IV contrast 11/21    1. Changes of pancreatitis. Several areas of subtle hypodensity within the pancreatic head, and particularly the tail, may represent areas of  liquefaction or early pseudocyst formation.    2. Cirrhotic appearing liver with fatty infiltrative change.    3. Varices, as above. Splenomegaly. Flow seen through the portal vein.    4. Diffuse elongated, but nondilated, gallbladder. No definite stones, wall thickening or biliary dilation.    5. Moderate to moderate large volume ascites. Mild-to-moderate subcutaneous edema.    6. Mild bilateral gynecomastia.    To Do List upon arrival:    1. Hepatology consultation    >can continue pentoxifylline or await hepatology consult   >continue albumin infusions to try to normalize albumin 1gm/kg div into 2-3 doses    >Continue Lactulose - Add Rifaximin     2. Nephrology Consultation    >Continue Current hepatorenal treatment     3. If patient is not a transplant candidate - palliative consultation, consider transfer back to facility if not stable for discharge.     5. Given hx timing, possible that patient developed acute pancreatitis after stopped drinking which has progressed to necrotizing pancreatits coupled with SBP decompensated cirrhotic state.  Discuss with hepatology if non-cont CT vs MRI may aid diagosis     6. Repeat Blood Cultures, procalcitonin, CXR UA Urine culture    Patient is now 6 days s/p SBP treatment - likely needs reepat paracentesis therapeutic vs diagnostic to eval for improvement in ascitic fluid studies.     7. PT/OT Nutrition consultations               Tom Hadley M.D.  Attending Physician  [facility] Medicine Dept.  Pager: [phone]

## 2019-11-26 PROBLEM — A41.9 SEVERE SEPSIS WITH ACUTE ORGAN DYSFUNCTION: Status: ACTIVE | Noted: 2019-01-01

## 2019-11-26 PROBLEM — K70.31 ALCOHOLIC CIRRHOSIS OF LIVER WITH ASCITES: Status: ACTIVE | Noted: 2019-01-01

## 2019-11-26 PROBLEM — E87.1 HYPONATREMIA: Status: ACTIVE | Noted: 2019-01-01

## 2019-11-26 PROBLEM — E66.9 OBESITY (BMI 30-39.9): Status: ACTIVE | Noted: 2019-01-01

## 2019-11-26 PROBLEM — K74.60 DECOMPENSATED HEPATIC CIRRHOSIS: Status: ACTIVE | Noted: 2019-01-01

## 2019-11-26 PROBLEM — D53.9 MACROCYTIC ANEMIA: Status: ACTIVE | Noted: 2019-01-01

## 2019-11-26 PROBLEM — R65.20 SEVERE SEPSIS WITH ACUTE ORGAN DYSFUNCTION: Status: ACTIVE | Noted: 2019-01-01

## 2019-11-26 PROBLEM — Z01.818 ENCOUNTER FOR PRE-TRANSPLANT EVALUATION FOR LIVER TRANSPLANT: Status: ACTIVE | Noted: 2019-01-01

## 2019-11-26 PROBLEM — D63.8 ANEMIA OF CHRONIC DISEASE: Status: ACTIVE | Noted: 2019-01-01

## 2019-11-26 PROBLEM — E43 SEVERE PROTEIN-CALORIE MALNUTRITION: Status: ACTIVE | Noted: 2019-01-01

## 2019-11-26 PROBLEM — D68.9 COAGULOPATHY: Status: ACTIVE | Noted: 2019-01-01

## 2019-11-26 PROBLEM — K76.7 HEPATORENAL SYNDROME: Status: ACTIVE | Noted: 2019-01-01

## 2019-11-26 PROBLEM — K85.90 ACUTE PANCREATITIS: Status: ACTIVE | Noted: 2019-01-01

## 2019-11-26 PROBLEM — N17.9 AKI (ACUTE KIDNEY INJURY): Status: ACTIVE | Noted: 2019-01-01

## 2019-11-26 PROBLEM — F10.20 SEVERE ALCOHOL DEPENDENCE: Status: ACTIVE | Noted: 2019-01-01

## 2019-11-26 PROBLEM — K70.11 ALCOHOLIC HEPATITIS WITH ASCITES: Status: ACTIVE | Noted: 2019-01-01

## 2019-11-26 PROBLEM — K72.90 DECOMPENSATED HEPATIC CIRRHOSIS: Status: ACTIVE | Noted: 2019-01-01

## 2019-11-26 NOTE — PROGRESS NOTES
Progress Note  Hospital Medicine  [facility]         Patient Name: Luis Hurd  MRN:  [id number]  Hospital Medicine Team: [facility] MED L Mya Subramanian MD  Date of Admission:  11/25/2019     Length of Stay:  LOS: 1 day   Expected Discharge Date: 11/30/2019  Principal Problem:  Spontaneous bacterial peritonitis     Subjective:     Interval History/Overnight Events:    Patient admitted overnight with SBP, Tomas and decompensated liver disease.  Patient seen on rounds today.  He is doing well with no acute problems. Denies any abdominal pain or nausea vomiting.  Patient's mental status is at baseline with no concern for hepatic  encephalopathy in the past,   Currently off of lactulose.  Actually currently has some constipation.     Patient underwent 4L paracentesis 11/26/2019, fluid analysis continues to show evidence of SBP with 850  WBCs in 49% segmented neutrophils.  WBC count in the periphery decreasing to 26 from 35.   Creatinine 3.4 from 3.1 today.  Patient has getting albumin and octreotide, no midodrine due to  Good blood pressures.    Obtaining MRCP today for further evaluation of   Abnormal liver enzymes, recent history of pancreatitis, concern for pancreatic pseudocyst/  Abscess,  As well as possibly micro perforation.   Unable to obtain CT abdomen with contrast due to Tomas I.     albumin human 25%  25 g Intravenous Q6H    folic acid  1 mg Oral Daily    octreotide  100 mcg Subcutaneous Q8H    phytonadione ((AQUA-MEPHYTON) IVPB  10 mg Intravenous Daily    piperacillin-tazobactam (ZOSYN) IVPB  4.5 g Intravenous Q8H    polyethylene glycol  17 g Oral Daily    thiamine  100 mg Oral Daily           acetaminophen, Dextrose 10% Bolus, Dextrose 10% Bolus, glucagon (human recombinant), glucose, glucose, ondansetron, promethazine (PHENERGAN) IVPB, sodium chloride 0.9%    Review of Systems   Constitutional: Negative for chills, fatigue, fever.   HENT: Negative for sore throat,  trouble swallowing.    Eyes: Negative for photophobia, visual disturbance.   Respiratory: Negative for cough, shortness of breath.    Cardiovascular: Negative for chest pain, palpitations, leg swelling.   Gastrointestinal: Negative for abdominal pain, constipation, diarrhea, nausea, vomiting.   Endocrine: Negative for cold intolerance, heat intolerance.   Genitourinary: Negative for dysuria, frequency.   Musculoskeletal: Negative for arthralgias, myalgias.   Skin: Negative for rash, wound, erythema   Neurological: Negative for dizziness, syncope, weakness, light-headedness.   Psychiatric/Behavioral: Negative for confusion, hallucinations, anxiety  All other systems reviewed and are negative.    Objective:     Temp:  [97 °F (36.1 °C)-98 °F (36.7 °C)]   Pulse:  [103-106]   Resp:  [18-20]   BP: (127-139)/(64-79)   SpO2:  [95 %-97 %]       Physical Exam:  Constitutional: appears older than stated age, chronically ill looking,  non-distressed, not diaphoretic.   HENT: NC/AT, external ears normal, oropharynx clear, MMM w/o exudates.   Eyes: PERRL, EOMI, conjunctiva normal, no discharge b/l, +scleral icterus   Neck: normal ROM, supple  CV: RRR, no m/r/g, no carotid bruits, +2 peripheral pulses.  Pulmonary/Chest wall: Breathing comfortably w/o distress, CTAB, no w/r/r, no crackles.    GI: Soft, non-tender, (+) BS, (+) BM   +distended,  Caput medusa present,  Obese abdomen  Musculoskeletal: Normal ROM, no atrophy,  +trace pitting edema in LE bilaterally   Neurological: AAO x 4, CN II-XI in tact, nl sensation, nl strength/tone  No asterixis   Skin: warm, dry   +pallor, jaundice, + spider angiomas, +bruising   Psych: normal mood and affect, normal behavior, thought content and judgement.    Labs:    Chemistries:   Recent Labs   Lab 11/25/19 2207 11/26/19  1005   * 132*   K 4.3 4.2   CL 94* 96   CO2 24 26   BUN 52* 58*   CREATININE 3.1* 3.4*   CALCIUM 8.4* 8.1*   PROT 6.0 5.2*   BILITOT 10.2* 8.9*   ALKPHOS 200* 197*    ALT 52* 47*   * 138*   MG  --  2.5   PHOS  --  4.9*        WBC:   Recent Labs   Lab 11/25/19 2207 11/26/19  1005   WBC 35.36* 25.97*     Bands:     CBC/Anemia Labs: Coags:    Recent Labs   Lab 11/25/19 2207 11/26/19  1005   WBC 35.36* 25.97*   HGB 11.4* 10.1*   HCT 34.5* 30.3*    155   * 112*   RDW 14.0 13.9   IRON  --  36*   FERRITIN  --  2,782*   FOLATE  --  7.6   [id number]  --  >2000*    Recent Labs   Lab 11/25/19 2207 11/26/19  1005   INR 1.7* 2.2*        Diagnostic Results:     Ref. Range 11/26/2019 07:45   Fluid Color Unknown Yellow   Fluid Appearance Unknown Cloudy   Body Fluid Type Unknown Ascites   WBC, Body Fluid Latest Units: /cu mm 855   Segs, Fluid Latest Units: % 49   Lymphs, Fluid Latest Units: % 13   Monocytes/Macrophages, Fluid Latest Units: % 37   Body Fluid Albumin Latest Ref Range: See text g/dL 1.1   Body Fluid Source, Albumin Unknown Ascites   Body Fluid Source, Total Protein Unknown Ascites   Body Fluid, Protein Latest Ref Range: Not established g/dL 2.1       Assessment and Plan     Hospital Course:    Mr. Luis Hurd was admitted to Hospital Medicine for management of SBP , ARF, and  Decompensated cirrhosis/ liver failure     Active Hospital Problems    Diagnosis  POA    *Spontaneous bacterial peritonitis [K65.2]  Yes    Hepatorenal syndrome [K76.7]  Yes    Decompensated hepatic cirrhosis [K72.90]  Yes    Alcoholic cirrhosis of liver with ascites [K70.31]  Yes    Coagulopathy [D68.9]  Yes    Severe sepsis with acute organ dysfunction [A41.9, R65.20]  Yes    Anemia of chronic disease [D63.8]  Yes    Hyponatremia [E87.1]  Yes    Alcoholic hepatitis with ascites [K70.11]  Yes    Severe alcohol dependence [F10.20]  Yes    Acute pancreatitis [K85.90]  Yes    LORENE (acute kidney injury) [N17.9]  Yes      Resolved Hospital Problems   No resolved problems to display.       Severe sepsis with organ dysfunction  SBP  - patient diagnosed on 11/21 and started on  "rocephin then switched to meropenem at OSH  - on 11/25, would be Day 5 of treatment, Zosyn  Started on presentation at [facility] on 11/25/2019   -  Patient underwent 4L paracentesis 11/26/2019, fluid analysis continues to show evidence of SBP with 850  WBCs in 49% segmented neutrophils.  WBC count in the periphery decreasing to 26 from 35.   -  Continue Zosyn.  Follow-up ascitic cultures  -  Follow-up blood cultures  -  Continue albumin  -  Obtaining MRCP on 11/26/2019 for further  Investigation of possible pancreatic abscess,  Hepatic biliary obstruction, possible  Micro perforation.  Given non resolving SBP     Decompensated alcohol cirrhosis with ascites  Alcohol hepatitis with ascites  Severe alcohol dependence   MELD-Na score: 36 at 11/26/2019 10:05 AM  MELD score: 35 at 11/26/2019 10:05 AM  Calculated from:  Serum Creatinine: 3.4 mg/dL at 11/26/2019 10:05 AM  Serum Sodium: 132 mmol/L at 11/26/2019 10:05 AM  Total Bilirubin: 8.9 mg/dL at 11/26/2019 10:05 AM  INR(ratio): 2.2 at 11/26/2019 10:05 AM  Age: 34 years     - patient's last drink about a month ago  Prior to presentation  - viral studies OSH largely negative  - thiamine, folate   - hepatology consulted  - addiction psych consulted   - PETH pending; urine tox  - U/S liver with doppler shows "Findings consistent with cirrhosis and sequela of portal hypertension including a large volume of ascites, splenomegaly, and collateral vessel formation"   -  Treat SBP as above  -  Treat renal dysfunction as below  - will likely need inpatient liver transplant evaluation; supportive family and good insight into his alcohol abuse     LORENE  Hepatorenal Syndrome  - was being treated for HRS at OSH with rise in Cr with SBP. atient also had IV contrast and had large volume paracentesis and does not appear to have gotten albumin after  -  Creatinine 3.1 on presentation at [facility]  - cont albumin and octreotide   -  Midodrine held as patient is not hypotensive  - pending urine " studies-  Urine has not been collected   - nephrology consulted  - strict I/O      Acute pancreatitis   - due to alcohol   - lipase improved from OSH;  Lipase here 540-->453  - CLD--> advance diet on 11/26  - Obtaining MRCP 11/26 for further evaluation of   Abnormal liver enzymes, recent history of pancreatitis, concern for pancreatic pseudocyst/  Abscess,  As well as possibly micro perforation.   Unable to obtain CT abdomen with contrast due to Tomas       Coagulopathy due to liver disease  -  Likely due to cirrhosis/  Current decompensation, liver failure  -   fibrinogen 262  - vitamin K for 3 days      Hyponatremia  - fluid restrict . Na 132     Macrocytic anemia  Anemia of chronic disease  - hemoglobin 11-10, . Likely due to alcoholism and liver disease  -  Hemolysis labs reviewed, show some evidence of hemolysis  -  iron studies, ferritin and folate-  Showed evidence of anemia of chronic disease  -  Continue folate     Obesity   Severe protein-calorie malnutrition  Body mass index is 36.66 kg/m²  On admission --> Body mass index is 36.66 kg/m².  -  Albumin 2.4, pre-albumin 4. Likely due to alcoholism/  Cirrhosis,  In setting of acute decompensation  -  Advancing diet as tolerated  -  Boost  -  Dietary consult    Diet:  Low Na with fluid restriction   GI PPx:  PO PPI  DVT PPx:  SCDs due to coagulopathy   Goals of Care:  Full     High Risk Conditions:  [facility], SBP    Disposition:    TBD      Patient's note was created using MModal Dictation.  Any errors in syntax may not have been identified and edited on initial review prior to signing this note.    Signing Physician:     Mya Subramanian MD  Department of Hospital Medicine   [facility]  Pager [phone] [place] [phone]  11/26/2019

## 2019-11-26 NOTE — HPI
"Patient is a 34-year-old male with past medical history of alcohol abuse, newly diagnosed alcoholic cirrhosis with ascites, history of morbid obesity, who was admitted to outside hospital in 1 week ago for abdominal pain and lower extremity edema.  Despite initial evaluation consistent with SBP and a pancreatitis, the patient did not improve with IV ceftriaxone or pentoxifylline with worsening leukocytosis and kidney function.  He was transferred to [facility] for further evaluation.    Patient states that he drinks 2-3 bottles of wine every night after coming back from work, and has been doing that for a long time.  He stopped about a month ago as "it was not making him feel good".  Denies being to rehab, or having issues at work related to his alcohol use.  Endorses single DUI in the past.  Denies IV drug use, history of viral hepatitis, or history of liver disease. He was told that his liver enzymes were elevated in the past, but was never fully counseled to stop alcohol.  He was not on any medications prior to this admission.    On evaluation at outside hospital, the patient was hemodynamically stable on evaluation.  A paracentesis was done and was consistent with SBP, with PMNs around 1000.  Lipase was elevated at 700.  A CT scan of the abdomen with contrast on 11/21 was consistent with acute pancreatitis with possible early pseudocyst.  He also had features of cirrhosis with ascites. He was started on pentoxifylline, IV ceftriaxone.  During hospitalization his white count worsened to 27 from 10, creatinine 3.1 from normal, and worsening bilirubin.  He was started on hepatorenal syndrome treatment, however with no improvement.  He was switched to meropenem prior to transfer.      "

## 2019-11-26 NOTE — H&P
Inpatient Radiology Pre-procedure Note    History of Present Illness:  Luis Hurd is a 34 y.o. male who presents for US guided paracentesis.    Admission H&P reviewed.  History reviewed. No pertinent past medical history.  History reviewed. No pertinent surgical history.    Review of Systems:   As documented in primary team H&P    Home Meds:   Prior to Admission medications    Not on File     Scheduled Meds:    albumin human 25%  25 g Intravenous Q6H    folic acid  1 mg Oral Daily    heparin (porcine)  5,000 Units Subcutaneous Q8H    octreotide  100 mcg Subcutaneous Q8H    phytonadione ((AQUA-MEPHYTON) IVPB  10 mg Intravenous Daily    piperacillin-tazobactam (ZOSYN) IVPB  4.5 g Intravenous Q8H    thiamine  100 mg Oral Daily     Continuous Infusions:   PRN Meds:acetaminophen, Dextrose 10% Bolus, Dextrose 10% Bolus, glucagon (human recombinant), glucose, glucose, ondansetron, promethazine (PHENERGAN) IVPB, sodium chloride 0.9%  Anticoagulants/Antiplatelets: no anticoagulation    Allergies: Review of patient's allergies indicates:  No Known Allergies  Sedation Hx: have not been any systemic reactions    Labs:  Recent Labs   Lab 11/25/19 2207   INR 1.7*       Recent Labs   Lab 11/25/19 2207   WBC 35.36*   HGB 11.4*   HCT 34.5*   *         Recent Labs   Lab 11/25/19 2207   *   *   K 4.3   CL 94*   CO2 24   BUN 52*   CREATININE 3.1*   CALCIUM 8.4*   ALT 52*   *   ALBUMIN 2.8*   BILITOT 10.2*         Vitals:  Temp: 97 °F (36.1 °C) (11/26/19 0035)  Pulse: 105 (11/26/19 0035)  Resp: 18 (11/25/19 2050)  BP: 127/68 (11/26/19 0035)  SpO2: 97 % (11/26/19 0035)     Physical Exam:  ASA: N/A  Mallampati: N/A    General: no acute distress  Mental Status: alert and oriented to person, place and time  HEENT: normocephalic, atraumatic  Chest: unlabored breathing  Abdomen: +distended  Extremity: moves all extremities    Plan: US guided paracentesis  Sedation Plan: local anesthetic  only    Alecia Darling MD  Resident  Department of Radiology  Pager: [phone]

## 2019-11-26 NOTE — PROCEDURES
Radiology Post-Procedure Note    Pre Op Diagnosis: Ascites  Post Op Diagnosis: Same    Procedure: Paracentesis    Procedure performed by: Alecia Darling MD    Written Informed Consent Obtained: Yes  Specimen Removed: YES 20 cc removed for laboratory analysis.  Additional fluid removed for patient comfort.  Estimated Blood Loss: Minimal    Findings:   Successful paracentesis with return of clear yellow fluid.     Patient tolerated procedure well.    Alecia Darling MD  Resident  Department of Radiology  Pager: [phone]

## 2019-11-26 NOTE — CONSULTS
"[facility]  Hepatology  Consult Note    Patient Name: Luis Hurd  MRN: [id number]  Admission Date: 11/25/2019  Hospital Length of Stay: 1 days  Attending Provider: Mya Subramanian MD   Primary Care Physician: William Nettles MD  Principal Problem:Spontaneous bacterial peritonitis    Inpatient consult to Hepatology  Consult performed by: Alnozo Christine MD  Consult ordered by: nEder Cervantes MD        Subjective:     Transplant status: No    HPI:  Patient is a 34-year-old male with past medical history of alcohol abuse, newly diagnosed alcoholic cirrhosis with ascites, history of morbid obesity, who was admitted to outside hospital in 1 week ago for abdominal pain and lower extremity edema.  Despite initial evaluation consistent with SBP and a pancreatitis, the patient did not improve with IV ceftriaxone or pentoxifylline with worsening leukocytosis and kidney function.  He was transferred to [facility] for further evaluation.    Patient states that he drinks 2-3 bottles of wine every night after coming back from work, and has been doing that for a long time.  He stopped about a month ago as "it was not making him feel good".  Denies being to rehab, or having issues at work related to his alcohol use.  Endorses single DUI in the past.  Denies IV drug use, history of viral hepatitis, or history of liver disease. He was told that his liver enzymes were elevated in the past, but was never fully counseled to stop alcohol.  He was not on any medications prior to this admission.    On evaluation at outside hospital, the patient was hemodynamically stable on evaluation.  A paracentesis was done and was consistent with SBP, with PMNs around 1000.  Lipase was elevated at 700.  A CT scan of the abdomen with contrast on 11/21 was consistent with acute pancreatitis with possible early pseudocyst.  He also had features of cirrhosis with ascites. He was started on pentoxifylline, IV ceftriaxone. "  During hospitalization his white count worsened to 27 from 10, creatinine 3.1 from normal, and worsening bilirubin.  He was started on hepatorenal syndrome treatment, however with no improvement.  He was switched to meropenem prior to transfer.    Today the patient is feeling well, and endorses overall improvement of his abdominal pain.  Denies fever, chills, nausea, vomiting, change in bowel habits.  He is hemodynamically stable however mildly tachycardic.  Afebrile.  He underwent paracentesis, with consistent with improving SBP.  Of note ascitic fluid amylase was elevated at 2000 at outside hospital.    Review of Systems   Constitutional: Positive for activity change and fatigue. Negative for appetite change and fever.   HENT: Negative for trouble swallowing.    Eyes: Negative for visual disturbance.   Respiratory: Negative for cough and shortness of breath.    Cardiovascular: Negative for chest pain.   Gastrointestinal: Positive for abdominal distention and abdominal pain. Negative for anal bleeding, blood in stool, constipation, diarrhea, nausea, rectal pain and vomiting.   Genitourinary: Negative for flank pain.   Musculoskeletal: Negative for arthralgias and back pain.   Skin: Positive for color change.   Allergic/Immunologic: Positive for immunocompromised state.   Psychiatric/Behavioral: Negative for confusion.       Past Medical History:   Diagnosis Date    Acute pancreatitis 11/26/2019    Alcoholic cirrhosis of liver with ascites 11/26/2019    Severe alcohol dependence 11/26/2019    Spontaneous bacterial peritonitis 11/25/2019       History reviewed. No pertinent surgical history.    Family history of liver disease: No    Review of patient's allergies indicates:  No Known Allergies    Tobacco Use    Smoking status: Not on file   Substance and Sexual Activity    Alcohol use: Not on file    Drug use: Not on file    Sexual activity: Not on file       Medications Prior to Admission   Medication Sig  Dispense Refill Last Dose    furosemide (LASIX) 40 MG tablet Take 40 mg by mouth once daily.       multivitamin (THERAGRAN) per tablet Take 1 tablet by mouth once daily.       spironolactone (ALDACTONE) 25 MG tablet Take 25 mg by mouth once daily.          Objective:     Vital Signs (Most Recent):  Temp: 97.8 °F (36.6 °C) (11/26/19 1140)  Pulse: 106 (11/26/19 1140)  Resp: 20 (11/26/19 1140)  BP: 139/79 (11/26/19 1140)  SpO2: 95 % (11/26/19 1140) Vital Signs (24h Range):  Temp:  [97 °F (36.1 °C)-98 °F (36.7 °C)] 97.8 °F (36.6 °C)  Pulse:  [103-106] 106  Resp:  [18-20] 20  SpO2:  [95 %-99 %] 95 %  BP: (119-139)/(64-79) 139/79     Weight: 129.5 kg (285 lb 7.9 oz) (11/25/19 2322)  Body mass index is 36.66 kg/m².    Physical Exam   Constitutional: He is oriented to person, place, and time. He appears well-nourished. No distress.   HENT:   Head: Normocephalic.   Eyes: Conjunctivae are normal. Scleral icterus is present.   Neck: Normal range of motion. Neck supple.   Cardiovascular: Regular rhythm.   Tachycardia   Pulmonary/Chest: Effort normal and breath sounds normal.   Abdominal: Soft. Bowel sounds are normal. He exhibits distension. He exhibits no mass. There is no tenderness. There is no guarding.   Musculoskeletal: Normal range of motion. He exhibits edema.   Neurological: He is alert and oriented to person, place, and time.   Skin: Skin is warm and dry.   Psychiatric: He has a normal mood and affect.       MELD-Na score: 36 at 11/26/2019 10:05 AM  MELD score: 35 at 11/26/2019 10:05 AM  Calculated from:  Serum Creatinine: 3.4 mg/dL at 11/26/2019 10:05 AM  Serum Sodium: 132 mmol/L at 11/26/2019 10:05 AM  Total Bilirubin: 8.9 mg/dL at 11/26/2019 10:05 AM  INR(ratio): 2.2 at 11/26/2019 10:05 AM  Age: 34 years    Significant Labs:  CBC:   Recent Labs   Lab 11/26/19  1005   WBC 25.97*   RBC 2.70*   HGB 10.1*   HCT 30.3*        BMP:   Recent Labs   Lab 11/26/19  1005   *   *   K 4.2   CL 96   CO2 26    BUN 58*   CREATININE 3.4*   CALCIUM 8.1*     CMP:   Recent Labs   Lab 11/26/19  1005   *   CALCIUM 8.1*   ALBUMIN 2.4*   PROT 5.2*   *   K 4.2   CO2 26   CL 96   BUN 58*   CREATININE 3.4*   ALKPHOS 197*   ALT 47*   *   BILITOT 8.9*     Coagulation:   Recent Labs   Lab 11/26/19  1005   INR 2.2*       Significant Imaging:  Labs: Reviewed  US: Reviewed  CT: Reviewed    Assessment/Plan:     Decompensated hepatic cirrhosis  Patient is a 34-year-old male with history of alcohol abuse in new diagnosis of alcoholic cirrhosis who presented with SBP and pancreatitis, worsening despite treatment with IV antibiotics.  Unclear if the patient has another source of infection, cultures were negative so far, and no other clear source of infection.  Therefore we recommend further imaging to rule out pancreatic infection.  Current meld score is 36.    -decompensated cirrhosis due to SBP and pancreatitis:    -paracentesis with persistent SBP, although PMNs are improving.   -continue IV Zosyn.   -obtain MRCP to evaluate for pancreatic complications   -IV fluids as indicated.  -acute kidney injury:  Suspect due to hepatorenal syndrome, continue albumin, midodrine, and octreotide.  Appreciate Nephrology assistance  -varices screening:  No EGD in the past  -hepatocellular carcinoma screening:  Abdominal imaging with CT abdomen and ultrasound negative.  AFP with a.m. Labs.  -transplant candidacy:  Addiction psych evaluation.  Will determine need to initiate transplant evaluation depending clinical course and addiction psych evaluation        Thank you for your consult. I will follow-up with patient. Please contact us if you have any additional questions.    Rayne Christine MD  Hepatology  [facility]

## 2019-11-26 NOTE — ASSESSMENT & PLAN NOTE
ASSESSMENT:     DIAGNOSES & PROBLEMS:    Alcohol use disorder, severe    STRENGTHS AND LIABILITIES:   Strength: Patient accepts guidance/feedback, Strength: Patient is expressive/articulate., Strength: Patient is intelligent., Strength: Patient is motivated for change., Strength: Patient is physically healthy., Strength: Patient has positive support network., Strength: Patient has reasonable judgment., Liability: Patient has poor health., Liability: Patient lacks coping skills.    MOTIVATION TO PURSUE RECOVERY: high    ACCEPTANCE OF ADDICTION: fair    ABILITY TO ADHERE TO TREATMENT PLAN: moderate    PLAN:     TRANSPLANT SUITABILITY:  · From a psychiatric perspective, this patient presents as:   Modifiably HIGH RISK at this time, due to improving level of insight/judgment into alcohol use (willingness to abstain from EtOH, agreement to enter and complete treatment program, attend AA meetings after completion of said program, and undergo serial PETH testing) as well as strong family support structure.  · Recommend referral to and agreement to complete intensive outpatient program/residential rehabilitation for education on alcohol use, for coping/behavioral skills to reinforce sobriety, as well as to attend AA meetings, s/p completion of program.  · If patient agrees, with family on board, to above recommendations, and is committed to completion and continued monitoring, thereafter, pt's risks may be mitigated.   Outpatient resources provided at bedside.     THERAPEUTIC TECHNIQUES/APPROACHES & CLINICAL REASONING:  · Patient counseled on abstinence from alcohol and substances of abuse (illicit and prescription).  · Additional workup planned to address substance use disorder, in order to guide and refine ongoing management options, includes serial alcohol and drug laboratory testing (e.g. PETH, urine toxicology).  · Relapse prevention and motivational interviewing provided.  · Education provided on 12 step recovery  programs.    PRESCRIPTION DRUG MANAGEMENT:   The risks and benefits of medication were discussed with this patient.   Possible expectable adverse effects of any current or proposed individual psychotropic agents were discussed with this patient.   Counseling was provided on the importance of full compliance with medication regimens.

## 2019-11-26 NOTE — CONSULTS
"[facility]  Psychiatry  Consult Note    Patient Name: Luis Hurd  MRN: [id number]   Code Status: Full Code  Admission Date: 11/25/2019  Hospital Length of Stay: 1 days  Attending Physician: Mya Subramanian MD  Primary Care Provider: William Nettles MD    Current Legal Status: N/A    Patient information was obtained from patient, parent, past medical records and ER records.   Inpatient consult to Psychiatry  Consult performed by: Angelica Miner MD  Consult ordered by: Ender Cervantes MD        Subjective:     Principal Problem:Spontaneous bacterial peritonitis    CHIEF COMPLAINT  Luis Hurd is a 34 y.o. male who is seen today for an initial psychiatric evaluation by the addiction psychiatry consult service.  Luis Hurd presents with the chief complaint of: pre-transplant evaluation      HISTORY OF PRESENT ILLNESS    Per Primary MD:  33 Y/O hx of alcohol abuse, alcoholic cirrhosis decompensated with ascites,  morbid obesity and concern for hepatorenal syndrome. Referred for transfer for hepatology evaluation.      Patient admitted 11/20 after presenting with worsening abdominal pain and distension with increased LE edema, pt diagnosed with SBP.  He had quit drinking 2-3 weeks PTA, made decision to stop drinking due to overall malaise and "alcohol wasn't making him feel good anymore" No report that patient had alcohol withdrawal.   Since stopping, he has had progressive abdominal distension, with  persistent nausea/vomiting and 2 days prior to presentation severe abdominal pain.      He was admitted with acute pancreatitis lipase 700 and also had paracentesis diagnosed with SBP, patient was not encephalopathic on admission and remains w/o encephalopathy but overall labs and clinical condition that referring MD feels that patient is looking worse daily.  US shows pt with cirrhotic liver, portal HTN and ascites, GI consults following and patient started on pentoxyfiline as empiric " "treatment if underlying acute alcoholic hepatitis was accelerating pts condition.      His Admit Cr was 0.7, the paracentesis pt had 6L was removed with post-procedure albumin but he has had progressive LORENE.  No report of acute hypotension or a shock picture of any kind on admit. Patient has been initiated on HRS treatment, has been receiving albumin daily, is on SQ octreotide and midodrine      Patient diagnosed with SBP and started on ceftriaxone, blood cultures negative on admission, no repeat cultures done, no reports of fevers.  Infectious Disease consultation evaluated patient and recently patient escalated to meropenem.       When speaking with patient about alcohol use, states drinking for over 15 years and 2 to 3 wine bottles a day, states no withdrawals when decided to quit on his own.  Has never been to rehab and has had 1 DUI in the past.  Mother at the bedside. Currently patient denies any abdominal pain, with mild nausea.  No evidence of confusion at any point during his admission.      Per Addiction Psych MD:  Patient seen at bedside. Appears well, although visibly jaundiced. He is fatigued from a restless couple of days, but otherwise in good spirits. States that his last drink was about 4 weeks ago. Patient states he stopped spontaneously because alcohol "just wasn't doing it" for him anymore. Clarifies that what once made him feel better now no longer had that effect. He reports a brief period of sobriety last August that lasted for 3 months. He relapsed after he moved from his parents' home and lived alone. He said it wasn't a significant event that caused it, only a gradual increase in drinking. Otherwise, denies any negative effect besides a past DUI. Denies any interference with work, social, or familial relationships. Denies any s/sx of withdrawal. Patient denies ever having been to rehab, although expressed interest when this was discussed. He is motivated to change his lifestyle and cites a " "very strong, supportive network that is aware of his alcohol abuse. Otherwise, patient reports previously being on Wellbutrin for 3-4 months for depression with minimal effectiveness. He otherwise denies depressive or anxious symptoms at this time, reaffirming that, "I just want to live!" Denies any previous or current SI/HI/AVH or plans to relapse.    COLLATERAL  Mother at bedside states that she believes this event has truly changed patient's perspective towards sobriety. Confirms that patient has a strong social support network who will ensure that patient has the greatest chance at rehabilitation and recovery.    SUBSTANCE ABUSE HISTORY  Substance(s) of Choice: Alcohol  Substances Used: Alcohol   History of IVDU?: Denies  Use of Alcohol: Heavy  Average Consumption: 2-3 bottles of wine a day  Last Drink: about 3-4 weeks ago  Use of Medications for Alcohol/Opioid Use Disorder: Denies  History of Complicated Withdrawal: Denies  History of Detox: Denies  Rehab History: Denies  AA/NA involvement: Denies  Tobacco: Denies  Spouse/Partner Consumption: n/a  Patient Aware of Biomedical Complications: Yes    DSM-5 SUBSTANCE USE DISORDER CRITERIA   Mild (1-3), Moderate (4-5), Severe (?6)  1. Often take in larger amounts or over a longer period of time than was intended.  2. Persistent desire or unsuccessful efforts to cut down or control use.  3. Great deal of time spent in activities necessary to obtain substance, use, or recover from effects.  4. Craving/strong desire for substance or urge to use.  5. Use resulting in failure to fulfill major role obligations at home, work or school.  6. Social, occupational, recreational activities decreased because of use.  7. Continued use despite having persistent or recurrent social or interpersonal problems cause or exaserbated by the substance.  8. Recurrent use in situations in which it is physically hazardous.  9. Use despite physical or psychological problems that are likely to " have been caused or exacerbated by the substance.  10. Tolerance, as defined by either of the following.   A. A need for markedly increased amounts of substance to achieve intoxication or desired effect. -OR-    B. A markedly diminished effect with continued use of the same amount of substance.  11. Withdrawal, as manifested by the following.   A. The characteristic withdrawal syndrome for substance. -AND-   B. Substance is taken to relieve or avoid withdrawal symptoms.    ARE THE CRITERIA MET FOR DSM-5 SUBSTANCE USE DISORDER: Yes, severe      TRANSPLANT EVALUATION  Date first informed of impending organ failure - During this 11/2019 admission  When was the subject of transplantation first broached - During this admission  Pt made the following lifestyle adjustments and how - Quit drinking 3-4 weeks prior to admission  Does the patient accept/understand the connection between substance use and subsequent organ failure - Yes  Date of last use of substances - 3-4 weeks ago  Understands need for lifetime medication - Yes  Understands need for lifetime sobriety - Yes  View of AA/NA - Amenable  Social support - Stong      PSYCHIATRIC HISTORY  Reported Diagnose(s): Depression  Previous Medication Trials: Wellbutrin  Previous Psychiatric Hospitalizations: Denies  Outpatient Psychiatrist?: Denies, interested in following with Addiction Psychiatrist in outpatient setting, preferably in  area      SUICIDE/HOMICIDE RISK ASSESSMENT  Current/active suicidal ideation/plan/intent: Denies  Previous suicide attempts: Denies  Current/active homicidal ideation/plan/intent: Denies      HISTORY OF TRAUMA, ABUSE & VIOLENCE  Trauma: Denies  Physical Abuse: Denies  Sexual Abuse: Denies  Violent Conduct: Denies    Access to Gun: Denies       FAMILY PSYCHIATRIC HISTORY   Denies       SOCIAL HISTORY  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, college degree, lives alone in [place], no children, single      PAST MEDICAL HISTORY   Active Ambulatory Problems      Diagnosis Date Noted    No Active Ambulatory Problems     Resolved Ambulatory Problems     Diagnosis Date Noted    No Resolved Ambulatory Problems     Past Medical History:   Diagnosis Date    Acute pancreatitis 11/26/2019    Alcoholic cirrhosis of liver with ascites 11/26/2019    Severe alcohol dependence 11/26/2019    Spontaneous bacterial peritonitis 11/25/2019       PSYCHOSOCIAL FACTORS  Stressors (Psychosocial and Environmental): health and drug and alcohol.       PSYCHIATRIC ROS  Denies any depressive, anxiety symptoms. Has been feeling tired from lack of sleep in the last couple of days, but otherwise fatigue is secondary to medical condition.    MEDICAL ROS    Complete review of systems performed covering Constitutional, Eyes, ENT/Mouth, Cardiovascular, Respiratory, Gastrointestinal, Genitourinary, Musculoskeletal, Skin, Neurologic, Endocrine, Heme/Lymph, and Allergy/Immune.     Complete review of systems was negative with the exception of the following positive symptoms: fatigue, abdominal distention, abdominal pain, pain in extremities from multiple blood draws, jaundice, nausea/vomiting prior to arrival      ALLERGIES  Patient has no known allergies.      MEDICATIONS    Psychotropics:  None    Infusions:      Scheduled:   albumin human 25%  25 g Intravenous Q6H    folic acid  1 mg Oral Daily    octreotide  100 mcg Subcutaneous Q8H    phytonadione ((AQUA-MEPHYTON) IVPB  10 mg Intravenous Daily    piperacillin-tazobactam (ZOSYN) IVPB  4.5 g Intravenous Q8H    polyethylene glycol  17 g Oral Daily    thiamine  100 mg Oral Daily       PRN:  acetaminophen, Dextrose 10% Bolus, Dextrose 10% Bolus, glucagon (human recombinant), glucose, glucose, ondansetron, promethazine (PHENERGAN) IVPB, sodium chloride 0.9%    Home Medications:  Prior to Admission medications    Medication Sig Start Date End Date Taking? Authorizing Provider   furosemide (LASIX) 40 MG tablet Take 40 mg by mouth once daily.   Yes  Historical Provider, MD   multivitamin (THERAGRAN) per tablet Take 1 tablet by mouth once daily.   Yes Historical Provider, MD   spironolactone (ALDACTONE) 25 MG tablet Take 25 mg by mouth once daily.   Yes Historical Provider, MD       Hospital Course: See HPI.         Patient History           Medical as of 11/26/2019     Past Medical History     Diagnosis Date Comments Source    Acute pancreatitis 11/26/2019 -- Provider    Alcoholic cirrhosis of liver with ascites 11/26/2019 -- Provider    Severe alcohol dependence 11/26/2019 -- Provider    Spontaneous bacterial peritonitis 11/25/2019 -- Provider                  Surgical as of 11/26/2019    Past Surgical History: Patient provided no pertinent surgical history.           Family as of 11/26/2019    None           Tobacco Use as of 11/26/2019     Smoking Status Smoking Start Date Smoking Quit Date Packs/Day Years Used    Never Assessed -- -- -- --    Types Comments Smokeless Tobacco Status Smokeless Tobacco Quit Date Source    -- -- Unknown -- --            Alcohol Use as of 11/26/2019    None           Drug Use as of 11/26/2019    None           Sexual Activity as of 11/26/2019    None           Activities of Daily Living as of 11/26/2019    None           Social Documentation as of 11/26/2019    None           Occupational as of 11/26/2019    None           Socioeconomic as of 11/26/2019     Marital Status Spouse Name Number of Children Years Education Education Level Preferred Language Ethnicity Race Source    Single -- -- -- -- English /White White --    Financial Resource Strain Food Insecurity: Worry Food Insecurity: Inability Transportation Needs: Medical Transportation Needs: Non-medical    -- -- -- -- --            Pertinent History     Question Response Comments    Lives with -- --    Place in Birth Order -- --    Lives in -- --    Number of Siblings -- --    Raised by -- --    Legal Involvement -- --    Childhood Trauma -- --    Criminal History  "of -- --    Financial Status -- --    Highest Level of Education -- --    Does patient have access to a firearm? -- --     Service -- --    Primary Leisure Activity -- --    Spirituality -- --        Past Medical History:   Diagnosis Date    Acute pancreatitis 11/26/2019    Alcoholic cirrhosis of liver with ascites 11/26/2019    Severe alcohol dependence 11/26/2019    Spontaneous bacterial peritonitis 11/25/2019     History reviewed. No pertinent surgical history.  Family History     None        Tobacco Use    Smoking status: Not on file   Substance and Sexual Activity    Alcohol use: Not on file    Drug use: Not on file    Sexual activity: Not on file     Review of patient's allergies indicates:  No Known Allergies    No current facility-administered medications on file prior to encounter.      Current Outpatient Medications on File Prior to Encounter   Medication Sig    furosemide (LASIX) 40 MG tablet Take 40 mg by mouth once daily.    multivitamin (THERAGRAN) per tablet Take 1 tablet by mouth once daily.    spironolactone (ALDACTONE) 25 MG tablet Take 25 mg by mouth once daily.     Psychotherapeutics (From admission, onward)    None        Review of Systems  Strengths and Liabilities: Strength: Patient accepts guidance/feedback, Strength: Patient is expressive/articulate., Strength: Patient is intelligent., Strength: Patient is motivated for change., Strength: Patient has positive support network., Strength: Patient has reasonable judgment., Liability: Patient has poor health.    Objective:     Vital Signs (Most Recent):  Temp: 97.8 °F (36.6 °C) (11/26/19 1140)  Pulse: 106 (11/26/19 1140)  Resp: 20 (11/26/19 1140)  BP: 139/79 (11/26/19 1140)  SpO2: 95 % (11/26/19 1140) Vital Signs (24h Range):  Temp:  [97 °F (36.1 °C)-98 °F (36.7 °C)] 97.8 °F (36.6 °C)  Pulse:  [103-106] 106  Resp:  [18-20] 20  SpO2:  [95 %-97 %] 95 %  BP: (127-139)/(64-79) 139/79     Height: 6' 2" (188 cm)  Weight: 129.5 kg " "(285 lb 7.9 oz)  Body mass index is 36.66 kg/m².      Intake/Output Summary (Last 24 hours) at 11/26/2019 1659  Last data filed at 11/26/2019 1300  Gross per 24 hour   Intake --   Output 75 ml   Net -75 ml       Physical Exam   Psychiatric:   Orientation: awake, alert, and oriented x 3  Speech: normal tone, volume, rate  Language: english, fluent  Mood: "Good"  Affect: Appropriate, mood congruent  Thought Process: linear, goal-oriented  Associations: no loosening  Thought Content: no delusions, paranoia, SI/HI/AVH  Memory: intact, appropriate  Attention and Concentration: appropriate  Fund of Knowledge: intact, estimated to be above average  Insight: limited but improving  Judgment: fair        Significant Labs: All pertinent labs within the past 24 hours have been reviewed.    Significant Imaging: I have reviewed all pertinent imaging results/findings within the past 24 hours.    Assessment/Plan:     Encounter for pre-transplant evaluation for liver transplant  ASSESSMENT:     DIAGNOSES & PROBLEMS:    Alcohol use disorder, severe    STRENGTHS AND LIABILITIES:   Strength: Patient accepts guidance/feedback, Strength: Patient is expressive/articulate., Strength: Patient is intelligent., Strength: Patient is motivated for change., Strength: Patient is physically healthy., Strength: Patient has positive support network., Strength: Patient has reasonable judgment., Liability: Patient has poor health., Liability: Patient lacks coping skills.    MOTIVATION TO PURSUE RECOVERY: high    ACCEPTANCE OF ADDICTION: fair    ABILITY TO ADHERE TO TREATMENT PLAN: moderate    PLAN:     TRANSPLANT SUITABILITY:  · From a psychiatric perspective, this patient presents as:   Modifiably HIGH RISK at this time, due to improving level of insight/judgment into alcohol use (willingness to abstain from EtOH, agreement to enter and complete treatment program, attend AA meetings after completion of said program, and undergo serial PETH testing) " as well as strong family support structure.  · Recommend referral to and agreement to complete intensive outpatient program/residential rehabilitation for education on alcohol use, for coping/behavioral skills to reinforce sobriety, as well as to attend AA meetings, s/p completion of program.  · If patient agrees, with family on board, to above recommendations, and is committed to completion and continued monitoring, thereafter, pt's risks may be mitigated.   Outpatient resources provided at bedside.     THERAPEUTIC TECHNIQUES/APPROACHES & CLINICAL REASONING:  · Patient counseled on abstinence from alcohol and substances of abuse (illicit and prescription).  · Additional workup planned to address substance use disorder, in order to guide and refine ongoing management options, includes serial alcohol and drug laboratory testing (e.g. PETH, urine toxicology).  · Relapse prevention and motivational interviewing provided.  · Education provided on 12 step recovery programs.    PRESCRIPTION DRUG MANAGEMENT:   The risks and benefits of medication were discussed with this patient.   Possible expectable adverse effects of any current or proposed individual psychotropic agents were discussed with this patient.   Counseling was provided on the importance of full compliance with medication regimens.           Total Time:  60 minutes      Case discussed with Attending Psychiatrist, Dr. Terrazas.     Please call/page for any questions/concerns.     Angelica Miner M.D.  [facility] Psych[facility] PGY-2  [facility]  Pager: [phone]

## 2019-11-26 NOTE — SUBJECTIVE & OBJECTIVE
Review of Systems   Constitutional: Positive for activity change and fatigue. Negative for appetite change and fever.   HENT: Negative for trouble swallowing.    Eyes: Negative for visual disturbance.   Respiratory: Negative for cough and shortness of breath.    Cardiovascular: Negative for chest pain.   Gastrointestinal: Positive for abdominal distention and abdominal pain. Negative for anal bleeding, blood in stool, constipation, diarrhea, nausea, rectal pain and vomiting.   Genitourinary: Negative for flank pain.   Musculoskeletal: Negative for arthralgias and back pain.   Skin: Positive for color change.   Allergic/Immunologic: Positive for immunocompromised state.   Psychiatric/Behavioral: Negative for confusion.       Past Medical History:   Diagnosis Date    Acute pancreatitis 11/26/2019    Alcoholic cirrhosis of liver with ascites 11/26/2019    Severe alcohol dependence 11/26/2019    Spontaneous bacterial peritonitis 11/25/2019       History reviewed. No pertinent surgical history.    Family history of liver disease: No    Review of patient's allergies indicates:  No Known Allergies    Tobacco Use    Smoking status: Not on file   Substance and Sexual Activity    Alcohol use: Not on file    Drug use: Not on file    Sexual activity: Not on file       Medications Prior to Admission   Medication Sig Dispense Refill Last Dose    furosemide (LASIX) 40 MG tablet Take 40 mg by mouth once daily.       multivitamin (THERAGRAN) per tablet Take 1 tablet by mouth once daily.       spironolactone (ALDACTONE) 25 MG tablet Take 25 mg by mouth once daily.          Objective:     Vital Signs (Most Recent):  Temp: 97.8 °F (36.6 °C) (11/26/19 1140)  Pulse: 106 (11/26/19 1140)  Resp: 20 (11/26/19 1140)  BP: 139/79 (11/26/19 1140)  SpO2: 95 % (11/26/19 1140) Vital Signs (24h Range):  Temp:  [97 °F (36.1 °C)-98 °F (36.7 °C)] 97.8 °F (36.6 °C)  Pulse:  [103-106] 106  Resp:  [18-20] 20  SpO2:  [95 %-99 %] 95 %  BP:  (119-139)/(64-79) 139/79     Weight: 129.5 kg (285 lb 7.9 oz) (11/25/19 2322)  Body mass index is 36.66 kg/m².    Physical Exam   Constitutional: He is oriented to person, place, and time. He appears well-nourished. No distress.   HENT:   Head: Normocephalic.   Eyes: Conjunctivae are normal. Scleral icterus is present.   Neck: Normal range of motion. Neck supple.   Cardiovascular: Regular rhythm.   Tachycardia   Pulmonary/Chest: Effort normal and breath sounds normal.   Abdominal: Soft. Bowel sounds are normal. He exhibits distension. He exhibits no mass. There is no tenderness. There is no guarding.   Musculoskeletal: Normal range of motion. He exhibits edema.   Neurological: He is alert and oriented to person, place, and time.   Skin: Skin is warm and dry.   Psychiatric: He has a normal mood and affect.       MELD-Na score: 36 at 11/26/2019 10:05 AM  MELD score: 35 at 11/26/2019 10:05 AM  Calculated from:  Serum Creatinine: 3.4 mg/dL at 11/26/2019 10:05 AM  Serum Sodium: 132 mmol/L at 11/26/2019 10:05 AM  Total Bilirubin: 8.9 mg/dL at 11/26/2019 10:05 AM  INR(ratio): 2.2 at 11/26/2019 10:05 AM  Age: 34 years    Significant Labs:  CBC:   Recent Labs   Lab 11/26/19  1005   WBC 25.97*   RBC 2.70*   HGB 10.1*   HCT 30.3*        BMP:   Recent Labs   Lab 11/26/19  1005   *   *   K 4.2   CL 96   CO2 26   BUN 58*   CREATININE 3.4*   CALCIUM 8.1*     CMP:   Recent Labs   Lab 11/26/19  1005   *   CALCIUM 8.1*   ALBUMIN 2.4*   PROT 5.2*   *   K 4.2   CO2 26   CL 96   BUN 58*   CREATININE 3.4*   ALKPHOS 197*   ALT 47*   *   BILITOT 8.9*     Coagulation:   Recent Labs   Lab 11/26/19  1005   INR 2.2*       Significant Imaging:  Labs: Reviewed  US: Reviewed  CT: Reviewed

## 2019-11-26 NOTE — HPI
"CHIEF COMPLAINT  Luis Hurd is a 34 y.o. male who is seen today for an initial psychiatric evaluation by the addiction psychiatry consult service.  Luis Hurd presents with the chief complaint of: pre-transplant evaluation      HISTORY OF PRESENT ILLNESS    Per Primary MD:  33 Y/O hx of alcohol abuse, alcoholic cirrhosis decompensated with ascites,  morbid obesity and concern for hepatorenal syndrome. Referred for transfer for hepatology evaluation.      Patient admitted 11/20 after presenting with worsening abdominal pain and distension with increased LE edema, pt diagnosed with SBP.  He had quit drinking 2-3 weeks PTA, made decision to stop drinking due to overall malaise and "alcohol wasn't making him feel good anymore" No report that patient had alcohol withdrawal.   Since stopping, he has had progressive abdominal distension, with  persistent nausea/vomiting and 2 days prior to presentation severe abdominal pain.      He was admitted with acute pancreatitis lipase 700 and also had paracentesis diagnosed with SBP, patient was not encephalopathic on admission and remains w/o encephalopathy but overall labs and clinical condition that referring MD feels that patient is looking worse daily.  US shows pt with cirrhotic liver, portal HTN and ascites, GI consults following and patient started on pentoxyfiline as empiric treatment if underlying acute alcoholic hepatitis was accelerating pts condition.      His Admit Cr was 0.7, the paracentesis pt had 6L was removed with post-procedure albumin but he has had progressive LORENE.  No report of acute hypotension or a shock picture of any kind on admit. Patient has been initiated on HRS treatment, has been receiving albumin daily, is on SQ octreotide and midodrine      Patient diagnosed with SBP and started on ceftriaxone, blood cultures negative on admission, no repeat cultures done, no reports of fevers.  Infectious Disease consultation evaluated patient and " "recently patient escalated to meropenem.       When speaking with patient about alcohol use, states drinking for over 15 years and 2 to 3 wine bottles a day, states no withdrawals when decided to quit on his own.  Has never been to rehab and has had 1 DUI in the past.  Mother at the bedside. Currently patient denies any abdominal pain, with mild nausea.  No evidence of confusion at any point during his admission.      Per Addiction Psych MD:  Patient seen at bedside. Appears well, although visibly jaundiced. He is fatigued from a restless couple of days, but otherwise in good spirits. States that his last drink was about 4 weeks ago. Patient states he stopped spontaneously because alcohol "just wasn't doing it" for him anymore. Clarifies that what once made him feel better now no longer had that effect. He reports a brief period of sobriety last August that lasted for 3 months. He relapsed after he moved from his parents' home and lived alone. He said it wasn't a significant event that caused it, only a gradual increase in drinking. Otherwise, denies any negative effect besides a past DUI. Denies any interference with work, social, or familial relationships. Denies any s/sx of withdrawal. Patient denies ever having been to rehab, although expressed interest when this was discussed. He is motivated to change his lifestyle and cites a very strong, supportive network that is aware of his alcohol abuse. Otherwise, patient reports previously being on Wellbutrin for 3-4 months for depression with minimal effectiveness. He otherwise denies depressive or anxious symptoms at this time, reaffirming that, "I just want to live!" Denies any previous or current SI/HI/AVH or plans to relapse.    COLLATERAL  Mother at bedside states that she believes this event has truly changed patient's perspective towards sobriety. Confirms that patient has a strong social support network who will ensure that patient has the greatest chance at " rehabilitation and recovery.    SUBSTANCE ABUSE HISTORY  Substance(s) of Choice: Alcohol  Substances Used: Alcohol   History of IVDU?: Denies  Use of Alcohol: Heavy  Average Consumption: 2-3 bottles of wine a day  Last Drink: about 3-4 weeks ago  Use of Medications for Alcohol/Opioid Use Disorder: Denies  History of Complicated Withdrawal: Denies  History of Detox: Denies  Rehab History: Denies  AA/NA involvement: Denies  Tobacco: Denies  Spouse/Partner Consumption: n/a  Patient Aware of Biomedical Complications: Yes    DSM-5 SUBSTANCE USE DISORDER CRITERIA   Mild (1-3), Moderate (4-5), Severe (?6)  1. Often take in larger amounts or over a longer period of time than was intended.  2. Persistent desire or unsuccessful efforts to cut down or control use.  3. Great deal of time spent in activities necessary to obtain substance, use, or recover from effects.  4. Craving/strong desire for substance or urge to use.  5. Use resulting in failure to fulfill major role obligations at home, work or school.  6. Social, occupational, recreational activities decreased because of use.  7. Continued use despite having persistent or recurrent social or interpersonal problems cause or exaserbated by the substance.  8. Recurrent use in situations in which it is physically hazardous.  9. Use despite physical or psychological problems that are likely to have been caused or exacerbated by the substance.  10. Tolerance, as defined by either of the following.   A. A need for markedly increased amounts of substance to achieve intoxication or desired effect. -OR-    B. A markedly diminished effect with continued use of the same amount of substance.  11. Withdrawal, as manifested by the following.   A. The characteristic withdrawal syndrome for substance. -AND-   B. Substance is taken to relieve or avoid withdrawal symptoms.    ARE THE CRITERIA MET FOR DSM-5 SUBSTANCE USE DISORDER: Yes, severe      TRANSPLANT EVALUATION  Date first informed  of impending organ failure - During this 11/2019 admission  When was the subject of transplantation first broached - During this admission  Pt made the following lifestyle adjustments and how - Quit drinking 3-4 weeks prior to admission  Does the patient accept/understand the connection between substance use and subsequent organ failure - Yes  Date of last use of substances - 3-4 weeks ago  Understands need for lifetime medication - Yes  Understands need for lifetime sobriety - Yes  View of AA/NA - Amenable  Social support - Stong      PSYCHIATRIC HISTORY  Reported Diagnose(s): Depression  Previous Medication Trials: Wellbutrin  Previous Psychiatric Hospitalizations: Denies  Outpatient Psychiatrist?: Denies, interested in following with Addiction Psychiatrist in outpatient setting, preferably in  area      SUICIDE/HOMICIDE RISK ASSESSMENT  Current/active suicidal ideation/plan/intent: Denies  Previous suicide attempts: Denies  Current/active homicidal ideation/plan/intent: Denies      HISTORY OF TRAUMA, ABUSE & VIOLENCE  Trauma: Denies  Physical Abuse: Denies  Sexual Abuse: Denies  Violent Conduct: Denies    Access to Gun: Denies       FAMILY PSYCHIATRIC HISTORY   Denies       SOCIAL HISTORY  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, college degree, lives alone in [place], no children, single      PAST MEDICAL HISTORY   Active Ambulatory Problems     Diagnosis Date Noted    No Active Ambulatory Problems     Resolved Ambulatory Problems     Diagnosis Date Noted    No Resolved Ambulatory Problems     Past Medical History:   Diagnosis Date    Acute pancreatitis 11/26/2019    Alcoholic cirrhosis of liver with ascites 11/26/2019    Severe alcohol dependence 11/26/2019    Spontaneous bacterial peritonitis 11/25/2019       PSYCHOSOCIAL FACTORS  Stressors (Psychosocial and Environmental): health and drug and alcohol.       PSYCHIATRIC ROS  Denies any depressive, anxiety symptoms. Has been feeling tired from lack of sleep in the last  couple of days, but otherwise fatigue is secondary to medical condition.    MEDICAL ROS    Complete review of systems performed covering Constitutional, Eyes, ENT/Mouth, Cardiovascular, Respiratory, Gastrointestinal, Genitourinary, Musculoskeletal, Skin, Neurologic, Endocrine, Heme/Lymph, and Allergy/Immune.     Complete review of systems was negative with the exception of the following positive symptoms: fatigue, abdominal distention, abdominal pain, pain in extremities from multiple blood draws, jaundice, nausea/vomiting prior to arrival      ALLERGIES  Patient has no known allergies.      MEDICATIONS    Psychotropics:  None    Infusions:      Scheduled:   albumin human 25%  25 g Intravenous Q6H    folic acid  1 mg Oral Daily    octreotide  100 mcg Subcutaneous Q8H    phytonadione ((AQUA-MEPHYTON) IVPB  10 mg Intravenous Daily    piperacillin-tazobactam (ZOSYN) IVPB  4.5 g Intravenous Q8H    polyethylene glycol  17 g Oral Daily    thiamine  100 mg Oral Daily       PRN:  acetaminophen, Dextrose 10% Bolus, Dextrose 10% Bolus, glucagon (human recombinant), glucose, glucose, ondansetron, promethazine (PHENERGAN) IVPB, sodium chloride 0.9%    Home Medications:  Prior to Admission medications    Medication Sig Start Date End Date Taking? Authorizing Provider   furosemide (LASIX) 40 MG tablet Take 40 mg by mouth once daily.   Yes Historical Provider, MD   multivitamin (THERAGRAN) per tablet Take 1 tablet by mouth once daily.   Yes Historical Provider, MD   spironolactone (ALDACTONE) 25 MG tablet Take 25 mg by mouth once daily.   Yes Historical Provider, MD

## 2019-11-26 NOTE — SUBJECTIVE & OBJECTIVE
Patient History           Medical as of 11/26/2019     Past Medical History     Diagnosis Date Comments Source    Acute pancreatitis 11/26/2019 -- Provider    Alcoholic cirrhosis of liver with ascites 11/26/2019 -- Provider    Severe alcohol dependence 11/26/2019 -- Provider    Spontaneous bacterial peritonitis 11/25/2019 -- Provider                  Surgical as of 11/26/2019    Past Surgical History: Patient provided no pertinent surgical history.           Family as of 11/26/2019    None           Tobacco Use as of 11/26/2019     Smoking Status Smoking Start Date Smoking Quit Date Packs/Day Years Used    Never Assessed -- -- -- --    Types Comments Smokeless Tobacco Status Smokeless Tobacco Quit Date Source    -- -- Unknown -- --            Alcohol Use as of 11/26/2019    None           Drug Use as of 11/26/2019    None           Sexual Activity as of 11/26/2019    None           Activities of Daily Living as of 11/26/2019    None           Social Documentation as of 11/26/2019    None           Occupational as of 11/26/2019    None           Socioeconomic as of 11/26/2019     Marital Status Spouse Name Number of Children Years Education Education Level Preferred Language Ethnicity Race Source    Single -- -- -- -- English /White White --    Financial Resource Strain Food Insecurity: Worry Food Insecurity: Inability Transportation Needs: Medical Transportation Needs: Non-medical    -- -- -- -- --            Pertinent History     Question Response Comments    Lives with -- --    Place in Birth Order -- --    Lives in -- --    Number of Siblings -- --    Raised by -- --    Legal Involvement -- --    Childhood Trauma -- --    Criminal History of -- --    Financial Status -- --    Highest Level of Education -- --    Does patient have access to a firearm? -- --     Service -- --    Primary Leisure Activity -- --    Spirituality -- --        Past Medical History:   Diagnosis Date    Acute  "pancreatitis 11/26/2019    Alcoholic cirrhosis of liver with ascites 11/26/2019    Severe alcohol dependence 11/26/2019    Spontaneous bacterial peritonitis 11/25/2019     History reviewed. No pertinent surgical history.  Family History     None        Tobacco Use    Smoking status: Not on file   Substance and Sexual Activity    Alcohol use: Not on file    Drug use: Not on file    Sexual activity: Not on file     Review of patient's allergies indicates:  No Known Allergies    No current facility-administered medications on file prior to encounter.      Current Outpatient Medications on File Prior to Encounter   Medication Sig    furosemide (LASIX) 40 MG tablet Take 40 mg by mouth once daily.    multivitamin (THERAGRAN) per tablet Take 1 tablet by mouth once daily.    spironolactone (ALDACTONE) 25 MG tablet Take 25 mg by mouth once daily.     Psychotherapeutics (From admission, onward)    None        Review of Systems  Strengths and Liabilities: Strength: Patient accepts guidance/feedback, Strength: Patient is expressive/articulate., Strength: Patient is intelligent., Strength: Patient is motivated for change., Strength: Patient has positive support network., Strength: Patient has reasonable judgment., Liability: Patient has poor health.    Objective:     Vital Signs (Most Recent):  Temp: 97.8 °F (36.6 °C) (11/26/19 1140)  Pulse: 106 (11/26/19 1140)  Resp: 20 (11/26/19 1140)  BP: 139/79 (11/26/19 1140)  SpO2: 95 % (11/26/19 1140) Vital Signs (24h Range):  Temp:  [97 °F (36.1 °C)-98 °F (36.7 °C)] 97.8 °F (36.6 °C)  Pulse:  [103-106] 106  Resp:  [18-20] 20  SpO2:  [95 %-97 %] 95 %  BP: (127-139)/(64-79) 139/79     Height: 6' 2" (188 cm)  Weight: 129.5 kg (285 lb 7.9 oz)  Body mass index is 36.66 kg/m².      Intake/Output Summary (Last 24 hours) at 11/26/2019 0119  Last data filed at 11/26/2019 1300  Gross per 24 hour   Intake --   Output 75 ml   Net -75 ml       Physical Exam   Psychiatric:   Orientation: " "awake, alert, and oriented x 3  Speech: normal tone, volume, rate  Language: english, fluent  Mood: "Good"  Affect: Appropriate, mood congruent  Thought Process: linear, goal-oriented  Associations: no loosening  Thought Content: no delusions, paranoia, SI/HI/AVH  Memory: intact, appropriate  Attention and Concentration: appropriate  Fund of Knowledge: intact, estimated to be above average  Insight: limited but improving  Judgment: fair        Significant Labs: All pertinent labs within the past 24 hours have been reviewed.    Significant Imaging: I have reviewed all pertinent imaging results/findings within the past 24 hours.  "

## 2019-11-26 NOTE — NURSING
Paracentesis complete, 4000 MLs removed. Specimen sent to lab. No Albumin administered per protocol. Dressing applied to abdominal puncture site, dressing clean dry and intact. Pt given verbal instructions and pt verbalizes understanding. Questions answered. Pt denies pain and discomfort. Pt waiting for transport back to room. Report called to Pt's RN.

## 2019-11-26 NOTE — ASSESSMENT & PLAN NOTE
Unclear at this time etiology of LORENE, but likely from HRS (hyponatremia, low/normal Bps) vs prolonged prerenal or volume depletion after paracentesis 6L removal at prior hospital    Plan:  - Urine microscopy by MD  - Urinalysis  - UPCr  - Urine Na  - Renal US  - Strict I/Os and chart  - Agree with HRS protocol; aim for BP MAP >85  - Hb > 7 gm/dL  - Avoid nephrotoxic agents, NSAIDs, IV contrast, etc  - Will follow closely

## 2019-11-26 NOTE — HPI
35 y/o man with hx of alcohol abuse, alcoholic cirrhosis decompensated with ascites,  morbid obesity, no history of CKD baseline sCr 0.7 (as of 11/20/2019), admitted on 11/25/2019 as a transfer from OSH for liver txp evaluation and MANAS, concern for HRS.  Originally presented on 11/20 to OSH with worsening abdominal pain and distention, along with increased lower extremity edema, and also diagnosed with SBP and pancreatitis.  Last drink ~1 month ago (started drinking ~15 yrs ago, with 2-3 wine bottles/day).  On admit, sCr 0.7, had paracentesis with ~6L removal, administered albumin post procedure, no report of acute hypotension during previous hospital stay, yet subsequently developed rise in sCr to 1.63 on 11/22, 2.39 on 11/23, 2.44 on 11/25, and labs on arrival to [facility] on 11/25 up to 3.1.  He was initiated in HRS treatment, receiving albumin, octreotide, and midodrine.  Ofr SBP started on ceftriaxone.      Nephrology consulted for evaluation of Manas.

## 2019-11-26 NOTE — H&P
"History and Physical  Hospital Medicine       Patient Name: Luis uHrd  MRN:  [id number]  Hospital Medicine Team: Networked reference to record PCT  Ender Cervantes MD  Date of Admission:  11/25/2019     Principal Problem:  Spontaneous bacterial peritonitis   Primary Care Physician: William Nettles MD      History of Present Illness:     35 Y/O hx of alcohol abuse, alcoholic cirrhosis decompensated with ascites,  morbid obesity and concern for hepatorenal syndrome. Referred for transfer for hepatology evaluation.      Patient admitted 11/20 after presenting with worsening abdominal pain and distension with increased LE edema, pt diagnosed with SBP.  He had quit drinking 2-3 weeks PTA, made decision to stop drinking due to overall malaise and "alcohol wasn't making him feel good anymore" No report that patient had alcohol withdrawal.   Since stopping, he has had progressive abdominal distension, with  persistent nausea/vomiting and 2 days prior to presentation severe abdominal pain.      He was admitted with acute pancreatitis lipase 700 and also had paracentesis diagnosed with SBP, patient was not encephalopathic on admission and remains w/o encephalopathy but overall labs and clinical condition that referring MD feels that patient is looking worse daily.  US shows pt with cirrhotic liver, portal HTN and ascites, GI consults following and patient started on pentoxyfiline as empiric treatment if underlying acute alcoholic hepatitis was accelerating pts condition.      His Admit Cr was 0.7, the paracentesis pt had 6L was removed with post-procedure albumin but he has had progressive LORENE.  No report of acute hypotension or a shock picture of any kind on admit. Patient has been initiated on HRS treatment, has been receiving albumin daily, is on SQ octreotide and midodrine      Patient diagnosed with SBP and started on ceftriaxone, blood cultures negative on admission, no repeat cultures done, no reports of fevers.  " Infectious Disease consultation evaluated patient and recently patient escalated to meropenem.      When speaking with patient about alcohol use, states drinking for over 15 years and 2 to 3 wine bottles a day, states no withdrawals when decided to quit on his own.  Has never been to rehab and has had 1 DUI in the past.  Mother at the bedside. Currently patient denies any abdominal pain, with mild nausea.  No evidence of confusion at any point during his admission.      Review of Systems   Constitutional: Negative for chills, fatigue, fever.   HENT: Negative for sore throat, trouble swallowing.    Eyes: Negative for photophobia, visual disturbance.   Respiratory: Negative for cough, shortness of breath.    Cardiovascular: Negative for chest pain, palpitations, leg swelling.   Gastrointestinal: Negative for abdominal pain, constipation, diarrhea, positive nausea, vomiting.   Endocrine: Negative for cold intolerance, heat intolerance.   Genitourinary: Negative for dysuria, frequency.   Musculoskeletal: Negative for arthralgias, myalgias.   Skin: Negative for rash, wound, erythema   Neurological: Negative for dizziness, syncope, weakness, light-headedness.   Psychiatric/Behavioral: Negative for confusion, hallucinations, anxiety  All other systems reviewed and are negative.      Past Medical History: Patient has no past medical history on file.    Past Surgical History: Patient has no past surgical history on file.    Social History: Patient     Family History: family history is not on file.    Medications: Scheduled Meds:   albumin human 25%  25 g Intravenous Q6H    heparin (porcine)  5,000 Units Subcutaneous Q8H    octreotide  100 mcg Subcutaneous Q8H    phytonadione ((AQUA-MEPHYTON) IVPB  10 mg Intravenous Daily    piperacillin-tazobactam (ZOSYN) IVPB  4.5 g Intravenous Q8H     Continuous Infusions:  PRN Meds:.acetaminophen, Dextrose 10% Bolus, Dextrose 10% Bolus, glucagon (human recombinant), glucose, glucose,  ondansetron, promethazine (PHENERGAN) IVPB, sodium chloride 0.9%    Allergies: Patient has No Known Allergies.    Physical Exam:     Vital Signs (Most Recent):  Temp: 97 °F (36.1 °C) (11/26/19 0035)  Pulse: 105 (11/26/19 0035)  Resp: 18 (11/25/19 2050)  BP: 127/68 (11/26/19 0035)  SpO2: 97 % (11/26/19 0035) Vital Signs Range (Last 24H):  Temp:  [97 °F (36.1 °C)-97.7 °F (36.5 °C)]   Pulse:  [105-106]   Resp:  [18-20]   BP: (119-130)/(64-74)   SpO2:  [96 %-99 %]    Body mass index is 36.66 kg/m².     Physical Exam:  Constitutional: appears weak and ill  Head: Normocephalic and atraumatic.   Mouth/Throat: Oropharynx is clear and moist.   Eyes: EOM are normal. Pupils are equal, round, and reactive to light. positive scleral icterus.   Neck: Normal range of motion. Neck supple.   Cardiovascular: Normal rate and regular rhythm.  No murmur heard.  Pulmonary/Chest: Effort normal and breath sounds normal. No respiratory distress. No wheezes, rales, or rhonchi  Abdominal: Soft. Bowel sounds are normal.  positive distension, no tenderness  Musculoskeletal: Normal range of motion. No edema.   Neurological: Alert and oriented to person, place, and time.   Skin: Skin is warm and dry.   Psychiatric: Normal mood and affect. Behavior is normal.   Vitals reviewed.    Recent Labs   Lab 11/25/19 2207   WBC 35.36*   HGB 11.4*   HCT 34.5*          Recent Labs   Lab 11/25/19 2207   *   K 4.3   CL 94*   CO2 24   BUN 52*   CREATININE 3.1*   *   CALCIUM 8.4*   LIPASE 542*     Recent Labs   Lab 11/25/19 2207   ALKPHOS 200*   ALT 52*   *   ALBUMIN 2.8*   PROT 6.0   BILITOT 10.2*   INR 1.7*      No results for input(s): POCTGLUCOSE in the last 168 hours.      Assessment and Plan:     Mr. Luis Hurd is a 34 y.o. male who presented to [facility] on 11/25/2019 with     Active Hospital Problems    Diagnosis  POA    *Spontaneous bacterial peritonitis [K65.2]  Yes    Hepatorenal syndrome [K76.7]  Yes     Decompensated hepatic cirrhosis [K72.90]  Yes    Alcoholic cirrhosis of liver with ascites [K70.31]  Yes    Coagulopathy [D68.9]  Yes    Severe sepsis with acute organ dysfunction [A41.9, R65.20]  Yes    Anemia of chronic disease [D63.8]  Yes    Hyponatremia [E87.1]  Yes    Alcoholic hepatitis with ascites [K70.11]  Yes    Severe alcohol dependence [F10.20]  Yes    Acute pancreatitis [K85.90]  Yes    LORENE (acute kidney injury) [N17.9]  Yes    Obesity (BMI 30-39.9) [E66.9]  Yes      Resolved Hospital Problems   No resolved problems to display.     # Severe sepsis with organ dysfunction  # SBP  - patient diagnosed on 11/21 and started on rocephin then switched to meropenem at OSH  - today would be Day 5 of treatment, will start patient on Zosyn here and plan for IR paracentesis in the AM to check for resolution and follow up cultures     # Decompensated alcohol cirrhosis with ascites  # Alcohol hepatitis with ascites  # Severe alcohol dependence   MELD-Na score: 34 at 11/25/2019 10:07 PM  MELD score: 32 at 11/25/2019 10:07 PM  Calculated from:  Serum Creatinine: 3.1 mg/dL at 11/25/2019 10:07 PM  Serum Sodium: 131 mmol/L at 11/25/2019 10:07 PM  Total Bilirubin: 10.2 mg/dL at 11/25/2019 10:07 PM  INR(ratio): 1.7 at 11/25/2019 10:07 PM  Age: 34 years  - patient's last drink about a month ago  - hepatology consulted  - addiction psych consulted   - PETH pending; urine tox  - getting U/S liver with doppler  - will likely need inpatient liver transplant evaluation; supportive family and good insight into his alcohol abuse  - IR paracentesis in AM, no more than 4L to be removed  - viral studies OSH largely negative  - thiamine, folate     # LORENE  # Hepatorenal Syndrome  - was being treated for HRS at OSH with rise in Cr with SBP  - patient also had IV contrast and had large volume paracentesis and does not appear to have gotten albumin after  - will start on albumin and octreotide here, BP ok   - getting urine  studies and renal U/S  - nephrology consult  - strict I/O     # Acute pancreatitis   - due to alcohol   - lipase improved from OSH;   - will trial on CLD; no evidence of stones on CT OSH    # Coagulopathy due to liver disease  - DIC labs  - vitamin K for 3 days     # Hyponatremia  - fluid restrict     # Anemia of chronic disease  - iron studies, ferritin and folate    # Obesity   Body mass index is 36.66 kg/m².  - dietary     Diet:  CLD  GI PPx:    DVT PPx:  Heparin   Goals of Care:  full    High Risk Conditions:  Severe sepsis     Disposition:  Too sick to leave currently, likely will need inpatient eval for liver tx; possibly over weekend    Ender Cervantes MD  Medical Director [facility]  Spectra:  [phone]  Pager: [phone]

## 2019-11-26 NOTE — PLAN OF CARE
Plan of care reviewed with patient who verbalized understanding. AAOx4. VSS on room air. Clear liquid diet. PRN Zofran given for nausea. No complaints of pain. Safety precautions maintained throughout shift.  No acute events this shift. WCTM.

## 2019-11-26 NOTE — CONSULTS
[facility]  Nephrology  Consult Note    Patient Name: Luis Hurd  MRN: [id number]  Admission Date: 11/25/2019  Hospital Length of Stay: 1 days  Attending Provider: Mya Subramanian MD   Primary Care Physician: William Nettles MD  Principal Problem:<principal problem not specified>    Inpatient consult to Nephrology  Consult performed by: Garcia Landis MD  Consult ordered by: Ender Cervantes MD        Subjective:     HPI: 35 y/o man with hx of alcohol abuse, alcoholic cirrhosis decompensated with ascites,  morbid obesity, no history of CKD baseline sCr 0.7 (as of 11/20/2019), admitted on 11/25/2019 as a transfer from OSH for liver txp evaluation and LORENE, concern for HRS.  Originally presented on 11/20 to OSH with worsening abdominal pain and distention, along with increased lower extremity edema, and also diagnosed with SBP and pancreatitis.  Last drink ~1 month ago (started drinking ~15 yrs ago, with 2-3 wine bottles/day).  On admit, sCr 0.7, had paracentesis with ~6L removal, administered albumin post procedure, no report of acute hypotension during previous hospital stay, yet subsequently developed rise in sCr to 1.63 on 11/22, 2.39 on 11/23, 2.44 on 11/25, and labs on arrival to [facility] on 11/25 up to 3.1.  He was initiated in HRS treatment, receiving albumin, octreotide, and midodrine.  Ofr SBP started on ceftriaxone.      Nephrology consulted for evaluation of L[place]ne.      History reviewed. No pertinent past medical history.    History reviewed. No pertinent surgical history.    Review of patient's allergies indicates:  No Known Allergies  Current Facility-Administered Medications   Medication Frequency    acetaminophen tablet 325 mg Q4H PRN    albumin human 25% bottle 25 g Q6H    dextrose 10% (D10W) Bolus PRN    dextrose 10% (D10W) Bolus PRN    folic acid tablet 1 mg Daily    glucagon (human recombinant) injection 1 mg PRN    glucose chewable tablet 16 g PRN    glucose  chewable tablet 24 g PRN    heparin (porcine) injection 5,000 Units Q8H    octreotide injection 100 mcg Q8H    ondansetron tablet 8 mg Q6H PRN    phytonadione vitamin k (AQUA-MEPHYTON) 10 mg in dextrose 5 % 50 mL IVPB Daily    piperacillin-tazobactam 4.5 g in sodium chloride 0.9% 100 mL IVPB (ready to mix system) Q8H    promethazine (PHENERGAN) 12.5 mg in dextrose 5 % 50 mL IVPB Q6H PRN    sodium chloride 0.9% flush 10 mL PRN    thiamine tablet 100 mg Daily     Family History     None        Tobacco Use    Smoking status: Not on file   Substance and Sexual Activity    Alcohol use: Not on file    Drug use: Not on file    Sexual activity: Not on file     Review of Systems   Constitutional: Negative for activity change, chills, fatigue and fever.   HENT: Negative for congestion and facial swelling.    Respiratory: Negative for cough, chest tightness and shortness of breath.    Gastrointestinal: Positive for abdominal distention and abdominal pain. Negative for diarrhea and nausea.   Genitourinary: Negative for decreased urine volume and difficulty urinating.   Musculoskeletal: Negative for back pain and myalgias.   Neurological: Negative for tremors, seizures and syncope.   Psychiatric/Behavioral: Negative for agitation and confusion.     Objective:     Vital Signs (Most Recent):  Temp: 98 °F (36.7 °C) (11/26/19 0924)  Pulse: 103 (11/26/19 0924)  Resp: 18 (11/26/19 0924)  BP: 130/64 (11/26/19 0924)  SpO2: 95 % (11/26/19 0924)  O2 Device (Oxygen Therapy): room air (11/25/19 2050) Vital Signs (24h Range):  Temp:  [97 °F (36.1 °C)-98 °F (36.7 °C)] 98 °F (36.7 °C)  Pulse:  [103-106] 103  Resp:  [18-20] 18  SpO2:  [95 %-99 %] 95 %  BP: (119-130)/(64-74) 130/64     Weight: 129.5 kg (285 lb 7.9 oz) (11/25/19 2322)  Body mass index is 36.66 kg/m².  Body surface area is 2.6 meters squared.    No intake/output data recorded.    Physical Exam   Constitutional: He is oriented to person, place, and time. He appears  well-developed and well-nourished.   HENT:   Head: Normocephalic and atraumatic.   Eyes: Pupils are equal, round, and reactive to light. EOM are normal.   Neck: Normal range of motion. Neck supple.   Cardiovascular: Normal rate and regular rhythm.   Pulmonary/Chest: Effort normal. No respiratory distress. He has no wheezes.   Abdominal: Soft. He exhibits distension.   Musculoskeletal: Normal range of motion. He exhibits edema (low extremity pitting +2).   Neurological: He is alert and oriented to person, place, and time.   Skin: Skin is warm.   Nursing note and vitals reviewed.      Significant Labs:  CBC:   Recent Labs   Lab 11/25/19 2207   WBC 35.36*   RBC 3.07*   HGB 11.4*   HCT 34.5*      *   MCH 37.1*   MCHC 33.0     CMP:   Recent Labs   Lab 11/25/19 2207   *   CALCIUM 8.4*   ALBUMIN 2.8*   PROT 6.0   *   K 4.3   CO2 24   CL 94*   BUN 52*   CREATININE 3.1*   ALKPHOS 200*   ALT 52*   *   BILITOT 10.2*     All labs within the past 24 hours have been reviewed.    Significant Imaging:  Renal US personally reviewed.    Assessment/Plan:     MANAS (acute kidney injury)  Unclear at this time etiology of MANAS, but likely from HRS (hyponatremia, low/normal Bps) vs prolonged prerenal or volume depletion after paracentesis 6L removal at prior hospital    Plan:  - Urine microscopy by MD (mostly hyaline casts, a few RTECs, no overt signs of ATN)  - Urinalysis  - UPCr  - Urine Na  - Renal US  - Strict I/Os and chart  - Agree with HRS protocol; aim for BP MAP >85  - Hb > 7 gm/dL  - Avoid nephrotoxic agents, NSAIDs, IV contrast, etc  - Will follow closely      Hyponatremia  Likely from his liver failure and volume overload (hypervolemic hyponatremia)   Refer to [facility]    Decompensated hepatic cirrhosis  Per primary team  Liver team evaluation for txp    Spontaneous bacterial peritonitis  Management per primary team         Thank you for your consult. I will follow-up with patient. Please contact us  if you have any additional questions.    Garcia Landis MD  Nephrology  [facility]    ATTENDING PHYSICIAN ATTESTATION  I have personally verified the history and examined the patient. I thoroughly reviewed the demographic, clinical, laboratorial and imaging information available in medical records. I agree with the assessment and recommendations provided by the subspecialty resident who was under my supervision.

## 2019-11-26 NOTE — PROGRESS NOTES
[facility] with no IV access. While patient was getting labs, he told me that he did not want to be stuck again. He stated that he would wait until the morning. Notified IM-L MD on call of patient not wanting an IV tonight and about patient wanting something for nausea PRN PO. MD ordered PRN Zofran PO. Patient is now resting peacefully in bed. DEYVI.

## 2019-11-26 NOTE — SUBJECTIVE & OBJECTIVE
History reviewed. No pertinent past medical history.    History reviewed. No pertinent surgical history.    Review of patient's allergies indicates:  No Known Allergies  Current Facility-Administered Medications   Medication Frequency    acetaminophen tablet 325 mg Q4H PRN    albumin human 25% bottle 25 g Q6H    dextrose 10% (D10W) Bolus PRN    dextrose 10% (D10W) Bolus PRN    folic acid tablet 1 mg Daily    glucagon (human recombinant) injection 1 mg PRN    glucose chewable tablet 16 g PRN    glucose chewable tablet 24 g PRN    heparin (porcine) injection 5,000 Units Q8H    octreotide injection 100 mcg Q8H    ondansetron tablet 8 mg Q6H PRN    phytonadione vitamin k (AQUA-MEPHYTON) 10 mg in dextrose 5 % 50 mL IVPB Daily    piperacillin-tazobactam 4.5 g in sodium chloride 0.9% 100 mL IVPB (ready to mix system) Q8H    promethazine (PHENERGAN) 12.5 mg in dextrose 5 % 50 mL IVPB Q6H PRN    sodium chloride 0.9% flush 10 mL PRN    thiamine tablet 100 mg Daily     Family History     None        Tobacco Use    Smoking status: Not on file   Substance and Sexual Activity    Alcohol use: Not on file    Drug use: Not on file    Sexual activity: Not on file     Review of Systems   Constitutional: Negative for activity change, chills, fatigue and fever.   HENT: Negative for congestion and facial swelling.    Respiratory: Negative for cough, chest tightness and shortness of breath.    Gastrointestinal: Positive for abdominal distention and abdominal pain. Negative for diarrhea and nausea.   Genitourinary: Negative for decreased urine volume and difficulty urinating.   Musculoskeletal: Negative for back pain and myalgias.   Neurological: Negative for tremors, seizures and syncope.   Psychiatric/Behavioral: Negative for agitation and confusion.     Objective:     Vital Signs (Most Recent):  Temp: 98 °F (36.7 °C) (11/26/19 0924)  Pulse: 103 (11/26/19 0924)  Resp: 18 (11/26/19 0924)  BP: 130/64 (11/26/19  0924)  SpO2: 95 % (11/26/19 0924)  O2 Device (Oxygen Therapy): room air (11/25/19 2050) Vital Signs (24h Range):  Temp:  [97 °F (36.1 °C)-98 °F (36.7 °C)] 98 °F (36.7 °C)  Pulse:  [103-106] 103  Resp:  [18-20] 18  SpO2:  [95 %-99 %] 95 %  BP: (119-130)/(64-74) 130/64     Weight: 129.5 kg (285 lb 7.9 oz) (11/25/19 2322)  Body mass index is 36.66 kg/m².  Body surface area is 2.6 meters squared.    No intake/output data recorded.    Physical Exam   Constitutional: He is oriented to person, place, and time. He appears well-developed and well-nourished.   HENT:   Head: Normocephalic and atraumatic.   Eyes: Pupils are equal, round, and reactive to light. EOM are normal.   Neck: Normal range of motion. Neck supple.   Cardiovascular: Normal rate and regular rhythm.   Pulmonary/Chest: Effort normal. No respiratory distress. He has no wheezes.   Abdominal: Soft. He exhibits distension.   Musculoskeletal: Normal range of motion. He exhibits edema (low extremity pitting +2).   Neurological: He is alert and oriented to person, place, and time.   Skin: Skin is warm.   Nursing note and vitals reviewed.      Significant Labs:  CBC:   Recent Labs   Lab 11/25/19 2207   WBC 35.36*   RBC 3.07*   HGB 11.4*   HCT 34.5*      *   MCH 37.1*   MCHC 33.0     CMP:   Recent Labs   Lab 11/25/19 2207   *   CALCIUM 8.4*   ALBUMIN 2.8*   PROT 6.0   *   K 4.3   CO2 24   CL 94*   BUN 52*   CREATININE 3.1*   ALKPHOS 200*   ALT 52*   *   BILITOT 10.2*     All labs within the past 24 hours have been reviewed.    Significant Imaging:  Renal US personally reviewed.

## 2019-11-26 NOTE — ASSESSMENT & PLAN NOTE
Patient is a 34-year-old male with history of alcohol abuse in new diagnosis of alcoholic cirrhosis who presented with SBP and pancreatitis, worsening despite treatment with IV antibiotics.  Unclear if the patient has another source of infection, cultures were negative so far, and no other clear source of infection.  Therefore we recommend further imaging to rule out pancreatic infection.  Current meld score is 36.    -decompensated cirrhosis due to SBP and pancreatitis:    -paracentesis with persistent SBP, although PMNs are improving.   -continue IV Zosyn.   -obtain MRCP to evaluate for pancreatic complications   -IV fluids as indicated.  -acute kidney injury:  Suspect due to hepatorenal syndrome, continue albumin, midodrine, and octreotide.  -varices screening:  No EGD in the past  -hepatocellular carcinoma screening:  Abdominal imaging with CT abdomen and ultrasound negative.  AFP with a.m. Labs.  -transplant candidacy:  Addiction psych evaluation.  Will determine need to initiate transplant evaluation depending clinical course and addiction psych evaluation

## 2019-11-26 NOTE — PLAN OF CARE
POC reviewed with pt and family.  Pt A & O x 4, adequate urine output via urinal, noriega catheter, commode. Vital signs stable on room air. Pt denies pain.  Midline IV catheter placed today, remedios blood.

## 2019-11-26 NOTE — CONSULTS
Single lumen 18g x 10cm midline placed to rt basilic vein.  Max dwell date12/25/19-, Lot#[id number].  Needle advance into the vessel under real time ultrasound guidance.  Image recorded and saved.

## 2019-11-27 PROBLEM — K72.00 ACUTE LIVER FAILURE WITHOUT HEPATIC COMA: Status: ACTIVE | Noted: 2019-01-01

## 2019-11-27 NOTE — PLAN OF CARE
Patient is alert and oriented. Able to make needs known to staff. PRN Tramadol given for pain control. No s/s of respiratory/cardiac distress noted. Tachycardia noted. PIV and right midline are patent and flush well. IV antibiotics continue with no adverse reactions noted. Patient remains on a 2 gram sodium diet and is tolerating it well. Myers catheter remains for urinary retention and is draining yellow urine. VS stable. No issues or concerns at this time. Continue to monitor.

## 2019-11-27 NOTE — PROGRESS NOTES
Pt's low dark ana colored urine output reviewed with the on call Physician for IM L. Additional orders to be considered. No new orders given at this time. Will continue to monitor.

## 2019-11-27 NOTE — PROGRESS NOTES
Progress Note  Hospital Medicine  [facility]         Patient Name: Luis Hurd  MRN:  [id number]  Hospital Medicine Team: [facility] HOSP MED L Mya Subramanian MD  Date of Admission:  11/25/2019     Length of Stay:  LOS: 2 days   Expected Discharge Date: 11/30/2019  Principal Problem:  Spontaneous bacterial peritonitis     Subjective:     Interval History/Overnight Events:    Doing well today, no acute changes.  Mental status at baseline.  Bowel regimen started for constipation.  Creatinine increased to 3.9, Myers catheter in place since last night due to decreased urine output and some concern for obstruction.  Patient has been on H RS medications, midodrine added on today in hopes to improve perfusion to the kidneys.  MRCP performed and is showing 1.4 cm pancreatic tail cyst.  No biliary obstruction or pancreatic necrosis noted. WBC count decreasing on Zosyn.  Treating SBP  Cultures remain negative growth to date  Hepatology following.  Obtaining financial approval from insurance company for initiation of liver transplant eval     albumin human 25%  25 g Intravenous BID    folic acid  1 mg Oral Daily    midodrine  10 mg Oral TID    octreotide  100 mcg Subcutaneous Q8H    piperacillin-tazobactam (ZOSYN) IVPB  4.5 g Intravenous Q8H    polyethylene glycol  17 g Oral Daily    senna-docusate 8.6-50 mg  1 tablet Oral Daily    thiamine  100 mg Oral Daily           acetaminophen, Dextrose 10% Bolus, Dextrose 10% Bolus, glucagon (human recombinant), glucose, glucose, ondansetron, promethazine (PHENERGAN) IVPB, sodium chloride 0.9%, traMADol    Review of Systems   Constitutional: Negative for chills, fatigue, fever.   HENT: Negative for sore throat, trouble swallowing.    Eyes: Negative for photophobia, visual disturbance.   Respiratory: Negative for cough, shortness of breath.    Cardiovascular: Negative for chest pain, palpitations, leg swelling.   Gastrointestinal: Negative for  abdominal pain, constipation, diarrhea, nausea, vomiting.   Endocrine: Negative for cold intolerance, heat intolerance.   Genitourinary: Negative for dysuria, frequency.   Musculoskeletal: Negative for arthralgias, myalgias.   Skin: Negative for rash, wound, erythema   Neurological: Negative for dizziness, syncope, weakness, light-headedness.   Psychiatric/Behavioral: Negative for confusion, hallucinations, anxiety  All other systems reviewed and are negative.    Objective:     Temp:  [96.6 °F (35.9 °C)-98.4 °F (36.9 °C)]   Pulse:  [101-104]   Resp:  [16-22]   BP: (125-131)/(56-68)   SpO2:  [94 %-97 %]       Physical Exam:  Constitutional: appears older than stated age, chronically ill looking,  non-distressed, not diaphoretic.   HENT: NC/AT, external ears normal, oropharynx clear, MMM w/o exudates.   Eyes: PERRL, EOMI, conjunctiva normal, no discharge b/l, +scleral icterus   Neck: normal ROM, supple  CV: RRR, no m/r/g, no carotid bruits, +2 peripheral pulses.  Pulmonary/Chest wall: Breathing comfortably w/o distress, CTAB, no w/r/r, no crackles.    GI: Soft, non-tender, (+) BS, (+) BM   +distended,  Caput medusa present,  Obese abdomen  Musculoskeletal: Normal ROM, no atrophy,  +trace pitting edema in LE bilaterally   Neurological: AAO x 4, CN II-XI in tact, nl sensation, nl strength/tone  No asterixis   Skin: warm, dry   +pallor, jaundice, + spider angiomas, +bruising   Psych: normal mood and affect, normal behavior, thought content and judgement.    Labs:    Chemistries:   Recent Labs   Lab 11/25/19  2207 11/26/19  1005 11/27/19  0457   * 132* 131*   K 4.3 4.2 4.4   CL 94* 96 95   CO2 24 26 25   BUN 52* 58* 64*   CREATININE 3.1* 3.4* 3.9*   CALCIUM 8.4* 8.1* 8.6*   PROT 6.0 5.2* 5.2*   BILITOT 10.2* 8.9* 8.9*   ALKPHOS 200* 197* 196*   ALT 52* 47* 39   * 138* 114*   MG  --  2.5 2.5   PHOS  --  4.9* 4.3        WBC:   Recent Labs   Lab 11/25/19 2207 11/26/19  1005 11/27/19  0457   WBC 35.36* 25.97*  21.84*     Bands:     CBC/Anemia Labs: Coags:    Recent Labs   Lab 11/25/19 2207 11/26/19  1005 11/27/19  0457   WBC 35.36* 25.97* 21.84*   HGB 11.4* 10.1* 8.9*   HCT 34.5* 30.3* 27.6*    155 109*   * 112* 111*   RDW 14.0 13.9 13.7   IRON  --  36*  --    FERRITIN  --  2,782*  --    FOLATE  --  7.6  --    [id number]  --  >2000*  --     Recent Labs   Lab 11/25/19 2207 11/26/19  1005 11/27/19  0457   INR 1.7* 2.2* 1.5*        Diagnostic Results:     Ref. Range 11/26/2019 07:45   Fluid Color Unknown Yellow   Fluid Appearance Unknown Cloudy   Body Fluid Type Unknown Ascites   WBC, Body Fluid Latest Units: /cu mm 855   Segs, Fluid Latest Units: % 49   Lymphs, Fluid Latest Units: % 13   Monocytes/Macrophages, Fluid Latest Units: % 37   Body Fluid Albumin Latest Ref Range: See text g/dL 1.1   Body Fluid Source, Albumin Unknown Ascites   Body Fluid Source, Total Protein Unknown Ascites   Body Fluid, Protein Latest Ref Range: Not established g/dL 2.1       Assessment and Plan     Hospital Course:    Mr. uLis Hurd was admitted to Hospital Medicine for management of SBP , ARF, and  Decompensated cirrhosis/ liver failure     Active Hospital Problems    Diagnosis  POA    *Spontaneous bacterial peritonitis [K65.2]  Yes    Hepatorenal syndrome [K76.7]  Yes    Decompensated hepatic cirrhosis [K72.90]  Yes    Alcoholic cirrhosis of liver with ascites [K70.31]  Yes    Coagulopathy [D68.9]  Yes    Severe sepsis with acute organ dysfunction [A41.9, R65.20]  Yes    Anemia of chronic disease [D63.8]  Yes    Hyponatremia [E87.1]  Yes    Alcoholic hepatitis with ascites [K70.11]  Yes    Severe alcohol dependence [F10.20]  Yes    Acute pancreatitis [K85.90]  Yes    LORENE (acute kidney injury) [N17.9]  Yes    Macrocytic anemia [D53.9]  Yes    Severe protein-calorie malnutrition [E43]  Yes    Encounter for pre-transplant evaluation for liver transplant [Z01.818]  Not Applicable      Resolved Hospital Problems  "  No resolved problems to display.       Severe sepsis with organ dysfunction  SBP  - patient diagnosed on 11/21 and started on rocephin then switched to meropenem at OSH  - on 11/25, would be Day 5 of treatment, Zosyn  Started on presentation at [facility] on 11/25/2019   -  Patient underwent 4L paracentesis 11/26/2019, fluid analysis continues to show evidence of SBP with 850  WBCs in 49% segmented neutrophils.  WBC count in the periphery decreasing to 26 from 35.   -  Continue Zosyn.  Follow-up ascitic cultures  -  Follow-up blood cultures- NGTD  -  Continue albumin  -  MRCP on 11/26/2019 showed no pancreatic abscess,  Hepatic biliary obstruction. showing 1.4 cm pancreatic tail cyst  - will need repeat LVP/ diagnostic paracentesis on 11/29/2019 to ensure clearance of SBP     Acute liver failure without hepatic coma  Decompensated alcohol cirrhosis with ascites  Alcohol hepatitis with ascites  Severe alcohol dependence   MELD-Na score: 34 at 11/27/2019  4:57 AM  MELD score: 32 at 11/27/2019  4:57 AM  Calculated from:  Serum Creatinine: 3.9 mg/dL at 11/27/2019  4:57 AM  Serum Sodium: 131 mmol/L at 11/27/2019  4:57 AM  Total Bilirubin: 8.9 mg/dL at 11/27/2019  4:57 AM  INR(ratio): 1.5 at 11/27/2019  4:57 AM  Age: 34 years     - patient's last drink about a month ago  Prior to presentation  - viral studies OSH largely negative  - thiamine, folate   - hepatology consulted  - addiction psych consulted   - PETH pending; urine tox  - U/S liver with doppler shows "Findings consistent with cirrhosis and sequela of portal hypertension including a large volume of ascites, splenomegaly, and collateral vessel formation"   -  Treat SBP as above  -  Treat renal dysfunction as below  - obtained financial approval from insurance company for initiation of liver transplant eval- serologies ordered for 11/28 AM     LORENE  Hepatorenal Syndrome  - was being treated for HRS at OSH with rise in Cr with SBP. atient also had IV contrast and had " large volume paracentesis and does not appear to have gotten albumin after  -  Creatinine 3.1 on presentation at [facility]  - cont albumin and octreotide   -  Midodrine held as patient is not hypotensive  - pending urine studies-  Urine has not been collected   - nephrology consulted  - strict I/O .noriega in place  - midodrine started on 11/27, in attempt to hyper perfuse kidneys      Acute pancreatitis   - due to alcohol   - lipase improved from OSH;  Lipase here 540-->453  - CLD--> advance diet on 11/26  - MRCP 11/26- showed 1.4 cm lesion in the tail of the pancreas as described above.  Considerations include a pancreatic cyst, small pancreatic pseudocysts, or cystic neoplasm. No acute pancreatitis      Coagulopathy due to liver disease  -  Likely due to cirrhosis/  Current decompensation, liver failure  -  fibrinogen 262  - vitamin K for 3 days      Hyponatremia  - fluid restrict . Na 132     Macrocytic anemia  Anemia of chronic disease  - hemoglobin 11-10, . Likely due to alcoholism and liver disease  -  Hemolysis labs reviewed, show some evidence of hemolysis  -  iron studies, ferritin and folate-  Showed evidence of anemia of chronic disease  -  Continue folate     Obesity   Severe protein-calorie malnutrition  Body mass index is 36.66 kg/m²  On admission --> Body mass index is 36.66 kg/m².  -  Albumin 2.4, pre-albumin 4. Likely due to alcoholism/  Cirrhosis,  In setting of acute decompensation  -  Advancing diet as tolerated  -  Boost  -  Dietary consult    Diet:  Low Na with fluid restriction   GI PPx:  PO PPI  DVT PPx:  SCDs due to coagulopathy   Goals of Care:  Full     High Risk Conditions:  care home, SBP    Disposition:    TBD.  Undergoing liver transplant evaluation.  Likely week of 12/2      Patient's note was created using MModal Dictation.  Any errors in syntax may not have been identified and edited on initial review prior to signing this note.    Signing Physician:     Mya Subramanian MD  Department  of Hospital Medicine [facility]  Pager [phone]  Spectra [phone]  11/27/2019

## 2019-11-27 NOTE — SUBJECTIVE & OBJECTIVE
Interval History: LINDA. Joey trending up. Will keep monitor    Review of patient's allergies indicates:  No Known Allergies  Current Facility-Administered Medications   Medication Frequency    acetaminophen tablet 325 mg Q4H PRN    albumin human 25% bottle 25 g BID    dextrose 10% (D10W) Bolus PRN    dextrose 10% (D10W) Bolus PRN    folic acid tablet 1 mg Daily    glucagon (human recombinant) injection 1 mg PRN    glucose chewable tablet 16 g PRN    glucose chewable tablet 24 g PRN    midodrine tablet 10 mg TID    octreotide injection 100 mcg Q8H    ondansetron tablet 8 mg Q6H PRN    piperacillin-tazobactam 4.5 g in sodium chloride 0.9% 100 mL IVPB (ready to mix system) Q8H    polyethylene glycol packet 17 g Daily    promethazine (PHENERGAN) 12.5 mg in dextrose 5 % 50 mL IVPB Q6H PRN    sodium chloride 0.9% flush 10 mL PRN    thiamine tablet 100 mg Daily    traMADol tablet 50 mg Q6H PRN       Objective:     Vital Signs (Most Recent):  Temp: 97.5 °F (36.4 °C) (11/27/19 1157)  Pulse: 103 (11/27/19 1157)  Resp: (!) 22 (11/27/19 1157)  BP: 125/63 (11/27/19 1157)  SpO2: 96 % (11/27/19 1157)  O2 Device (Oxygen Therapy): room air (11/27/19 0337) Vital Signs (24h Range):  Temp:  [97.4 °F (36.3 °C)-98.4 °F (36.9 °C)] 97.5 °F (36.4 °C)  Pulse:  [101-104] 103  Resp:  [16-22] 22  SpO2:  [94 %-97 %] 96 %  BP: (125-131)/(56-67) 125/63     Weight: 129.5 kg (285 lb 7.9 oz) (11/25/19 2322)  Body mass index is 36.66 kg/m².  Body surface area is 2.6 meters squared.    I/O last 3 completed shifts:  In: 950 [P.O.:600; IV Piggyback:350]  Out: 405 [Urine:405]    Physical Exam   Constitutional: He is oriented to person, place, and time. He appears well-developed and well-nourished.   HENT:   Head: Normocephalic and atraumatic.   Eyes: Pupils are equal, round, and reactive to light. EOM are normal.   Neck: Normal range of motion. Neck supple.   Cardiovascular: Normal rate and regular rhythm.   Pulmonary/Chest: Effort normal.  No respiratory distress. He has no wheezes.   Abdominal: Soft. He exhibits distension.   Musculoskeletal: Normal range of motion. He exhibits edema (low extremity pitting +2).   Neurological: He is alert and oriented to person, place, and time.   Skin: Skin is warm.   Nursing note and vitals reviewed.      Significant Labs:  All labs within the past 24 hours have been reviewed.

## 2019-11-27 NOTE — PHARMACY MED REC
"Admission Medication Reconciliation - Pharmacy Consult Note    The home medication history was taken by Laura Garcia, Pharmacy Technician.  Based on information gathered and subsequent review by the clinical pharmacist, the items below may need attention.    You may go to "Admission" then "Reconcile Home Medications" tabs to review and/or act upon these items.        No issues noted with the medication reconciliation.    Mely Burgos, PharmD, BCPS  [id number]                  .    .          "

## 2019-11-27 NOTE — PROGRESS NOTES
Pt's low urine output ( less than 30 ML/ HR) reviewed with the on call Physician for IM L. IVF or lasix to be considered by the primary team shortly. No new orders given. Will continue to monitor.

## 2019-11-27 NOTE — PLAN OF CARE
Plan of care reviewed with patient, who verbalized understanding. Pt denies pain or discomfort at this time. Distended abdomen can lead to dyspnea on exertion as pressure on diaphragm can restrict increased oxygenation demands. Pt is tolerating his low sodium diet without nause, but is still not able to eat much. Pt has not had a bowel movement in about 1 week. Low urine output and rise in creatinine is of concern, as is the presence of a noriega catheter with results of UA. Increased mobility and use of the incentive spirometer must be encouraged in the morning after a restful sleep.

## 2019-11-27 NOTE — ASSESSMENT & PLAN NOTE
Unclear at this time etiology of LORENE, but likely from HRS (hyponatremia, low/normal Bps) vs prolonged prerenal or volume depletion after paracentesis 6L removal at prior hospital  - Renal US, no significant abnormalities seen in kidneys  - Did Paracentesis yesterday    Plan:  - UPCr  - Strict I/Os and chart  - Agree with HRS protocol; aim for BP MAP >85  - Hb > 7 gm/dL  - Avoid nephrotoxic agents, NSAIDs, IV contrast, etc  - Will follow closely

## 2019-11-27 NOTE — PLAN OF CARE
11/27/19 1218   Post-Acute Status   Post-Acute Authorization Other   Other Status No Post-Acute Service Needs   Discharge Delays None known at this time

## 2019-11-27 NOTE — PLAN OF CARE
William Nettles MD   [id number] / [place] [id number]     Payor: MEDICAID / Plan: [place] / Product Type: Managed Medicaid /           11/27/19 1214   Discharge Assessment   Assessment Type Discharge Planning Assessment   Confirmed/corrected address and phone number on facesheet? Yes   Assessment information obtained from? Caregiver   Expected Length of Stay (days) 3   Communicated expected length of stay with patient/caregiver yes   Prior to hospitilization cognitive status: Alert/Oriented   Prior to hospitalization functional status: Independent   Current cognitive status: Unable to Assess   Current Functional Status: Independent   Facility Arrived From: Transfer from [facility]   Lives With alone   Able to Return to Prior Arrangements yes   Is patient able to care for self after discharge? Unable to determine at this time (comments)   Who are your caregiver(s) and their phone number(s)? Lindsay Hurd (mother) [phone]   Patient's perception of discharge disposition home or selfcare   Readmission Within the Last 30 Days no previous admission in last 30 days   Patient currently being followed by outpatient case management? No   Patient currently receives any other outside agency services? No   Equipment Currently Used at Home none   Do you have any problems affording any of your prescribed medications? No   Is the patient taking medications as prescribed? yes   Does the patient have transportation home? Yes   Transportation Anticipated family or friend will provide   Does the patient receive services at the [facility]? No   Discharge Plan A Home with family   DME Needed Upon Discharge  none   Patient/Family in Agreement with Plan yes

## 2019-11-27 NOTE — PROGRESS NOTES
Pt returns form MRI. Pt is alert, oriented and cooperative with care. Pt denies pain or discomfort at this time.

## 2019-11-27 NOTE — PROGRESS NOTES
[facility]  Nephrology  Progress Note    Patient Name: Luis Hurd  MRN: [id number]  Admission Date: 11/25/2019  Hospital Length of Stay: 2 days  Attending Provider: Mya Subramanian MD   Primary Care Physician: William Nettles MD  Principal Problem:Spontaneous bacterial peritonitis    Subjective:     HPI: 33 y/o man with hx of alcohol abuse, alcoholic cirrhosis decompensated with ascites,  morbid obesity, no history of CKD baseline sCr 0.7 (as of 11/20/2019), admitted on 11/25/2019 as a transfer from OSH for liver txp evaluation and LORENE, concern for HRS.  Originally presented on 11/20 to OSH with worsening abdominal pain and distention, along with increased lower extremity edema, and also diagnosed with SBP and pancreatitis.  Last drink ~1 month ago (started drinking ~15 yrs ago, with 2-3 wine bottles/day).  On admit, sCr 0.7, had paracentesis with ~6L removal, administered albumin post procedure, no report of acute hypotension during previous hospital stay, yet subsequently developed rise in sCr to 1.63 on 11/22, 2.39 on 11/23, 2.44 on 11/25, and labs on arrival to [facility] on 11/25 up to 3.1.  He was initiated in HRS treatment, receiving albumin, octreotide, and midodrine.  Ofr SBP started on ceftriaxone.      Nephrology consulted for evaluation of Lorene.      Interval History: NAEON. Cr trending up. Will keep monitor    Review of patient's allergies indicates:  No Known Allergies  Current Facility-Administered Medications   Medication Frequency    acetaminophen tablet 325 mg Q4H PRN    albumin human 25% bottle 25 g BID    dextrose 10% (D10W) Bolus PRN    dextrose 10% (D10W) Bolus PRN    folic acid tablet 1 mg Daily    glucagon (human recombinant) injection 1 mg PRN    glucose chewable tablet 16 g PRN    glucose chewable tablet 24 g PRN    midodrine tablet 10 mg TID    octreotide injection 100 mcg Q8H    ondansetron tablet 8 mg Q6H PRN    piperacillin-tazobactam 4.5 g in sodium  chloride 0.9% 100 mL IVPB (ready to mix system) Q8H    polyethylene glycol packet 17 g Daily    promethazine (PHENERGAN) 12.5 mg in dextrose 5 % 50 mL IVPB Q6H PRN    sodium chloride 0.9% flush 10 mL PRN    thiamine tablet 100 mg Daily    traMADol tablet 50 mg Q6H PRN       Objective:     Vital Signs (Most Recent):  Temp: 97.5 °F (36.4 °C) (11/27/19 1157)  Pulse: 103 (11/27/19 1157)  Resp: (!) 22 (11/27/19 1157)  BP: 125/63 (11/27/19 1157)  SpO2: 96 % (11/27/19 1157)  O2 Device (Oxygen Therapy): room air (11/27/19 0337) Vital Signs (24h Range):  Temp:  [97.4 °F (36.3 °C)-98.4 °F (36.9 °C)] 97.5 °F (36.4 °C)  Pulse:  [101-104] 103  Resp:  [16-22] 22  SpO2:  [94 %-97 %] 96 %  BP: (125-131)/(56-67) 125/63     Weight: 129.5 kg (285 lb 7.9 oz) (11/25/19 2322)  Body mass index is 36.66 kg/m².  Body surface area is 2.6 meters squared.    I/O last 3 completed shifts:  In: 950 [P.O.:600; IV Piggyback:350]  Out: 405 [Urine:405]    Physical Exam   Constitutional: He is oriented to person, place, and time. He appears well-developed and well-nourished.   HENT:   Head: Normocephalic and atraumatic.   Eyes: Pupils are equal, round, and reactive to light. EOM are normal.   Neck: Normal range of motion. Neck supple.   Cardiovascular: Normal rate and regular rhythm.   Pulmonary/Chest: Effort normal. No respiratory distress. He has no wheezes.   Abdominal: Soft. He exhibits distension.   Musculoskeletal: Normal range of motion. He exhibits edema (low extremity pitting +2).   Neurological: He is alert and oriented to person, place, and time.   Skin: Skin is warm.   Nursing note and vitals reviewed.      Significant Labs:  All labs within the past 24 hours have been reviewed.         Assessment/Plan:     * Spontaneous bacterial peritonitis  Management per primary team     LORENE (acute kidney injury)  Unclear at this time etiology of LORENE, but likely from HRS (hyponatremia, low/normal Bps) vs prolonged prerenal or volume depletion  after paracentesis 6L removal at prior hospital  - Renal US, no significant abnormalities seen in kidneys  - Did Paracentesis yesterday    Plan:  - UPCr  - Strict I/Os and chart  - Agree with HRS protocol; aim for BP MAP >85  - Hb > 7 gm/dL  - Avoid nephrotoxic agents, NSAIDs, IV contrast, etc  - Will follow closely      Hyponatremia  Likely from his liver failure and volume overload (hypervolemic hyponatremia)   Refer to [facility]      Thank you for your consult. I will follow-up with patient. Please contact us if you have any additional questions.    Danni Marquez MD  Nephrology  [facility]    ATTENDING PHYSICIAN ATTESTATION  I have personally verified the history and examined the patient. I thoroughly reviewed the demographic, clinical, laboratorial and imaging information available in medical records. I agree with the assessment and recommendations provided by the subspecialty resident who was under my supervision.

## 2019-11-28 NOTE — PLAN OF CARE
Problem: Malnutrition (Acute)  Goal: Improved Nutritional Intake  Outcome: Ongoing, Progressing     Problem: Oral Intake Inadequate  Goal: Improved Oral Intake  Outcome: Ongoing, Progressing     Recommendations     Recommendation/Intervention:   1.) Suggest renal (predialysis) diet.   2.) Suggest Boost Breeze BID.   3.) Suggest MVI.   4.) Daily weights.   5.) Update hand  strength.      Goals: 1.) Pt to consume/tolerate >75% EEN and EPN by follow up.   Nutrition Goal Status: new  Communication of RD Recs: (POC)

## 2019-11-28 NOTE — ASSESSMENT & PLAN NOTE
Unclear at this time etiology of LORENE, but likely from HRS (hyponatremia, low/normal Bps) vs prolonged prerenal or volume depletion after paracentesis 6L removal at prior hospital  - Renal US, no significant abnormalities seen in kidneys  - Did Paracentesis     Plan:  - U/P Cr ratio: 0.23  - Urine sodium<20  - IR Paracentesis performed on 11/26, 4 liters removed  - Strict I/Os and chart  - Agree with HRS protocol; aim for BP MAP >85  - Hb > 7 gm/dL  - Avoid nephrotoxic agents, NSAIDs, IV contrast, etc  - Will follow closely

## 2019-11-28 NOTE — PROGRESS NOTES
"[facility]  Hepatology  Progress Note    Patient Name: Luis Hurd  MRN: [id number]  Admission Date: 11/25/2019  Hospital Length of Stay: 3 days  Attending Provider: Mya Subramanian MD   Primary Care Physician: William Nettles MD  Principal Problem:Spontaneous bacterial peritonitis    Subjective:     Transplant status: No    HPI: Patient is a 34-year-old male with past medical history of alcohol abuse, newly diagnosed alcoholic cirrhosis with ascites, history of morbid obesity, who was admitted to outside hospital in 1 week ago for abdominal pain and lower extremity edema.  Despite initial evaluation consistent with SBP and a pancreatitis, the patient did not improve with IV ceftriaxone or pentoxifylline with worsening leukocytosis and kidney function.  He was transferred to [facility] for further evaluation.    Patient states that he drinks 2-3 bottles of wine every night after coming back from work, and has been doing that for a long time.  He stopped about a month ago as "it was not making him feel good".  Denies being to rehab, or having issues at work related to his alcohol use.  Endorses single DUI in the past.  Denies IV drug use, history of viral hepatitis, or history of liver disease. He was told that his liver enzymes were elevated in the past, but was never fully counseled to stop alcohol.  He was not on any medications prior to this admission.    On evaluation at outside hospital, the patient was hemodynamically stable on evaluation.  A paracentesis was done and was consistent with SBP, with PMNs around 1000.  Lipase was elevated at 700.  A CT scan of the abdomen with contrast on 11/21 was consistent with acute pancreatitis with possible early pseudocyst.  He also had features of cirrhosis with ascites. He was started on pentoxifylline, IV ceftriaxone.  During hospitalization his white count worsened to 27 from 10, creatinine 3.1 from normal, and worsening bilirubin. "  He was started on hepatorenal syndrome treatment, however with no improvement.  He was switched to meropenem prior to transfer.    Today the patient is feeling well, and endorses overall improvement of his abdominal pain.  Denies fever, chills, nausea, vomiting, change in bowel habits.  He is hemodynamically stable however mildly tachycardic.  Afebrile.  He underwent paracentesis, with consistent with improving SBP.  Of note ascitic fluid amylase was elevated at 2000 at outside hospital.    Interval History: Patient sleeping on evaluation today  Kidney function worsening and worsening leukocytosis.  MRCP done with pancreatic tail cysts.    Current Facility-Administered Medications   Medication    acetaminophen tablet 325 mg    albumin human 25% bottle 25 g    bisacodyl suppository 10 mg    dextrose 10% (D10W) Bolus    dextrose 10% (D10W) Bolus    fluconazole tablet 200 mg    folic acid tablet 1 mg    glucagon (human recombinant) injection 1 mg    glucose chewable tablet 16 g    glucose chewable tablet 24 g    magnesium hydroxide 400 mg/5 ml suspension 2,400 mg    midodrine tablet 15 mg    octreotide injection 100 mcg    ondansetron tablet 8 mg    piperacillin-tazobactam 4.5 g in sodium chloride 0.9% 100 mL IVPB (ready to mix system)    polyethylene glycol packet 17 g    promethazine (PHENERGAN) 12.5 mg in dextrose 5 % 50 mL IVPB    senna-docusate 8.6-50 mg per tablet 1 tablet    sodium chloride 0.9% flush 10 mL    thiamine tablet 100 mg    traMADol tablet 50 mg       Objective:     Vital Signs (Most Recent):  Temp: 97.6 °F (36.4 °C) (11/28/19 1146)  Pulse: 95 (11/28/19 1146)  Resp: 15 (11/28/19 1146)  BP: (!) 112/56 (11/28/19 1146)  SpO2: 95 % (11/28/19 1146) Vital Signs (24h Range):  Temp:  [96.6 °F (35.9 °C)-97.9 °F (36.6 °C)] 97.6 °F (36.4 °C)  Pulse:  [] 95  Resp:  [15-18] 15  SpO2:  [95 %-97 %] 95 %  BP: (112-131)/(56-71) 112/56     Weight: 129.5 kg (285 lb 7.9 oz) (11/25/19  2322)  Body mass index is 36.66 kg/m².    Physical Exam   Constitutional: He is oriented to person, place, and time. He appears lethargic. He appears ill. No distress.   HENT:   Head: Normocephalic.   Eyes: Conjunctivae are normal. Scleral icterus is present.   Neck: Normal range of motion. Neck supple.   Cardiovascular: Normal rate and regular rhythm.   Pulmonary/Chest: Effort normal and breath sounds normal.   Abdominal: Soft. Bowel sounds are normal. He exhibits distension. He exhibits no mass. There is no tenderness. There is no guarding.   Musculoskeletal: Normal range of motion.   Neurological: He is oriented to person, place, and time. He appears lethargic.   Skin: Skin is warm and dry.   Psychiatric: He has a normal mood and affect.       MELD-Na score: 34 at 11/28/2019  5:45 AM  MELD score: 32 at 11/28/2019  5:45 AM  Calculated from:  Serum Creatinine: 4.3 mg/dL (Rounded to 4 mg/dL) at 11/28/2019  5:45 AM  Serum Sodium: 130 mmol/L at 11/28/2019  5:45 AM  Total Bilirubin: 10.3 mg/dL at 11/28/2019  5:45 AM  INR(ratio): 1.4 at 11/28/2019  5:45 AM  Age: 34 years    Significant Labs:  CBC:   Recent Labs   Lab 11/28/19  0545   WBC 27.69*   RBC 2.41*   HGB 8.9*   HCT 27.3*   *     BMP:   Recent Labs   Lab 11/28/19  0545   *   *   K 4.5   CL 94*   CO2 24   BUN 73*   CREATININE 4.3*   CALCIUM 8.7     CMP:   Recent Labs   Lab 11/28/19  0545   *   CALCIUM 8.7   ALBUMIN 3.1*   PROT 5.5*   *   K 4.5   CO2 24   CL 94*   BUN 73*   CREATININE 4.3*   ALKPHOS 201*   ALT 36   *   BILITOT 10.3*       Significant Imaging:  Labs: Reviewed  US: Reviewed  CT: Reviewed    Assessment/Plan:     Decompensated hepatic cirrhosis  Patient is a 34-year-old male with history of alcohol abuse in new diagnosis of alcoholic cirrhosis who presented with SBP and pancreatitis, worsening despite treatment with IV antibiotics.  Unclear if the patient has another source of infection, cultures were negative so  far, and no other clear source of infection.  Therefore we recommend further imaging to rule out pancreatic infection.  Current meld score is 34.    -decompensated cirrhosis due to SBP and pancreatitis:    -paracentesis with persistent SBP, although PMNs are improving. Repeat paracentesis tomorrow if possible   -continue IV Zosyn, add fluconazole   -If continues to have worsening leukocytosis and kidney function, will consider EUS evaluation.   -IV fluids as indicated.  -acute kidney injury:  Suspect due to hepatorenal syndrome, continue albumin, midodrine, and octreotide.  -varices screening:  No EGD in the past  -hepatocellular carcinoma screening:  Abdominal imaging with CT abdomen and ultrasound negative.  AFP within normal.  -transplant candidacy: inpatient evaluation ongoing. Modifiably high risk for relapse per psych evaluation        Thank you for your consult. I will follow-up with patient. Please contact us if you have any additional questions.    Rayne Christine MD  Hepatology  [facility]

## 2019-11-28 NOTE — ASSESSMENT & PLAN NOTE
Per primary team  Liver team evaluation for txp  MELD-Na score: 34 at 11/28/2019  5:45 AM  MELD score: 32 at 11/28/2019  5:45 AM  Calculated from:  Serum Creatinine: 4.3 mg/dL (Rounded to 4 mg/dL) at 11/28/2019  5:45 AM  Serum Sodium: 130 mmol/L at 11/28/2019  5:45 AM  Total Bilirubin: 10.3 mg/dL at 11/28/2019  5:45 AM  INR(ratio): 1.4 at 11/28/2019  5:45 AM  Age: 34 years

## 2019-11-28 NOTE — SUBJECTIVE & OBJECTIVE
Interval History: Patient seen and examined at bedside, creatinine trending up, UOP was 600cc last shift.     Review of patient's allergies indicates:  No Known Allergies  Current Facility-Administered Medications   Medication Frequency    acetaminophen tablet 325 mg Q4H PRN    albumin human 25% bottle 25 g TID    dextrose 10% (D10W) Bolus PRN    dextrose 10% (D10W) Bolus PRN    fluconazole tablet 200 mg Daily    folic acid tablet 1 mg Daily    glucagon (human recombinant) injection 1 mg PRN    glucose chewable tablet 16 g PRN    glucose chewable tablet 24 g PRN    midodrine tablet 15 mg TID    octreotide injection 100 mcg Q8H    ondansetron tablet 8 mg Q6H PRN    piperacillin-tazobactam 4.5 g in sodium chloride 0.9% 100 mL IVPB (ready to mix system) Q8H    polyethylene glycol packet 17 g Daily    promethazine (PHENERGAN) 12.5 mg in dextrose 5 % 50 mL IVPB Q6H PRN    senna-docusate 8.6-50 mg per tablet 1 tablet Daily    sodium chloride 0.9% flush 10 mL PRN    thiamine tablet 100 mg Daily    traMADol tablet 50 mg Q6H PRN       Objective:     Vital Signs (Most Recent):  Temp: 97.6 °F (36.4 °C) (11/28/19 1146)  Pulse: 95 (11/28/19 1146)  Resp: 15 (11/28/19 1146)  BP: (!) 112/56 (11/28/19 1146)  SpO2: 95 % (11/28/19 1146)  O2 Device (Oxygen Therapy): room air (11/28/19 0000) Vital Signs (24h Range):  Temp:  [96.6 °F (35.9 °C)-97.9 °F (36.6 °C)] 97.6 °F (36.4 °C)  Pulse:  [] 95  Resp:  [15-18] 15  SpO2:  [95 %-97 %] 95 %  BP: (112-131)/(56-71) 112/56     Weight: 129.5 kg (285 lb 7.9 oz) (11/25/19 2322)  Body mass index is 36.66 kg/m².  Body surface area is 2.6 meters squared.    I/O last 3 completed shifts:  In: 2820 [P.O.:2220; I.V.:100; IV Piggyback:500]  Out: 930 [Urine:930]    Physical Exam   Constitutional: He is oriented to person, place, and time. He appears well-developed and well-nourished.   HENT:   Head: Normocephalic and atraumatic.   Eyes: Pupils are equal, round, and reactive to  light. EOM are normal.   Neck: Normal range of motion. Neck supple.   Cardiovascular: Normal rate and regular rhythm.   Pulmonary/Chest: Effort normal. No respiratory distress. He has no wheezes.   Abdominal: Soft. He exhibits distension.   Musculoskeletal: Normal range of motion. He exhibits edema (low extremity pitting +2).   Neurological: He is alert and oriented to person, place, and time.   Skin: Skin is warm.   Nursing note and vitals reviewed.      Significant Labs:  CBC:   Recent Labs   Lab 11/28/19  0545   WBC 27.69*   RBC 2.41*   HGB 8.9*   HCT 27.3*   *   *   MCH 36.9*   MCHC 32.6     CMP:   Recent Labs   Lab 11/28/19  0545   *   CALCIUM 8.7   ALBUMIN 3.1*   PROT 5.5*   *   K 4.5   CO2 24   CL 94*   BUN 73*   CREATININE 4.3*   ALKPHOS 201*   ALT 36   *   BILITOT 10.3*     All labs within the past 24 hours have been reviewed.     Significant Imaging:  Labs: Reviewed

## 2019-11-28 NOTE — ASSESSMENT & PLAN NOTE
Patient is a 34-year-old male with history of alcohol abuse in new diagnosis of alcoholic cirrhosis who presented with SBP and pancreatitis, worsening despite treatment with IV antibiotics.  Unclear if the patient has another source of infection, cultures were negative so far, and no other clear source of infection.  Therefore we recommend further imaging to rule out pancreatic infection.  Current meld score is 34.    -decompensated cirrhosis due to SBP and pancreatitis:    -paracentesis with persistent SBP, although PMNs are improving. Repeat paracentesis tomorrow if possible   -continue IV Zosyn, add fluconazole   -If continues to have worsening leukocytosis and kidney function, will consider EUS evaluation.   -IV fluids as indicated.  -acute kidney injury:  Suspect due to hepatorenal syndrome, continue albumin, midodrine, and octreotide.  -varices screening:  No EGD in the past  -hepatocellular carcinoma screening:  Abdominal imaging with CT abdomen and ultrasound negative.  AFP within normal.  -transplant candidacy: inpatient evaluation ongoing. Modifiably high risk for relapse per psych evaluation

## 2019-11-28 NOTE — PLAN OF CARE
Plan of care reviewed with patient, and family. Vitals stable. Denies pain/sob. Resp even/unlabored on room air. Myers discontinued per MD order, pt due to void. Bed wheels locked, call bell in reach, continue to monitor. NAD

## 2019-11-28 NOTE — SUBJECTIVE & OBJECTIVE
Interval History:       Current Facility-Administered Medications   Medication    acetaminophen tablet 325 mg    albumin human 25% bottle 25 g    bisacodyl suppository 10 mg    dextrose 10% (D10W) Bolus    dextrose 10% (D10W) Bolus    fluconazole tablet 200 mg    folic acid tablet 1 mg    glucagon (human recombinant) injection 1 mg    glucose chewable tablet 16 g    glucose chewable tablet 24 g    magnesium hydroxide 400 mg/5 ml suspension 2,400 mg    midodrine tablet 15 mg    octreotide injection 100 mcg    ondansetron tablet 8 mg    piperacillin-tazobactam 4.5 g in sodium chloride 0.9% 100 mL IVPB (ready to mix system)    polyethylene glycol packet 17 g    promethazine (PHENERGAN) 12.5 mg in dextrose 5 % 50 mL IVPB    senna-docusate 8.6-50 mg per tablet 1 tablet    sodium chloride 0.9% flush 10 mL    thiamine tablet 100 mg    traMADol tablet 50 mg       Objective:     Vital Signs (Most Recent):  Temp: 97.6 °F (36.4 °C) (11/28/19 1146)  Pulse: 95 (11/28/19 1146)  Resp: 15 (11/28/19 1146)  BP: (!) 112/56 (11/28/19 1146)  SpO2: 95 % (11/28/19 1146) Vital Signs (24h Range):  Temp:  [96.6 °F (35.9 °C)-97.9 °F (36.6 °C)] 97.6 °F (36.4 °C)  Pulse:  [] 95  Resp:  [15-18] 15  SpO2:  [95 %-97 %] 95 %  BP: (112-131)/(56-71) 112/56     Weight: 129.5 kg (285 lb 7.9 oz) (11/25/19 2322)  Body mass index is 36.66 kg/m².    Physical Exam   Constitutional: He is oriented to person, place, and time. He appears lethargic. He appears ill. No distress.   HENT:   Head: Normocephalic.   Eyes: Conjunctivae are normal. Scleral icterus is present.   Neck: Normal range of motion. Neck supple.   Cardiovascular: Normal rate and regular rhythm.   Pulmonary/Chest: Effort normal and breath sounds normal.   Abdominal: Soft. Bowel sounds are normal. He exhibits distension. He exhibits no mass. There is no tenderness. There is no guarding.   Musculoskeletal: Normal range of motion.   Neurological: He is oriented to person,  place, and time. He appears lethargic.   Skin: Skin is warm and dry.   Psychiatric: He has a normal mood and affect.       MELD-Na score: 34 at 11/28/2019  5:45 AM  MELD score: 32 at 11/28/2019  5:45 AM  Calculated from:  Serum Creatinine: 4.3 mg/dL (Rounded to 4 mg/dL) at 11/28/2019  5:45 AM  Serum Sodium: 130 mmol/L at 11/28/2019  5:45 AM  Total Bilirubin: 10.3 mg/dL at 11/28/2019  5:45 AM  INR(ratio): 1.4 at 11/28/2019  5:45 AM  Age: 34 years    Significant Labs:  CBC:   Recent Labs   Lab 11/28/19  0545   WBC 27.69*   RBC 2.41*   HGB 8.9*   HCT 27.3*   *     BMP:   Recent Labs   Lab 11/28/19  0545   *   *   K 4.5   CL 94*   CO2 24   BUN 73*   CREATININE 4.3*   CALCIUM 8.7     CMP:   Recent Labs   Lab 11/28/19  0545   *   CALCIUM 8.7   ALBUMIN 3.1*   PROT 5.5*   *   K 4.5   CO2 24   CL 94*   BUN 73*   CREATININE 4.3*   ALKPHOS 201*   ALT 36   *   BILITOT 10.3*       Significant Imaging:  Labs: Reviewed  US: Reviewed  CT: Reviewed

## 2019-11-28 NOTE — CONSULTS
[facility]  Nephrology  Consult Note    Patient Name: Luis Hurd  MRN: [id number]  Admission Date: 11/25/2019  Hospital Length of Stay: 3 days  Attending Provider: Mya Subramanian MD   Primary Care Physician: William Nettles MD  Principal Problem:Spontaneous bacterial peritonitis    Consults  Subjective:     HPI: 33 y/o man with hx of alcohol abuse, alcoholic cirrhosis decompensated with ascites,  morbid obesity, no history of CKD baseline sCr 0.7 (as of 11/20/2019), admitted on 11/25/2019 as a transfer from OSH for liver txp evaluation and [place], concern for HRS.  Originally presented on 11/20 to OSH with worsening abdominal pain and distention, along with increased lower extremity edema, and also diagnosed with SBP and pancreatitis.  Last drink ~1 month ago (started drinking ~15 yrs ago, with 2-3 wine bottles/day).  On admit, sCr 0.7, had paracentesis with ~6L removal, administered albumin post procedure, no report of acute hypotension during previous hospital stay, yet subsequently developed rise in sCr to 1.63 on 11/22, 2.39 on 11/23, 2.44 on 11/25, and labs on arrival to [facility] on 11/25 up to 3.1.  He was initiated in HRS treatment, receiving albumin, octreotide, and midodrine.  Ofr SBP started on ceftriaxone.      Nephrology consulted for evaluation of [place].      Interval History: Patient seen and examined at bedside, creatinine trending up, UOP was 600cc last shift.     Review of patient's allergies indicates:  No Known Allergies  Current Facility-Administered Medications   Medication Frequency    acetaminophen tablet 325 mg Q4H PRN    albumin human 25% bottle 25 g TID    dextrose 10% (D10W) Bolus PRN    dextrose 10% (D10W) Bolus PRN    fluconazole tablet 200 mg Daily    folic acid tablet 1 mg Daily    glucagon (human recombinant) injection 1 mg PRN    glucose chewable tablet 16 g PRN    glucose chewable tablet 24 g PRN    midodrine tablet 15 mg TID    octreotide injection 100  mcg Q8H    ondansetron tablet 8 mg Q6H PRN    piperacillin-tazobactam 4.5 g in sodium chloride 0.9% 100 mL IVPB (ready to mix system) Q8H    polyethylene glycol packet 17 g Daily    promethazine (PHENERGAN) 12.5 mg in dextrose 5 % 50 mL IVPB Q6H PRN    senna-docusate 8.6-50 mg per tablet 1 tablet Daily    sodium chloride 0.9% flush 10 mL PRN    thiamine tablet 100 mg Daily    traMADol tablet 50 mg Q6H PRN       Objective:     Vital Signs (Most Recent):  Temp: 97.6 °F (36.4 °C) (11/28/19 1146)  Pulse: 95 (11/28/19 1146)  Resp: 15 (11/28/19 1146)  BP: (!) 112/56 (11/28/19 1146)  SpO2: 95 % (11/28/19 1146)  O2 Device (Oxygen Therapy): room air (11/28/19 0000) Vital Signs (24h Range):  Temp:  [96.6 °F (35.9 °C)-97.9 °F (36.6 °C)] 97.6 °F (36.4 °C)  Pulse:  [] 95  Resp:  [15-18] 15  SpO2:  [95 %-97 %] 95 %  BP: (112-131)/(56-71) 112/56     Weight: 129.5 kg (285 lb 7.9 oz) (11/25/19 2322)  Body mass index is 36.66 kg/m².  Body surface area is 2.6 meters squared.    I/O last 3 completed shifts:  In: 2820 [P.O.:2220; I.V.:100; IV Piggyback:500]  Out: 930 [Urine:930]    Physical Exam   Constitutional: He is oriented to person, place, and time. He appears well-developed and well-nourished.   HENT:   Head: Normocephalic and atraumatic.   Eyes: Pupils are equal, round, and reactive to light. EOM are normal.   Neck: Normal range of motion. Neck supple.   Cardiovascular: Normal rate and regular rhythm.   Pulmonary/Chest: Effort normal. No respiratory distress. He has no wheezes.   Abdominal: Soft. He exhibits distension.   Musculoskeletal: Normal range of motion. He exhibits edema (low extremity pitting +2).   Neurological: He is alert and oriented to person, place, and time.   Skin: Skin is warm.   Nursing note and vitals reviewed.      Significant Labs:  CBC:   Recent Labs   Lab 11/28/19  0545   WBC 27.69*   RBC 2.41*   HGB 8.9*   HCT 27.3*   *   *   MCH 36.9*   MCHC 32.6     CMP:   Recent Labs   Lab  11/28/19  0545   *   CALCIUM 8.7   ALBUMIN 3.1*   PROT 5.5*   *   K 4.5   CO2 24   CL 94*   BUN 73*   CREATININE 4.3*   ALKPHOS 201*   ALT 36   *   BILITOT 10.3*     All labs within the past 24 hours have been reviewed.     Significant Imaging:  Labs: Reviewed    Assessment/Plan:     * Spontaneous bacterial peritonitis  Management per primary team, on IV antibiotics     MANAS (acute kidney injury)  Unclear at this time etiology of MANAS, but likely from HRS (hyponatremia, low/normal Bps) vs prolonged prerenal or volume depletion after paracentesis 6L removal at prior hospital  - Renal US, no significant abnormalities seen in kidneys  - Did Paracentesis     Plan:  - U/P Cr ratio: 0.23  - Urine sodium<20  - IR Paracentesis performed on 11/26, 4 liters removed  - Strict I/Os and chart  - Agree with HRS protocol; aim for BP MAP >85  - Hb > 7 gm/dL  - Avoid nephrotoxic agents, NSAIDs, IV contrast, etc  - Will follow closely      Hyponatremia  Likely from his liver failure and volume overload (hypervolemic hyponatremia)   Refer to [facility]    Decompensated hepatic cirrhosis  Per primary team  Liver team evaluation for txp  MELD-Na score: 34 at 11/28/2019  5:45 AM  MELD score: 32 at 11/28/2019  5:45 AM  Calculated from:  Serum Creatinine: 4.3 mg/dL (Rounded to 4 mg/dL) at 11/28/2019  5:45 AM  Serum Sodium: 130 mmol/L at 11/28/2019  5:45 AM  Total Bilirubin: 10.3 mg/dL at 11/28/2019  5:45 AM  INR(ratio): 1.4 at 11/28/2019  5:45 AM  Age: 34 years          Thank you for your consult. I will follow-up with patient. Please contact us if you have any additional questions.    Rito Laird MD  Nephrology  [facility]

## 2019-11-28 NOTE — PROGRESS NOTES
Progress Note  Hospital Medicine  [facility] [place]         Patient Name: Luis Hurd  MRN:  [id number]  Hospital Medicine Team: [facility] Mya Subramanian MD  Date of Admission:  11/25/2019     Length of Stay:  LOS: 3 days   Expected Discharge Date: 12/2/2019  Principal Problem:  Spontaneous bacterial peritonitis     Subjective:     Interval History/Overnight Events:    Doing well today, no acute changes.  Mental status at baseline.  Bowel regimen for constipation.  Creatinine increased to 4.3, Myers catheter in place.  Will removed today.  Midodrine dose increased.  Concern for untreated infection possibly driving further H RS / Tomas I.  Fluconazole p.o. added on top of Zosyn today.  Will likely need a paracentesis tomorrow for further investigation of SBP resolution.  Nephrology and hepatology following  Overall meld score 34.  WBC count decreased.  INR decreased down to 1.4.  However creatinine continues to worsen     albumin human 25%  25 g Intravenous TID    fluconazole  200 mg Oral Daily    folic acid  1 mg Oral Daily    midodrine  15 mg Oral TID    octreotide  100 mcg Subcutaneous Q8H    piperacillin-tazobactam (ZOSYN) IVPB  4.5 g Intravenous Q8H    polyethylene glycol  17 g Oral Daily    senna-docusate 8.6-50 mg  1 tablet Oral Daily    thiamine  100 mg Oral Daily           acetaminophen, Dextrose 10% Bolus, Dextrose 10% Bolus, glucagon (human recombinant), glucose, glucose, ondansetron, promethazine (PHENERGAN) IVPB, sodium chloride 0.9%, traMADol    Review of Systems   Constitutional: Negative for chills, fatigue, fever.   HENT: Negative for sore throat, trouble swallowing.    Eyes: Negative for photophobia, visual disturbance.   Respiratory: Negative for cough, shortness of breath.    Cardiovascular: Negative for chest pain, palpitations, leg swelling.   Gastrointestinal: Negative for abdominal pain, constipation, diarrhea, nausea, vomiting.   Endocrine: Negative  for cold intolerance, heat intolerance.   Genitourinary: Negative for dysuria, frequency.   Musculoskeletal: Negative for arthralgias, myalgias.   Skin: Negative for rash, wound, erythema   Neurological: Negative for dizziness, syncope, weakness, light-headedness.   Psychiatric/Behavioral: Negative for confusion, hallucinations, anxiety  All other systems reviewed and are negative.    Objective:     Temp:  [96.6 °F (35.9 °C)-97.9 °F (36.6 °C)]   Pulse:  []   Resp:  [15-18]   BP: (112-131)/(56-71)   SpO2:  [95 %-97 %]       Physical Exam:  Constitutional: appears older than stated age, chronically ill looking,  non-distressed, not diaphoretic.   HENT: NC/AT, external ears normal, oropharynx clear, MMM w/o exudates.   Eyes: PERRL, EOMI, conjunctiva normal, no discharge b/l, +scleral icterus   Neck: normal ROM, supple  CV: RRR, no m/r/g, no carotid bruits, +2 peripheral pulses.  Pulmonary/Chest wall: Breathing comfortably w/o distress, CTAB, no w/r/r, no crackles.    GI: Soft, non-tender, (+) BS, (+) BM   +distended,  Caput medusa present,  Obese abdomen  Musculoskeletal: Normal ROM, no atrophy,  +trace pitting edema in LE bilaterally   Neurological: AAO x 4, CN II-XI in tact, nl sensation, nl strength/tone  No asterixis   Skin: warm, dry   +pallor, jaundice, + spider angiomas, +bruising   Psych: normal mood and affect, normal behavior, thought content and judgement.    Labs:    Chemistries:   Recent Labs   Lab 11/26/19  1005 11/27/19  0457 11/28/19  0545   * 131* 130*   K 4.2 4.4 4.5   CL 96 95 94*   CO2 26 25 24   BUN 58* 64* 73*   CREATININE 3.4* 3.9* 4.3*   CALCIUM 8.1* 8.6* 8.7   PROT 5.2* 5.2* 5.5*   BILITOT 8.9* 8.9* 10.3*   ALKPHOS 197* 196* 201*   ALT 47* 39 36   * 114* 111*   MG 2.5 2.5 2.8*   PHOS 4.9* 4.3 5.2*        WBC:   Recent Labs   Lab 11/25/19  2207 11/26/19  1005 11/27/19  0457 11/28/19  0545   WBC 35.36* 25.97* 21.84* 27.69*     Bands:     CBC/Anemia Labs: Coags:    Recent Labs    Lab 11/26/19  1005 11/27/19  0457 11/28/19  0545   WBC 25.97* 21.84* 27.69*   HGB 10.1* 8.9* 8.9*   HCT 30.3* 27.6* 27.3*    109* 111*   * 111* 113*   RDW 13.9 13.7 13.8   IRON 36*  --   --    FERRITIN 2,782*  --   --    FOLATE 7.6  --   --    EPOQXLTS19 >2000*  --   --     Recent Labs   Lab 11/26/19  1005 11/27/19  0457 11/28/19  0545   INR 2.2* 1.5* 1.4*        Diagnostic Results:     Ref. Range 11/26/2019 07:45   Fluid Color Unknown Yellow   Fluid Appearance Unknown Cloudy   Body Fluid Type Unknown Ascites   WBC, Body Fluid Latest Units: /cu mm 855   Segs, Fluid Latest Units: % 49   Lymphs, Fluid Latest Units: % 13   Monocytes/Macrophages, Fluid Latest Units: % 37   Body Fluid Albumin Latest Ref Range: See text g/dL 1.1   Body Fluid Source, Albumin Unknown Ascites   Body Fluid Source, Total Protein Unknown Ascites   Body Fluid, Protein Latest Ref Range: Not established g/dL 2.1       Assessment and Plan     Hospital Course:    Mr. Luis Hurd was admitted to Hospital Medicine for management of SBP , ARF, and  Decompensated cirrhosis/ liver failure     Active Hospital Problems    Diagnosis  POA    *Spontaneous bacterial peritonitis [K65.2]  Yes    Acute liver failure without hepatic coma [K72.00]  Yes    Hepatorenal syndrome [K76.7]  Yes    Decompensated hepatic cirrhosis [K72.90]  Yes    Alcoholic cirrhosis of liver with ascites [K70.31]  Yes    Coagulopathy [D68.9]  Yes    Severe sepsis with acute organ dysfunction [A41.9, R65.20]  Yes    Anemia of chronic disease [D63.8]  Yes    Hyponatremia [E87.1]  Yes    Alcoholic hepatitis with ascites [K70.11]  Yes    Severe alcohol dependence [F10.20]  Yes    Acute pancreatitis [K85.90]  Yes    LORENE (acute kidney injury) [N17.9]  Yes    Macrocytic anemia [D53.9]  Yes    Severe protein-calorie malnutrition [E43]  Yes    Encounter for pre-transplant evaluation for liver transplant [Z01.818]  Not Applicable      Resolved Hospital Problems  "  No resolved problems to display.       Severe sepsis with organ dysfunction  SBP  - patient diagnosed on 11/21 and started on rocephin then switched to meropenem at OSH  - on 11/25, would be Day 5 of treatment, Zosyn  Started on presentation at [facility] on 11/25/2019   -  Patient underwent 4L paracentesis 11/26/2019, fluid analysis continues to show evidence of SBP with 850  WBCs in 49% segmented neutrophils.  WBC count in the periphery decreasing to 26 from 35.   -  Continue Zosyn.  Follow-up ascitic cultures  -  Follow-up blood cultures- NGTD  -  Continue albumin  -  MRCP on 11/26/2019 showed no pancreatic abscess,  Hepatic biliary obstruction. showing 1.4 cm pancreatic tail cyst  - will need repeat LVP/ diagnostic paracentesis on 11/29/2019 to ensure clearance of SBP  - p.o. fluconazole added on 11/28, given concern for worsening infection , given decompensating kidneys     Acute liver failure without hepatic coma  Decompensated alcohol cirrhosis with ascites  Alcohol hepatitis with ascites  Severe alcohol dependence   MELD-Na score: 34 at 11/28/2019  5:45 AM  MELD score: 32 at 11/28/2019  5:45 AM  Calculated from:  Serum Creatinine: 4.3 mg/dL (Rounded to 4 mg/dL) at 11/28/2019  5:45 AM  Serum Sodium: 130 mmol/L at 11/28/2019  5:45 AM  Total Bilirubin: 10.3 mg/dL at 11/28/2019  5:45 AM  INR(ratio): 1.4 at 11/28/2019  5:45 AM  Age: 34 years     - patient's last drink about a month ago  Prior to presentation  - viral studies OSH largely negative  - thiamine, folate   - hepatology consulted  - addiction psych consulted   - PETH pending; urine tox + for opioids   - U/S liver with doppler shows "Findings consistent with cirrhosis and sequela of portal hypertension including a large volume of ascites, splenomegaly, and collateral vessel formation"   -  Treat SBP as above  -  Treat renal dysfunction as below  - obtained financial approval from insurance company for initiation of liver transplant eval- serologies " ordered for 11/28 AM     LORENE  Hepatorenal Syndrome  - was being treated for HRS at OSH with rise in Cr with SBP. atient also had IV contrast and had large volume paracentesis and does not appear to have gotten albumin after  -  Creatinine 3.1 on presentation at [facility]  - urine studies- review concerning for H RS  - cont albumin and octreotide   - nephrology consulted  - midodrine started on 11/27, in attempt to hyper perfuse kidneys   - treat SBP as above     Acute pancreatitis   - due to alcohol   - lipase improved from OSH;  Lipase here 540-->453  - CLD--> advance diet on 11/26  - MRCP 11/26- showed 1.4 cm lesion in the tail of the pancreas as described above.  Considerations include a pancreatic cyst, small pancreatic pseudocysts, or cystic neoplasm. No acute pancreatitis      Coagulopathy due to liver disease  -  Likely due to cirrhosis/  Current decompensation, liver failure  -  fibrinogen 262  - vitamin K for 3 days .  INR trended down to 1.4 with IV vitamin K     Hyponatremia  - fluid restrict . Na 132     Macrocytic anemia  Anemia of chronic disease  - hemoglobin 11-10, . Likely due to alcoholism and liver disease  -  Hemolysis labs reviewed, show some evidence of hemolysis  -  iron studies, ferritin and folate-  Showed evidence of anemia of chronic disease  -  Continue folate     Obesity   Severe protein-calorie malnutrition  Body mass index is 36.66 kg/m²  On admission --> Body mass index is 36.66 kg/m².  -  Albumin 2.4, pre-albumin 4. Likely due to alcoholism/  Cirrhosis,  In setting of acute decompensation  -  Advancing diet as tolerated  -  Boost  -  Dietary consult    Diet:  Low Na with fluid restriction   GI PPx:  PO PPI  DVT PPx:  SCDs due to coagulopathy   Goals of Care:  Full     High Risk Conditions:  longterm, SBP    Disposition:    TBD.  Undergoing liver transplant evaluation.  Likely week of 12/2      Patient's note was created using MModal Dictation.  Any errors in syntax may not have been  identified and edited on initial review prior to signing this note.    Signing Physician:     Mya Subramanian MD  Department of [facility]   [facility]  Pager [phone] [place] [phone]  11/28/2019

## 2019-11-28 NOTE — NURSING
Writer notified MD about patient's low urine output and wanting clarification about whether they want the patient on a fluid restriction. MD stated that he would look through the patient's chart and determine whether he wanted to place any new orders on the patient. Noc nurse was made aware.

## 2019-11-28 NOTE — CONSULTS
"  [facility]  Adult Nutrition  Consult Note    SUMMARY     Recommendations    Recommendation/Intervention:   1.) Suggest renal (predialysis) diet.   2.) Suggest Boost Breeze BID.   3.) Suggest MVI.   4.) Daily weights.   5.) Update hand  strength.     Goals: 1.) Pt to consume/tolerate >75% EEN and EPN by follow up.   Nutrition Goal Status: new  Communication of RD Recs: (POC)    Reason for Assessment    Reason For Assessment: consult(OLTx workup)  Diagnosis: other (see comments)(spontaneous bacterial pertonitis)  Relevant Medical History: ETOH abuse, alcoholic cirrhosis with ascites, pacreatitis  Interdisciplinary Rounds: did not attend  General Information Comments: Pt sitting up with bkfst tray to bedside. Pt reports full feeling with poor appetite for the past 5 days. Pt denies heart burn, or ac id indigestion. +Nausea with occasional emesis. C/o constipation. Observed Boost Breeze to bedside untouched. Encouraged pt to consume since PO intake of meals <50%. Pt agreed. # reflecting 25# wt gain. Pt reports unsure when the last time he weighed 260#, but states "a few years." Pt reports consuming lean meats (fish), vegetables, fruit on a daily basis. He does use salt to season foods. Provided low Na diet education. NFPE completed at bedside with mild/moderate wasting observed, moderate edema to abdomen. Due to poor PO intake ~5 days, edema, and moderate wasting, pt meets ASPEN criteria for acute malnutrition. OLTx workup complete.   Nutrition Discharge Planning: Low Na diet education provided. Emphasized lean meats, whole grains, and portion control. All questions answered. Handouts provided with RD contact information for questions/concerns.     Nutrition Risk Screen    Nutrition Risk Screen: no indicators present    Nutrition/Diet History    Spiritual, Cultural Beliefs, Restoration Practices, Values that Affect Care: no    Anthropometrics    Temp: 97.5 °F (36.4 °C)  Height Method: " "Stated  Height: 6' 2" (188 cm)  Height (inches): 74 in  Weight Method: Bed Scale  Weight: 129.5 kg (285 lb 7.9 oz)  Weight (lb): 285.5 lb  Ideal Body Weight (IBW), Male: 190 lb  % Ideal Body Weight, Male (lb): 150.26 lb  BMI (Calculated): 36.6  BMI Grade: 35 - 39.9 - obesity - grade II  Usual Body Weight (UBW), k.1 kg(over the past few years)  % Usual Body Weight: 109.88       Lab/Procedures/Meds    Pertinent Labs Reviewed: reviewed  Pertinent Labs Comments: H/H 8.9/27.3, Na 130, Chl 94, BUN 73, Cr 4.3, GFR 19.4, Glu 117, Phos 5.2, Mg 2.8, , , bili 10.3  Pertinent Medications Reviewed: reviewed  Pertinent Medications Comments: zosyn, miralax, senna-docusate, thiamine    Estimated/Assessed Needs    Weight Used For Calorie Calculations: 129.5 kg (285 lb 7.9 oz)  Energy Calorie Requirements (kcal): 2305  Energy Need Method: [place]-St Jeor(no AF)  Protein Requirements: 104-130(g/day)  Weight Used For Protein Calculations: 86.3 kg (190 lb 4.1 oz)(1.2-1.5 g/kg of IBW)     Estimated Fluid Requirement Method: RDA Method(or per MD)  RDA Method (mL): 2305       Nutrition Prescription Ordered    Current Diet Order: low Na, 2gm    Evaluation of Received Nutrient/Fluid Intake    I/O: +1.9L since admit  Comments: LBM 11/21  % Intake of Estimated Energy Needs: 0 - 25 %  % Meal Intake: 0 - 25 %    Nutrition Risk    Level of Risk/Frequency of Follow-up: moderate     Assessment and Plan    Moderate protein-calorie malnutrition   Malnutrition in the context of Acute Illness/Injury    Related to (etiology):  ETOH cirrhosis with ascites    Signs and Symptoms (as evidenced by):  Energy Intake: <50% of estimated energy requirement for >5 days  Body Fat Depletion: mild depletion of orbitals, triceps and thoracic and lumbar region   Muscle Mass Depletion: mild/moderate depletion of temples, clavicle region, scapular region, interosseous muscle and lower extremities   Fluid Accumulation: moderate to " abdomen    Interventions/Recommendations (treatment strategy):  Collaboration of nutrition care with other providers  Referral of care  Modified diet-renal (predialysis)  Supplemental beverage-Boost Breeze  Vitamin-MVI  Diet education-Low Na    Nutrition Diagnosis Status:  New         Monitor and Evaluation    Food and Nutrient Intake: energy intake, food and beverage intake  Food and Nutrient Adminstration: diet order  Knowledge/Beliefs/Attitudes: food and nutrition knowledge/skill  Physical Activity and Function: nutrition-related ADLs and IADLs  Anthropometric Measurements: weight, weight change, body mass index  Biochemical Data, Medical Tests and Procedures: electrolyte and renal panel, gastrointestinal profile, glucose/endocrine profile, lipid profile  Nutrition-Focused Physical Findings: overall appearance     Malnutrition Assessment  Energy Intake: moderate energy intake          Energy Intake (Malnutrition): less than or equal to 50% for greater than or equal to 5 days   Orbital Region (Subcutaneous Fat Loss): well nourished  Upper Arm Region (Subcutaneous Fat Loss): mild depletion   Religion Region (Muscle Loss): moderate depletion  Clavicle Bone Region (Muscle Loss): mild depletion  Clavicle and Acromion Bone Region (Muscle Loss): mild depletion  Dorsal Hand (Muscle Loss): mild depletion  Anterior Thigh Region (Muscle Loss): well nourished  Posterior Calf Region (Muscle Loss): well nourished   Edema (Fluid Accumulation): 3-->moderate(abdomen)   Subcutaneous Fat Loss (Final Summary): mild protein-calorie malnutrition  Muscle Loss Evaluation (Final Summary): mild protein-calorie malnutrition  Fluid Accumulation Evaluation: moderate         Nutrition Follow-Up    RD Follow-up?: Yes

## 2019-11-28 NOTE — PLAN OF CARE
No significant events or changes since pt arrived on floor/transferred around 2245 last night, care clustered to promote sleep, IV abt running with no complications noted, am labs drawn via midline with no site complications noted, minimal needs expressed overnight, prn pain med given x1, vitals stable, noriega catheter remains in place for accurate urinary measurement (per report given), call light in reach, will continue to monitor

## 2019-11-29 PROBLEM — N17.0 ACUTE RENAL FAILURE WITH TUBULAR NECROSIS: Status: ACTIVE | Noted: 2019-01-01

## 2019-11-29 NOTE — SUBJECTIVE & OBJECTIVE
Interval History: Doing well today. Has nausea with food without emesis. IR paracentesis will be done today. Creatinine worsening today up to 5.  Patient with minimal urine output.   WBC count 40.  Patient has been on Zosyn and fluconazole.     Review of patient's allergies indicates:  No Known Allergies  Current Facility-Administered Medications   Medication Frequency    acetaminophen tablet 325 mg Q4H PRN    albumin human 25% bottle 25 g TID    bisacodyl suppository 10 mg Daily PRN    dextrose 10% (D10W) Bolus PRN    dextrose 10% (D10W) Bolus PRN    [START ON 11/30/2019] fluconazole tablet 100 mg Daily    folic acid tablet 1 mg Daily    glucagon (human recombinant) injection 1 mg PRN    glucose chewable tablet 16 g PRN    glucose chewable tablet 24 g PRN    midodrine tablet 15 mg TID    octreotide injection 100 mcg Q8H    ondansetron tablet 8 mg Q6H PRN    piperacillin-tazobactam 4.5 g in sodium chloride 0.9% 100 mL IVPB (ready to mix system) Q8H    polyethylene glycol packet 17 g Daily    promethazine (PHENERGAN) 12.5 mg in dextrose 5 % 50 mL IVPB Q6H PRN    senna-docusate 8.6-50 mg per tablet 1 tablet Daily    sevelamer carbonate tablet 800 mg TID WM    sodium chloride 0.9% flush 10 mL PRN    thiamine tablet 100 mg Daily    traMADol tablet 50 mg Q6H PRN       Objective:     Vital Signs (Most Recent):  Temp: 97.5 °F (36.4 °C) (11/29/19 1146)  Pulse: 87 (11/29/19 1146)  Resp: 18 (11/29/19 1146)  BP: 130/75 (11/29/19 1146)  SpO2: (!) 94 % (11/29/19 1146)  O2 Device (Oxygen Therapy): room air (11/28/19 0000) Vital Signs (24h Range):  Temp:  [97.5 °F (36.4 °C)-98.1 °F (36.7 °C)] 97.5 °F (36.4 °C)  Pulse:  [87-96] 87  Resp:  [16-18] 18  SpO2:  [94 %-96 %] 94 %  BP: (117-130)/(64-75) 130/75     Weight: 129.5 kg (285 lb 7.9 oz) (11/25/19 9476)  Body mass index is 36.66 kg/m².  Body surface area is 2.6 meters squared.    I/O last 3 completed shifts:  In: 1980 [P.O.:1280; I.V.:200; IV  Piggyback:500]  Out: 750 [Urine:750]    Physical Exam   Constitutional: He is oriented to person, place, and time. He appears ill. No distress.   HENT:   Head: Normocephalic.   Eyes: Conjunctivae are normal. Scleral icterus is present.   Neck: Normal range of motion. Neck supple.   Cardiovascular: Normal rate and regular rhythm.   Pulmonary/Chest: Effort normal and breath sounds normal.   Abdominal: Soft. Bowel sounds are normal. He exhibits distension. He exhibits no mass. There is no tenderness. There is no guarding.   Musculoskeletal: Normal range of motion.   Neurological: He is oriented to person, place, and time.   Skin: Skin is warm and dry.   Psychiatric: He has a normal mood and affect.       Significant Labs:  All labs within the past 24 hours have been reviewed.

## 2019-11-29 NOTE — PROGRESS NOTES
[facility]  Nephrology  Progress Note    Patient Name: Luis Hurd  MRN: [id number]  Admission Date: 11/25/2019  Hospital Length of Stay: 4 days  Attending Provider: Mya Subramanian MD   Primary Care Physician: William Nettles MD  Principal Problem:Spontaneous bacterial peritonitis    Subjective:     HPI: 35 y/o man with hx of alcohol abuse, alcoholic cirrhosis decompensated with ascites,  morbid obesity, no history of CKD baseline sCr 0.7 (as of 11/20/2019), admitted on 11/25/2019 as a transfer from OSH for liver txp evaluation and LORENE, concern for HRS.  Originally presented on 11/20 to OSH with worsening abdominal pain and distention, along with increased lower extremity edema, and also diagnosed with SBP and pancreatitis.  Last drink ~1 month ago (started drinking ~15 yrs ago, with 2-3 wine bottles/day).  On admit, sCr 0.7, had paracentesis with ~6L removal, administered albumin post procedure, no report of acute hypotension during previous hospital stay, yet subsequently developed rise in sCr to 1.63 on 11/22, 2.39 on 11/23, 2.44 on 11/25, and labs on arrival to C on 11/25 up to 3.1.  He was initiated in HRS treatment, receiving albumin, octreotide, and midodrine.  Ofr SBP started on ceftriaxone.      Nephrology consulted for evaluation of Lorene.      Interval History: Doing well today. Has nausea with food without emesis. IR paracentesis will be done today. Creatinine worsening today up to 5.  Patient with minimal urine output.   WBC count 40.  Patient has been on Zosyn and fluconazole.     Review of patient's allergies indicates:  No Known Allergies  Current Facility-Administered Medications   Medication Frequency    acetaminophen tablet 325 mg Q4H PRN    albumin human 25% bottle 25 g TID    bisacodyl suppository 10 mg Daily PRN    dextrose 10% (D10W) Bolus PRN    dextrose 10% (D10W) Bolus PRN    [START ON 11/30/2019] fluconazole tablet 100 mg Daily    folic acid tablet 1 mg  Daily    glucagon (human recombinant) injection 1 mg PRN    glucose chewable tablet 16 g PRN    glucose chewable tablet 24 g PRN    midodrine tablet 15 mg TID    octreotide injection 100 mcg Q8H    ondansetron tablet 8 mg Q6H PRN    piperacillin-tazobactam 4.5 g in sodium chloride 0.9% 100 mL IVPB (ready to mix system) Q8H    polyethylene glycol packet 17 g Daily    promethazine (PHENERGAN) 12.5 mg in dextrose 5 % 50 mL IVPB Q6H PRN    senna-docusate 8.6-50 mg per tablet 1 tablet Daily    sevelamer carbonate tablet 800 mg TID WM    sodium chloride 0.9% flush 10 mL PRN    thiamine tablet 100 mg Daily    traMADol tablet 50 mg Q6H PRN       Objective:     Vital Signs (Most Recent):  Temp: 97.5 °F (36.4 °C) (11/29/19 1146)  Pulse: 87 (11/29/19 1146)  Resp: 18 (11/29/19 1146)  BP: 130/75 (11/29/19 1146)  SpO2: (!) 94 % (11/29/19 1146)  O2 Device (Oxygen Therapy): room air (11/28/19 0000) Vital Signs (24h Range):  Temp:  [97.5 °F (36.4 °C)-98.1 °F (36.7 °C)] 97.5 °F (36.4 °C)  Pulse:  [87-96] 87  Resp:  [16-18] 18  SpO2:  [94 %-96 %] 94 %  BP: (117-130)/(64-75) 130/75     Weight: 129.5 kg (285 lb 7.9 oz) (11/25/19 2322)  Body mass index is 36.66 kg/m².  Body surface area is 2.6 meters squared.    I/O last 3 completed shifts:  In: 1980 [P.O.:1280; I.V.:200; IV Piggyback:500]  Out: 750 [Urine:750]    Physical Exam   Constitutional: He is oriented to person, place, and time. He appears ill. No distress.   HENT:   Head: Normocephalic.   Eyes: Conjunctivae are normal. Scleral icterus is present.   Neck: Normal range of motion. Neck supple.   Cardiovascular: Normal rate and regular rhythm.   Pulmonary/Chest: Effort normal and breath sounds normal.   Abdominal: Soft. Bowel sounds are normal. He exhibits distension. He exhibits no mass. There is no tenderness. There is no guarding.   Musculoskeletal: Normal range of motion.   Neurological: He is oriented to person, place, and time.   Skin: Skin is warm and dry.    Psychiatric: He has a normal mood and affect.       Significant Labs:  All labs within the past 24 hours have been reviewed.         Assessment/Plan:     LORENE (acute kidney injury)  Unclear at this time etiology of LORENE, but likely from HRS (hyponatremia, low/normal Bps) vs prolonged prerenal or volume depletion after paracentesis 6L removal at prior hospital  - Renal US, no significant abnormalities seen in kidneys  - U/P Cr ratio: 0.23; urine sodium<20  - Cr is worsening. UOP decreasing    Plan:  - no need for RRt today. Will likely need it soon.  - IR Paracentesis performed on 11/26, 4 liters removed. Will have another paracentesis today.  - Strict I/Os and chart  - Agree with HRS protocol; aim for BP MAP >85  - Hb > 7 gm/dL  - Avoid nephrotoxic agents, NSAIDs, IV contrast, etc  - Will follow closely      Decompensated hepatic cirrhosis  Per primary team  Liver team evaluation for txp  MELD-Na score: 34 at 11/29/2019  6:37 AM  MELD score: 32 at 11/29/2019  6:37 AM  Calculated from:  Serum Creatinine: 5.0 mg/dL (Rounded to 4 mg/dL) at 11/29/2019  6:37 AM  Serum Sodium: 130 mmol/L at 11/29/2019  6:37 AM  Total Bilirubin: 10.6 mg/dL at 11/29/2019  6:37 AM  INR(ratio): 1.4 at 11/29/2019  6:37 AM  Age: 34 years          Thank you for your consult. I will follow-up with patient. Please contact us if you have any additional questions.    Danni Marquez MD  Nephrology  [facility]    ATTENDING PHYSICIAN ATTESTATION  I have personally verified the history and examined the patient. I thoroughly reviewed the demographic, clinical, laboratorial and imaging information available in medical records. I agree with the assessment and recommendations provided by the subspecialty resident who was under my supervision.

## 2019-11-29 NOTE — PLAN OF CARE
Received call from patient's floor nurse stating that patient's father upset currently stating that patient's insurance that is on file is incorrect.  CM sent message to admitting for staff to pass by patient's room to discuss with father and place corrected information in chart if needed.

## 2019-11-29 NOTE — PLAN OF CARE
Rested comfortably most of night, VSS, NAD. Abd large, round, firm, distended. C/o pain, prn pain meds given as ordered.

## 2019-11-29 NOTE — PROGRESS NOTES
Transplant Recipient Adult Psychosocial Assessment    Luis Hurd  [address]  [place] [address]  Telephone Information:   Mobile [phone]   Home  [phone] (home)  Work  There is no work phone number on file.  E-mail  No e-mail address on record    Sex: male  YOB: 1985  Age: 34 y.o.    Encounter Date: 11/25/2019  [place] Citizen: yes  Primary Language: English   Needed: no    Emergency Contact:  Name: Irwin Hurd  Relationship: father  Address: [address] [place] [id number]  Phone Numbers:   #[phone] (mobile)    Family/Social Support:   Number of dependents/: none  Marital history: none  Other family dynamics: Patient reported having one brother. Patient's parents are alive and well and will assist as caregivers.     Household Composition:  Patient currently lives in home alone but will be moving with parents.     Do you and your caregivers have access to reliable transportation? yes     PRIMARY CAREGIVER: Lindsay Hurd (67yo) will be primary caregiver, phone number [phone].      provided in-depth information to patient and caregiver regarding pre- and post-transplant caregiver role.   strongly encourages patient and caregiver to have concrete plan regarding post-transplant care giving, including back-up caregiver(s) to ensure care giving needs are met as needed.    Patient and Caregiver states understanding all aspects of caregiver role/commitment and is able/willing/committed to being caregiver to the fullest extent necessary.    Patient and Caregiver verbalizes understanding of the education provided today and caregiver responsibilities.         remains available. Patient and Caregiver agree to contact  in a timely manner if concerns arise.      Able to take time off work without financial concerns: yes.  of [employer]. Caregiver reported that she is able to take time off  from work.     Additional Significant Others who will Assist with Transplant:  Name: Irwin Hurd #[phone]  Age: 66  City: [place] State: [place]  Relationship: father  Does person drive? yes   Caregiver works as . Has class on opposite days of spouse. Present and confirmed    Name: George Hurd ph#[phone]  Age: 30  City: [place] State: [place]  Relationship: brother  Does person drive? yes   Caregiver works as an ; however, will not be able to assist during the week.     Living Will: yes  Healthcare Power of : yes  Advance Directives on file: <<no information> per medical record.  Verbally reviewed LW/HCPA information.   provided patient with copy of LW/HCPA documents and provided education on completion of forms.    Living Donors: No.    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 Patient has 60 hr leave. [employer] insurance until  and then will apply for crisis leave (employees provide time). Caregivers to work on long term plan.    Spouse/Significant Other Employment: n/a    Disabled: no    Monthly Income:  Salary/Wages: $2400  Able to afford all costs now and if transplanted, including medications: yes  Patient and Caregiver verbalizes understanding of personal responsibilities related to transplant costs and the importance of having a financial plan to ensure that patients transplant costs are fully covered.      provided fundraising information/education.  Patient and Caregiver verbalizes understanding.   remains available.    Insurance:   Payor/Plan Subscr  Sex Relation  Sub. Ins. ID Effective Group Num   1. MEDICAID - HE* [employer] [place] 1985 Male  [id number] 1/1/19 [id number]                                   [address] [address]     Primary Insurance (for UNOS reporting): Private Insurance-[employer] First.  Patient has 60 hr leave. [employer] first insurance until Jan and then will apply for crisis leave (employees provide time). Caregivers to work on long term plan  Secondary Insurance (for UNOS reporting): None     Patient and Caregiver verbalizes clear understanding that patient may experience difficulty obtaining and/or be denied insurance coverage post-surgery. This includes and is not limited to disability insurance, life insurance, health insurance, burial insurance, long term care insurance, and other insurances.    Patient and Caregiver also reports understanding that future health concerns related to or unrelated to transplantation may not be covered by patient's insurance.  Resources and information provided and reviewed.      Patient and Caregiver provides verbal permission to release any necessary information to outside resources for patient care and discharge planning.  Resources and information provided are reviewed.      Dialysis Adherence:  Patient and Caregiver reports that patient does not require HD.      Infusion Service: patient utilizing? no  Home Health: patient utilizing? yes ; [facility] for PT  DME: no  Pulmonary/Cardiac Rehab: none   ADLS:  patie driving and reported having issues ambulating when he is fluid overload from ascites.     Adherence: Patient and caregivers reported that patient has been compliant with medical recommendations and regimens.  Adherence education and counseling provided.     Per History Section:  Past Medical History:   Diagnosis Date    Acute pancreatitis 11/26/2019    Alcoholic cirrhosis of liver with ascites 11/26/2019    Severe alcohol dependence 11/26/2019    Spontaneous bacterial peritonitis 11/25/2019     Social History      Tobacco Use    Smoking status: Never Smoker    Smokeless tobacco: Never Used   Substance Use Topics    Alcohol use: Not on file     Social History     Substance and Sexual Activity   Drug Use Not on file     Social History     Substance and Sexual Activity   Sexual Activity Not on file       Per Today's Psychosocial:  Tobacco: none, patient denies any use. Patient reported last smoking cigarettes in 2015. Patient quit on his own  Alcohol: Patient reported last ETOH use four weeks ago. Patient reported drinking two to three bottles of wine a night . Patient stated that he would drink ETOH to relax. Per patient, it was nice to be able to come home after a long day of work, and drink his wine. Patient reported that he was sober for about three months in 2019 and relapsed once he moved out of his parents home. Patient denied being triggered by anything. Patient denied having any urges at this time and agreeable to IOP. SW provided resources for IOP near the patient's home.  provided the patient with the following; [facility], [facility], and [facility].   Illicit Drugs/Non-prescribed Medications: none, patient denies any use.    Patient and Caregiver states clear understanding of the potential impact of substance use as it relates to transplant candidacy and is aware of possible random substance screening.  Substance abstinence/cessation counseling, education and resources provided and reviewed.     Arrests/DWI/Treatment/Rehab: yes. Patient reported receiving DWI in 2012. Patient reported having to pay a fine.     Psychiatric History:    Mental Health: pt reported having mild depression in the past. Denied having any current feelings of depression  Psychiatrist/Counselor: patient denied  Medications:  Pt reported taking Wellbutrin for about three months and reported that he did not feel that it worked for him  Suicide/Homicide Issues: patient denied past or current SI/HI  Safety  at home: patient reported living in a safe and appropriate home environment     Knowledge: Patient and Caregiver states having clear understanding and realistic expectations regarding the potential risks and potential benefits of organ transplantation and organ donation, agrees to discuss with health care team members and support system members and to utilize available resources and express questions and/or concerns in order to further facilitate the pt informed decision-making.  Resources and information provided and reviewed.     Patient and Caregiver is aware of [facility]'s affiliation and/or partnership with agencies in home health care, LTAC, SNF, [facility], and other hospitals and clinics.    Understanding: Patient and Caregiver reports having a clear understanding of the many lifetime commitments involved with being a transplant recipient, including costs, compliance, medications, lab work, procedures, appointments, concrete and financial planning, preparedness, timely and appropriate communication of concerns, abstinence (ETOH, tobacco, illicit non-prescribed drugs), adherence to all health care team recommendations, support system and caregiver involvement, appropriate and timely resource utilization and follow-through, mental health counseling as needed/recommended, and patient and caregiver responsibilities.  Social Service Handbook, resources and detailed educational information provided and reviewed.  Educational information provided.    Patient and Caregiver also reports current and expected compliance with health care regime and states having a clear understanding of the importance of compliance.      Patient and Caregiver reports a clear understanding that risks and benefits may be involved with organ transplantation and with organ donation.      Patient and Caregiver also reports clear understanding that psychosocial risk factors may affect patient, and include but are not limited to feelings of depression,  generalized anxiety, anxiety regarding dependence on others, post traumatic stress disorder, feelings of guilt and other emotional and/or mental concerns, and/or exacerbation of existing mental health concerns.  Detailed resources provided and discussed.     Patient and Caregiver agrees to access appropriate resources in a timely manner as needed and/or as recommended, and to communicate concerns appropriately.  Patient and Caregiver also reports a clear understanding of treatment options available.      reviewed education, provided additional information, and answered questions.    Feelings or Concerns: patient denied having feelings or concerns related to transplant.     Coping: Identify Patient & Caregiver Strategies to [place]:   1. Currently & Pre-transplant - patient reported that his family has been a major support for him and he has been using them as a means of coping. Pt also reported using music and television to escape.   2. At the time of surgery - patient reported that his family has been a major support for him and he has been using them as a means of coping. Pt also reported using music and television to escape.   3. During post-Transplant & Recovery Period - patient reported that his family has been a major support for him and he has been using them as a means of coping. Pt also reported using music and television to escape.    Goals: want to get through.  Patient referred to Vocational Rehabilitation.    Interview Behavior: Patient and Caregiver presents as alert and oriented x 4, pleasant, good eye contact, well groomed, recall good, concentration/judgement good, average intelligence, calm, communicative, cooperative and asking and answering questions appropriately.          Transplant Social Work - Candidacy  Assessment/Plan:     Psychosocial Suitability: Patient presents as a marginal candidate for liver transplant at this time. Based on psychosocial risk factors, patient presents as  high risk, due to last ETOH use being a month ago. Patient denied having any urges at this time and agreeable to IOP. SW provided resources for IOP near the patient's home. SW provided the patient with the following; [facility], [place], and [facility]. Protective factors include; adequate income and insurance, and good support with stable committed relationships and caregiver(s) able to provide assistance.     Recommendations/Additional Comments:   -Follow addiction psychiatry recommendations.    -Re-present once patient achieve 6 months of sobriety.  -Immediate enrollment in substance abuse program with pt's agreement to sign a REJI at agency he chooses so that SW can access records and communicate with providers. If health declines SW to re-evaluate before 6 months achieved. (SW provided resources.)   -Engaged in aftercare following completion of IOP.   -Serial PETH and toxicology screens  -[facility]  -twyla Barillas LMSW

## 2019-11-29 NOTE — PLAN OF CARE
Vitals stable. Pt sleeping off and on most of the day. Respirations even/unlabored on room air. Patient and family updated on today's plan of care and procedures scheduled. Encouraged patient to eat, very poor appetite. Continue to monitor, NAD

## 2019-11-29 NOTE — PROGRESS NOTES
Progress Note  Hospital Medicine  [facility] [place]         Patient Name: Luis Hurd  MRN:  [id number]  Hospital Medicine Team: [facility] MED L Mya Subramanian MD  Date of Admission:  11/25/2019     Length of Stay:  LOS: 4 days   Expected Discharge Date: 12/4/2019  Principal Problem:  Spontaneous bacterial peritonitis     Subjective:     Interval History/Overnight Events:    Doing well today,Mental status at baseline.  Has massive ascites and abdominal girth and distention from that  Plan for IR paracentesis today.  Also need to investigate resolution of SBP.  Creatinine worsening today up to 5.  WBC count 40.  Patient has been on Zosyn and fluconazole.  repeating infectious studies today  Nephrology is following.  Patient with minimal urine output.  Has been on H RS medications     albumin human 25%  25 g Intravenous TID    [START ON 11/30/2019] fluconazole  100 mg Oral Daily    folic acid  1 mg Oral Daily    midodrine  15 mg Oral TID    octreotide  100 mcg Subcutaneous Q8H    piperacillin-tazobactam (ZOSYN) IVPB  4.5 g Intravenous Q8H    polyethylene glycol  17 g Oral Daily    senna-docusate 8.6-50 mg  1 tablet Oral Daily    sevelamer carbonate  800 mg Oral TID WM    thiamine  100 mg Oral Daily           acetaminophen, bisacodyl, Dextrose 10% Bolus, Dextrose 10% Bolus, glucagon (human recombinant), glucose, glucose, ondansetron, promethazine (PHENERGAN) IVPB, sodium chloride 0.9%, traMADol    Review of Systems   Constitutional: Negative for chills, fatigue, fever.   HENT: Negative for sore throat, trouble swallowing.    Eyes: Negative for photophobia, visual disturbance.   Respiratory: Negative for cough, shortness of breath.    Cardiovascular: Negative for chest pain, palpitations, leg swelling.   Gastrointestinal: Negative for abdominal pain, constipation, diarrhea, nausea, vomiting.   Endocrine: Negative for cold intolerance, heat intolerance.   Genitourinary: Negative  for dysuria, frequency.   Musculoskeletal: Negative for arthralgias, myalgias.   Skin: Negative for rash, wound, erythema   Neurological: Negative for dizziness, syncope, weakness, light-headedness.   Psychiatric/Behavioral: Negative for confusion, hallucinations, anxiety  All other systems reviewed and are negative.    Objective:     Temp:  [97.5 °F (36.4 °C)-98.1 °F (36.7 °C)]   Pulse:  [87-96]   Resp:  [16-18]   BP: (117-130)/(64-75)   SpO2:  [94 %-96 %]       Physical Exam:  Constitutional: appears older than stated age, chronically ill looking,  non-distressed, not diaphoretic.   HENT: NC/AT, external ears normal, oropharynx clear, MMM w/o exudates.   Eyes: PERRL, EOMI, conjunctiva normal, no discharge b/l, +scleral icterus   Neck: normal ROM, supple  CV: RRR, no m/r/g, no carotid bruits, +2 peripheral pulses.  Pulmonary/Chest wall: Breathing comfortably w/o distress, CTAB, no w/r/r, no crackles.    GI: Soft, non-tender, (+) BS, (+) BM   +distended,  Caput medusa present,  Obese abdomen  Musculoskeletal: Normal ROM, no atrophy,  +trace pitting edema in LE bilaterally   Neurological: AAO x 4, CN II-XI in tact, nl sensation, nl strength/tone  No asterixis   Skin: warm, dry   +pallor, jaundice, + spider angiomas, +bruising   Psych: normal mood and affect, normal behavior, thought content and judgement.    Labs:    Chemistries:   Recent Labs   Lab 11/27/19  0457 11/28/19  0545 11/29/19  0637   * 130* 130*   K 4.4 4.5 4.5   CL 95 94* 94*   CO2 25 24 23   BUN 64* 73* 87*   CREATININE 3.9* 4.3* 5.0*   CALCIUM 8.6* 8.7 8.7   PROT 5.2* 5.5* 5.2*   BILITOT 8.9* 10.3* 10.6*   ALKPHOS 196* 201* 209*   ALT 39 36 36   * 111* 125*   MG 2.5 2.8* 3.1*   PHOS 4.3 5.2* 6.3*        WBC:   Recent Labs   Lab 11/25/19  2207 11/26/19  1005 11/27/19  0457 11/28/19  0545 11/29/19  0637   WBC 35.36* 25.97* 21.84* 27.69* 40.97*     Bands:     CBC/Anemia Labs: Coags:    Recent Labs   Lab 11/26/19  1005 11/27/19  0457  11/28/19  0545 11/29/19  0637   WBC 25.97* 21.84* 27.69* 40.97*   HGB 10.1* 8.9* 8.9* 8.7*   HCT 30.3* 27.6* 27.3* 26.8*    109* 111* 126*   * 111* 113* 113*   RDW 13.9 13.7 13.8 14.1   IRON 36*  --   --   --    FERRITIN 2,782*  --   --   --    FOLATE 7.6  --   --   --    BVSPGLFL91 >2000*  --   --   --     Recent Labs   Lab 11/27/19  0457 11/28/19  0545 11/29/19  0637   INR 1.5* 1.4* 1.4*        Diagnostic Results:     Ref. Range 11/26/2019 07:45   Fluid Color Unknown Yellow   Fluid Appearance Unknown Cloudy   Body Fluid Type Unknown Ascites   WBC, Body Fluid Latest Units: /cu mm 855   Segs, Fluid Latest Units: % 49   Lymphs, Fluid Latest Units: % 13   Monocytes/Macrophages, Fluid Latest Units: % 37   Body Fluid Albumin Latest Ref Range: See text g/dL 1.1   Body Fluid Source, Albumin Unknown Ascites   Body Fluid Source, Total Protein Unknown Ascites   Body Fluid, Protein Latest Ref Range: Not established g/dL 2.1       Assessment and Plan     Hospital Course:    Mr. Luis Hurd was admitted to Hospital Medicine for management of SBP , ARF, and  Decompensated cirrhosis/ liver failure     Active Hospital Problems    Diagnosis  POA    *Spontaneous bacterial peritonitis [K65.2]  Yes    Acute liver failure without hepatic coma [K72.00]  Yes    Hepatorenal syndrome [K76.7]  Yes    Decompensated hepatic cirrhosis [K72.90]  Yes    Alcoholic cirrhosis of liver with ascites [K70.31]  Yes    Coagulopathy [D68.9]  Yes    Severe sepsis with acute organ dysfunction [A41.9, R65.20]  Yes    Anemia of chronic disease [D63.8]  Yes    Hyponatremia [E87.1]  Yes    Alcoholic hepatitis with ascites [K70.11]  Yes    Severe alcohol dependence [F10.20]  Yes    Acute pancreatitis [K85.90]  Yes    LORENE (acute kidney injury) [N17.9]  Yes    Macrocytic anemia [D53.9]  Yes    Severe protein-calorie malnutrition [E43]  Yes    Encounter for pre-transplant evaluation for liver transplant [Z01.818]  Not Applicable  "     Resolved Hospital Problems   No resolved problems to display.       Severe sepsis with organ dysfunction  SBP  - patient diagnosed on 11/21 and started on rocephin then switched to meropenem at OSH  - on 11/25, would be Day 5 of treatment, Zosyn  Started on presentation at [facility] on 11/25/2019   -  Patient underwent 4L paracentesis 11/26/2019, fluid analysis continues to show evidence of SBP with 850  WBCs in 49% segmented neutrophils.  WBC count in the periphery decreasing to 26 from 35.   -  Continue Zosyn.    -  Follow-up blood cultures- NGTD  -  Continue albumin  -  MRCP on 11/26/2019 showed no pancreatic abscess,  Hepatic biliary obstruction. showing 1.4 cm pancreatic tail cyst  - will need repeat LVP/ diagnostic paracentesis on 11/29/2019 to ensure clearance of SBP  - p.o. fluconazole added on 11/28, given concern for worsening infection , given decompensating kidneys  - repeating diagnostic and therapeutic paracentesis on 11/29/2019 to ensure resolution of SBP     Acute liver failure without hepatic coma  Decompensated alcohol cirrhosis with ascites  Alcohol hepatitis with ascites  Severe alcohol dependence   MELD-Na score: 34 at 11/29/2019  6:37 AM  MELD score: 32 at 11/29/2019  6:37 AM  Calculated from:  Serum Creatinine: 5.0 mg/dL (Rounded to 4 mg/dL) at 11/29/2019  6:37 AM  Serum Sodium: 130 mmol/L at 11/29/2019  6:37 AM  Total Bilirubin: 10.6 mg/dL at 11/29/2019  6:37 AM  INR(ratio): 1.4 at 11/29/2019  6:37 AM  Age: 34 years     - patient's last drink about a month ago  Prior to presentation  - viral studies OSH largely negative  - thiamine, folate   - hepatology consulted  - addiction psych consulted   - PETH pending; urine tox + for opioids   - U/S liver with doppler shows "Findings consistent with cirrhosis and sequela of portal hypertension including a large volume of ascites, splenomegaly, and collateral vessel formation"   -  Treat SBP as above  -  Treat renal dysfunction as below  - obtained " financial approval from insurance company for initiation of liver transplant eval- undergoing liver transplant evaluation  - echo ordered     LORENE  Hepatorenal Syndrome  - was being treated for HRS at OSH with rise in Cr with SBP. atient also had IV contrast and had large volume paracentesis and does not appear to have gotten albumin after.  Patient also with recent pancreatitis at the outside hospital, which may be contributing to renal dysfunction  -  Creatinine 3.1 on presentation at [facility]  - urine studies- review concerning for H RS  - cont albumin and octreotide   - nephrology consulted  - midodrine started on 11/27, in attempt to hyper perfuse kidneys   - treat SBP as above  - monitor urine output closely.  Nephrology is following.  Patient may require dialysis soon     Acute pancreatitis   - due to alcohol   - lipase improved from OSH;  Lipase here 540-->453  - CLD--> advance diet on 11/26  - MRCP 11/26- showed 1.4 cm lesion in the tail of the pancreas as described above.  Considerations include a pancreatic cyst, small pancreatic pseudocysts, or cystic neoplasm. No acute pancreatitis      Coagulopathy due to liver disease  -  Likely due to cirrhosis/  Current decompensation, liver failure  -  fibrinogen 262  - vitamin K for 3 days .  INR trended down to 1.4 with IV vitamin K     Hyponatremia  - fluid restrict . Na 132     Macrocytic anemia  Anemia of chronic disease  - hemoglobin 11-10, . Likely due to alcoholism and liver disease  -  Hemolysis labs reviewed, show some evidence of hemolysis  -  iron studies, ferritin and folate-  Showed evidence of anemia of chronic disease  -  Continue folate     Obesity   Severe protein-calorie malnutrition  Body mass index is 36.66 kg/m²  On admission --> Body mass index is 36.66 kg/m².  -  Albumin 2.4, pre-albumin 4. Likely due to alcoholism/  Cirrhosis,  In setting of acute decompensation  -  Advancing diet as tolerated  -  Boost  -  Dietary consult    Diet:   Low Na with fluid restriction   GI PPx:  PO PPI  DVT PPx:  SCDs due to coagulopathy   Goals of Care:  Full     High Risk Conditions:  SUGEY, SBP    Disposition:    TBD.  Undergoing liver transplant evaluation.    If patient is approved for liver transplant, may require hospitalization until his transplantation.    Given patient's decompensated status, he is low threshold for ICU admission      Patient's note was created using MModal Dictation.  Any errors in syntax may not have been identified and edited on initial review prior to signing this note.    Signing Physician:     Mya Subramanian MD  Department of Hospital Medicine   [facility]  Pager [phone]  Spectra [phone]  11/29/2019

## 2019-11-29 NOTE — PHYSICIAN QUERY
"PT Name: Luis Hurd  MR #: [id number]     Physician Query Form - Documentation Clarification      CDS/: Chito CUNNINGHAM, RN-BC  Contact information: [email or web address]    This form is a permanent document in the medical record.     Query Date: November 29, 2019    By submitting this query, we are merely seeking further clarification of documentation. Please utilize your independent clinical judgment when addressing the question(s) below.    The Medical record reflects the following:    Supporting Clinical Findings Location in Medical Record   Moderate protein-calorie malnutrition...Due to poor PO intake ~5 days, edema, and moderate wasting  Height: 6' 2" (188 cm)  Weight: 129.5 kg (285 lb 7.9 oz)  BMI (Calculated): 36.6     Registered Dietitian, Sana Vasquez RD, Plan of Care, 11/28/19   Severe protein-calorie malnutrition  -  Albumin 2.4, pre-albumin 4. Likely due to alcoholism/  Cirrhosis,  In setting of acute decompensation  -  Advancing diet as tolerated  -  Boost  -  Dietary consult     [facility], [place], Dr. Subramanian, 11/26/19                                                                          Doctor, Please specify diagnosis or diagnoses associated with above clinical findings.    Doctor, due to conflicting documentation, please clarify malnutrition condition:    Provider Use Only     [   ] Moderate protein-calorie malnutrition   [  X ] Severe protein-calorie malnutrition   [   ] Other,______________________       [  ] Clinically Undetermined               "

## 2019-11-29 NOTE — ASSESSMENT & PLAN NOTE
Per primary team  Liver team evaluation for txp  MELD-Na score: 34 at 11/29/2019  6:37 AM  MELD score: 32 at 11/29/2019  6:37 AM  Calculated from:  Serum Creatinine: 5.0 mg/dL (Rounded to 4 mg/dL) at 11/29/2019  6:37 AM  Serum Sodium: 130 mmol/L at 11/29/2019  6:37 AM  Total Bilirubin: 10.6 mg/dL at 11/29/2019  6:37 AM  INR(ratio): 1.4 at 11/29/2019  6:37 AM  Age: 34 years

## 2019-11-29 NOTE — PROCEDURES
Radiology Post-Procedure Note    Pre Op Diagnosis: Ascites  Post Op Diagnosis: Same    Procedure: Paracentesis    Procedure performed by: Ezequiel Hunter MD    Written Informed Consent Obtained: Yes  Specimen Removed: YES   Estimated Blood Loss: less than 10     Findings:   Successful paracentesis.  Albumin administered PRN per protocol.    Patient tolerated procedure well.    Ezequiel Hunter MD  Radiology R1

## 2019-11-29 NOTE — H&P
Vascular and Interventional Radiology History & Physical    Date:  11/29/2019    Chief Complaint:   Ascites     History of Present Illness:  Luis Hurd is a 34 y.o. male who presents for ultrasound guided paracentesis.    Past Medical History:  Past Medical History:   Diagnosis Date    Acute pancreatitis 11/26/2019    Alcoholic cirrhosis of liver with ascites 11/26/2019    Severe alcohol dependence 11/26/2019    Spontaneous bacterial peritonitis 11/25/2019       Past Surgical History:  History reviewed. No pertinent surgical history.     Sedation History:    Denies any adverse reactions.     Social History:  Social History     Tobacco Use    Smoking status: Never Smoker    Smokeless tobacco: Never Used   Substance Use Topics    Alcohol use: Not on file    Drug use: Not on file        Home Medications:   Prior to Admission medications    Medication Sig Start Date End Date Taking? Authorizing Provider   furosemide (LASIX) 40 MG tablet Take 40 mg by mouth once daily.    Historical Provider, MD   multivitamin (THERAGRAN) per tablet Take 1 tablet by mouth once daily.    Historical Provider, MD   spironolactone (ALDACTONE) 25 MG tablet Take 25 mg by mouth once daily.    Historical Provider, MD       Inpatient Medications:    Current Facility-Administered Medications:     acetaminophen tablet 325 mg, 325 mg, Oral, Q4H PRN, Mya Subramanian MD    albumin human 25% bottle 25 g, 25 g, Intravenous, TID, Mya Subramanian MD, 25 g at 11/29/19 0906    bisacodyl suppository 10 mg, 10 mg, Rectal, Daily PRN, Mya Subramanian MD    dextrose 10% (D10W) Bolus, 12.5 g, Intravenous, PRN, Ender Cervantes MD    dextrose 10% (D10W) Bolus, 25 g, Intravenous, PRN, Ender Cervantes MD    [START ON 11/30/2019] fluconazole tablet 100 mg, 100 mg, Oral, Daily, Mya Subramanian MD    folic acid tablet 1 mg, 1 mg, Oral, Daily, Ender Cervantes MD, 1 mg at 11/29/19 0906    glucagon (human recombinant) injection 1 mg, 1 mg,  Intramuscular, PRN, Ender Cervantes MD    glucose chewable tablet 16 g, 16 g, Oral, PRN, Ender Cervantes MD    glucose chewable tablet 24 g, 24 g, Oral, PRN, Ender Cervantes MD    midodrine tablet 15 mg, 15 mg, Oral, TID, Mya Subramainan MD, 15 mg at 11/29/19 0906    octreotide injection 100 mcg, 100 mcg, Subcutaneous, Q8H, Ender Cervantes MD, 100 mcg at 11/29/19 0557    ondansetron tablet 8 mg, 8 mg, Oral, Q6H PRN, Ender Cervantes MD, 8 mg at 11/28/19 2154    piperacillin-tazobactam 4.5 g in sodium chloride 0.9% 100 mL IVPB (ready to mix system), 4.5 g, Intravenous, Q8H, Ender Cervantes MD, Last Rate: 25 mL/hr at 11/29/19 0557, 4.5 g at 11/29/19 0557    polyethylene glycol packet 17 g, 17 g, Oral, Daily, Mya Subramanian MD, 17 g at 11/29/19 0906    promethazine (PHENERGAN) 12.5 mg in dextrose 5 % 50 mL IVPB, 12.5 mg, Intravenous, Q6H PRN, Ender Cervantes MD    senna-docusate 8.6-50 mg per tablet 1 tablet, 1 tablet, Oral, Daily, Mya Subramanian MD, 1 tablet at 11/29/19 0906    sevelamer carbonate tablet 800 mg, 800 mg, Oral, TID WM, Mya Subramanian MD, 800 mg at 11/29/19 1236    sodium chloride 0.9% flush 10 mL, 10 mL, Intravenous, PRN, Ender Cervantes MD    thiamine tablet 100 mg, 100 mg, Oral, Daily, Ender Cervantes MD, 100 mg at 11/29/19 0906    traMADol tablet 50 mg, 50 mg, Oral, Q6H PRN, Ender Cervantes MD, 50 mg at 11/29/19 0557     Anticoagulants/Antiplatelets:   no anticoagulation    Allergies:   Review of patient's allergies indicates:  No Known Allergies    Review of Systems:   As documented in primary provider H&P.    Vital Signs (Most Recent):  Temp: 97.5 °F (36.4 °C) (11/29/19 1146)  Pulse: 90 (11/29/19 1510)  Resp: 18 (11/29/19 1510)  BP: (!) 143/75 (11/29/19 1510)  SpO2: 95 % (11/29/19 1510)    Physical Exam:  No acute distress, laying comfortably in bed, pleasant and cooperative  Regular rate and rhythm  Breathing unlabored  Abdomen benign  Extremities warm and well  perfused    Sedation Exam:  ASA: II - Patient appears to have mild systemic disease, adequately controlled   Mallampati: II (hard and soft palate, upper portion of tonsils anduvula visible)     Laboratory  Lab Results   Component Value Date    INR 1.4 (H) 11/29/2019       Lab Results   Component Value Date    WBC 40.97 (H) 11/29/2019    HGB 8.7 (L) 11/29/2019    HCT 26.8 (L) 11/29/2019     (H) 11/29/2019     (L) 11/29/2019      Lab Results   Component Value Date     (H) 11/29/2019     (L) 11/29/2019    K 4.5 11/29/2019    CL 94 (L) 11/29/2019    CO2 23 11/29/2019    BUN 87 (H) 11/29/2019    CREATININE 5.0 (H) 11/29/2019    CALCIUM 8.7 11/29/2019    MG 3.1 (H) 11/29/2019    ALT 36 11/29/2019     (H) 11/29/2019    ALBUMIN 2.7 (L) 11/29/2019    BILITOT 10.6 (H) 11/29/2019       ASSESSMENT/PLAN:   34yoM c ascites                     Sedation Plan: local only  Patient will undergo: paracentesis      Yon Kraus MD  R4, Diagnostic and Interventional Radiology  Pager: [phone]

## 2019-11-29 NOTE — ASSESSMENT & PLAN NOTE
Unclear at this time etiology of LORENE, but likely from HRS (hyponatremia, low/normal Bps) vs prolonged prerenal or volume depletion after paracentesis 6L removal at prior hospital  - Renal US, no significant abnormalities seen in kidneys  - U/P Cr ratio: 0.23; urine sodium<20  - Cr is worsening. UOP decreasing    Plan:  - no need for RRt today. Will likely need it soon.  - IR Paracentesis performed on 11/26, 4 liters removed. Will have another paracentesis today.  - Strict I/Os and chart  - Agree with HRS protocol; aim for BP MAP >85  - Hb > 7 gm/dL  - Avoid nephrotoxic agents, NSAIDs, IV contrast, etc  - Will follow closely

## 2019-11-29 NOTE — PROGRESS NOTES
Paracentesis complete. Pt tolerated well. Site clean, dry, intact, no bleeding, no hematoma. Pt to return to room in stretcher via transport. Report called to RN.

## 2019-11-29 NOTE — PROGRESS NOTES
PHARM.D. PRE-TRANSPLANT NOTE:    This patient's medication therapy was evaluated as part of his pre-transplant evaluation.      The following general pharmacologic concerns were noted: Currently hospitalized on antibiotics (zosyn and fluconazole).     The following pharmacologic concerns related to HCV therapy were noted: N/A      This patient's medication profile was reviewed for considerations for DAA Hepatitis C therapy:    [x]  No current inducers of CYP 3A4 or PGP  [x]  No amiodarone on this patient's EMR profile in the last 24 months  [x]  No past or current atrial fibrillation on this patient's EMR profile       No current facility-administered medications for this visit.      No current outpatient medications on file.     Facility-Administered Medications Ordered in Other Visits   Medication Dose Route Frequency Provider Last Rate Last Dose    acetaminophen tablet 325 mg  325 mg Oral Q4H PRN Mya Subramanian MD        albumin human 25% bottle 25 g  25 g Intravenous TID Mya Subramanian MD   25 g at 11/28/19 2153    bisacodyl suppository 10 mg  10 mg Rectal Daily PRN Mya Subramanian MD        dextrose 10% (D10W) Bolus  12.5 g Intravenous PRN Ender Cervantes MD        dextrose 10% (D10W) Bolus  25 g Intravenous PRN Ender Cervantes MD        fluconazole tablet 200 mg  200 mg Oral Daily Mya Subramanian MD   200 mg at 11/28/19 1200    folic acid tablet 1 mg  1 mg Oral Daily Ender Cervantes MD   1 mg at 11/28/19 0916    glucagon (human recombinant) injection 1 mg  1 mg Intramuscular PRN Ender Cervantes MD        glucose chewable tablet 16 g  16 g Oral PRN Ender Cervantes MD        glucose chewable tablet 24 g  24 g Oral PRN Ender Cervantes MD        midodrine tablet 15 mg  15 mg Oral TID Mya Subramanian MD   15 mg at 11/28/19 2154    octreotide injection 100 mcg  100 mcg Subcutaneous Q8H Ender Cervantes MD   100 mcg at 11/29/19 0557    ondansetron tablet 8 mg  8 mg Oral Q6H PRN Ender Cervantes MD    8 mg at 11/28/19 2154    piperacillin-tazobactam 4.5 g in sodium chloride 0.9% 100 mL IVPB (ready to mix system)  4.5 g Intravenous Q8H Ender Cervantes MD 25 mL/hr at 11/29/19 0557 4.5 g at 11/29/19 0557    polyethylene glycol packet 17 g  17 g Oral Daily Mya Subramanian MD   17 g at 11/28/19 0917    promethazine (PHENERGAN) 12.5 mg in dextrose 5 % 50 mL IVPB  12.5 mg Intravenous Q6H PRN Ender Cervantes MD        senna-docusate 8.6-50 mg per tablet 1 tablet  1 tablet Oral Daily Mya Subramanian MD   1 tablet at 11/28/19 0916    sodium chloride 0.9% flush 10 mL  10 mL Intravenous PRN Ender Cervantes MD        thiamine tablet 100 mg  100 mg Oral Daily Ender Cervantes MD   100 mg at 11/28/19 0916    traMADol tablet 50 mg  50 mg Oral Q6H PRN Ender Cervantes MD   50 mg at 11/29/19 0557         Currently Marcell Vannessa is responsible for preparing / administering this patient's medications on a daily basis.  I am available for consultation and can be contacted, as needed by the other members of the Liver Transplant team.

## 2019-11-29 NOTE — NURSING
Dr. Subramanian notified pt noriega was d/c'd at 1230pm and pt has not yet voided. 0 ml per bladder scan. No new orders, instructed to monitor.

## 2019-11-30 NOTE — ASSESSMENT & PLAN NOTE
Per primary team  Liver team evaluation for txp  MELD-Na score: 34 at 11/30/2019  5:53 AM  MELD score: 32 at 11/30/2019  5:53 AM  Calculated from:  Serum Creatinine: 6.0 mg/dL (Rounded to 4 mg/dL) at 11/30/2019  5:53 AM  Serum Sodium: 128 mmol/L at 11/30/2019  5:53 AM  Total Bilirubin: 9.1 mg/dL at 11/30/2019  5:53 AM  INR(ratio): 1.4 at 11/30/2019  5:53 AM  Age: 34 years

## 2019-11-30 NOTE — PROGRESS NOTES
Pharmacokinetic Assessment Follow Up: IV Vancomycin    Vancomycin serum concentration assessment(s):    · Vanc lvl 28.9 mcg/mL this AM, unclear how long this is after dose given b/c nurse did not chart administration     Vancomycin Regimen Plan:    · SCr continues to worsen today, SCr up to 6 (may need HD soon), will hold off on redose of vanc and reorder a vancomycin random level for Sunday AM    Drug levels (last 3 results):  Recent Labs   Lab Result Units 11/30/19  0553   Vancomycin, Random ug/mL 28.9       Pharmacy will continue to follow and monitor vancomycin.    Please contact pharmacy at extension [phone] for questions regarding this assessment.    Thank you for the consult,   Mely Burgos, PharmD, [facility]  [phone]       Patient brief summary:  Luis Hurd is a 34 y.o. male initiated on antimicrobial therapy with IV Vancomycin for treatment of intra-abdominal infection    The patient's current regimen is pulse dosing    Drug Allergies:   Review of patient's allergies indicates:  No Known Allergies    Actual Body Weight:   129kg    Renal Function:   Estimated Creatinine Clearance: 24.8 mL/min (A) (based on SCr of 6 mg/dL (H)).,       CBC (last 72 hours):  Recent Labs   Lab Result Units 11/28/19  0545 11/29/19  0637 11/29/19  1635 11/30/19  0553   WBC K/uL 27.69* 40.97* 34.15* 25.78*   Hemoglobin g/dL 8.9* 8.7* 8.6* 7.9*   Hematocrit % 27.3* 26.8* 26.0* 24.5*   Platelets K/uL 111* 126* 112* 79*   Gran% % 78.0* 79.5* 77.0*  --    Lymph% % 11.0* 9.0* 8.0*  --    Mono% % 5.0 2.5* 8.0  --    Eosinophil% % 2.0 2.5 2.0  --    Basophil% % 0.0 0.0 0.0  --    Differential Method  Manual Manual Manual  --        Metabolic Panel (last 72 hours):  Recent Labs   Lab Result Units 11/28/19  0545 11/29/19  0637 11/29/19  1338 11/29/19  1635 11/30/19  0553   Sodium mmol/L 130* 130*  --  130* 128*   Potassium mmol/L 4.5 4.5  --  4.3 4.3   Chloride mmol/L 94* 94*  --  93* 92*   CO2 mmol/L 24 23  --  21* 23   Glucose mg/dL 117*  117*  --  111* 111*   Glucose, UA   --   --  Negative  --   --    BUN, Bld mg/dL 73* 87*  --  88* 91*   Creatinine mg/dL 4.3* 5.0*  --  5.5* 6.0*   Albumin g/dL 3.1* 2.7*  --   --  3.0*   Total Bilirubin mg/dL 10.3* 10.6*  --   --  9.1*   Alkaline Phosphatase U/L 201* 209*  --   --  194*   AST U/L 111* 125*  --   --  110*   ALT U/L 36 36  --   --  32   Magnesium mg/dL 2.8* 3.1*  --   --  3.0*   Phosphorus mg/dL 5.2* 6.3*  --   --  6.3*       Vancomycin Administrations:  vancomycin given in the last 96 hours      No antibiotic orders with administrations found.                Microbiologic Results:  Microbiology Results (last 7 days)     Procedure Component Value Units Date/Time    Aerobic culture [[id number]] Collected:  11/29/19 1556    Order Status:  Completed Specimen:  Body Fluid from Ascites Updated:  11/30/19 0805     Aerobic Bacterial Culture No growth    Blood culture [[id number]] Collected:  11/25/19 2230    Order Status:  Completed Specimen:  Blood Updated:  11/29/19 2312     Blood Culture, Routine No Growth to date      No Growth to date      No Growth to date      No Growth to date      No Growth to date    Blood culture [[id number]] Collected:  11/25/19 2207    Order Status:  Completed Specimen:  Blood Updated:  11/29/19 2312     Blood Culture, Routine No Growth to date      No Growth to date      No Growth to date      No Growth to date      No Growth to date    Blood culture [[id number]] Collected:  11/29/19 1133    Order Status:  Completed Specimen:  Blood Updated:  11/29/19 1915     Blood Culture, Routine No Growth to date    Narrative:       Collection has been rescheduled by T at 11/29/2019 11:02 Reason:   patient in bathroom.   Collection has been rescheduled by T at 11/29/2019 11:19 Reason:   Unable to collect  Collection has been rescheduled by T at 11/29/2019 11:02 Reason:   patient in bathroom.   Collection has been rescheduled by T at 11/29/2019 11:19 Reason:   Unable to collect    Blood  culture [[id number]] Collected:  11/29/19 1143    Order Status:  Completed Specimen:  Blood Updated:  11/29/19 1915     Blood Culture, Routine No Growth to date    Narrative:       Collection has been rescheduled by VHT at 11/29/2019 11:02 Reason:   patient in bathroom.   Collection has been rescheduled by VHT at 11/29/2019 11:19 Reason:   Unable to collect  Collection has been rescheduled by VHT at 11/29/2019 11:02 Reason:   patient in bathroom.   Collection has been rescheduled by VHT at 11/29/2019 11:19 Reason:   Unable to collect    Gram stain [[id number]] Collected:  11/29/19 1556    Order Status:  Completed Specimen:  Body Fluid from Ascites Updated:  11/29/19 1814     Gram Stain Result Rare WBC's      No organisms seen    Fungus culture [[id number]] Collected:  11/29/19 1556    Order Status:  Sent Specimen:  Body Fluid from Ascites Updated:  11/29/19 1717    Culture, Anaerobe [[id number]] Collected:  11/29/19 1556    Order Status:  Sent Specimen:  Body Fluid from Ascites Updated:  11/29/19 1716    Urine culture [[id number]] Collected:  11/29/19 1338    Order Status:  No result Specimen:  Urine Updated:  11/29/19 1647    Culture, Anaerobe [[id number]] Collected:  11/26/19 0744    Order Status:  Completed Specimen:  Body Fluid from Peritoneal Fluid Updated:  11/29/19 1030     Anaerobic Culture Culture in progress    Aerobic culture [[id number]] Collected:  11/26/19 0744    Order Status:  Completed Specimen:  Body Fluid from Peritoneal Fluid Updated:  11/29/19 1001     Aerobic Bacterial Culture No growth

## 2019-11-30 NOTE — PLAN OF CARE
Plan of care reviewed with patient and family. Vitals stable today. No complaints of pain or SOB. Sat up in chair for breakfast and lunch, showered with minimal assistance and back to bed at 1500. Pt tolerated activity well. Call bell in reach, wheels locked. Continue to monitor, NAD

## 2019-11-30 NOTE — PROGRESS NOTES
Therapy with vancomycin complete and/or consult discontinued by provider. Pharmacy will sign off, please re-consult as needed.    Mely Burgos, PharmD, BCPS  [phone]

## 2019-11-30 NOTE — SUBJECTIVE & OBJECTIVE
Interval History: Patient seen and examined at bedside, decreasing urine output. IR paracentesis yesterday removed 6 liters.     Review of patient's allergies indicates:  No Known Allergies  Current Facility-Administered Medications   Medication Frequency    acetaminophen tablet 325 mg Q4H PRN    albumin human 25% bottle 25 g TID    bisacodyl suppository 10 mg Daily PRN    [START ON 12/1/2019] ciprofloxacin HCl tablet 250 mg Daily    dextrose 10% (D10W) Bolus PRN    dextrose 10% (D10W) Bolus PRN    fluconazole tablet 100 mg Daily    folic acid tablet 1 mg Daily    glucagon (human recombinant) injection 1 mg PRN    glucose chewable tablet 16 g PRN    glucose chewable tablet 24 g PRN    midodrine tablet 15 mg TID    octreotide injection 100 mcg Q8H    ondansetron tablet 8 mg Q6H PRN    piperacillin-tazobactam 4.5 g in sodium chloride 0.9% 100 mL IVPB (ready to mix system) Q8H    polyethylene glycol packet 17 g Daily    promethazine (PHENERGAN) 12.5 mg in dextrose 5 % 50 mL IVPB Q6H PRN    senna-docusate 8.6-50 mg per tablet 1 tablet Daily    sevelamer carbonate tablet 800 mg TID WM    sodium chloride 0.9% flush 10 mL PRN    thiamine tablet 100 mg Daily    traMADol tablet 50 mg Q6H PRN       Objective:     Vital Signs (Most Recent):  Temp: 98.5 °F (36.9 °C) (11/30/19 1556)  Pulse: 92 (11/30/19 1556)  Resp: 18 (11/30/19 1556)  BP: (!) 109/57 (11/30/19 1556)  SpO2: (!) 94 % (11/30/19 1556)  O2 Device (Oxygen Therapy): room air (11/30/19 0800) Vital Signs (24h Range):  Temp:  [97.6 °F (36.4 °C)-98.5 °F (36.9 °C)] 98.5 °F (36.9 °C)  Pulse:  [86-95] 92  Resp:  [18] 18  SpO2:  [91 %-97 %] 94 %  BP: (101-135)/(52-63) 109/57     Weight: 129.3 kg (285 lb) (11/29/19 1300)  Body mass index is 36.59 kg/m².  Body surface area is 2.6 meters squared.    I/O last 3 completed shifts:  In: 980 [P.O.:480; I.V.:100; IV Piggyback:400]  Out: 6210 [Urine:460; Other:5750]    Physical Exam   Constitutional: He is oriented  to person, place, and time. He appears ill. No distress.   HENT:   Head: Normocephalic.   Eyes: Conjunctivae are normal. Scleral icterus is present.   Neck: Normal range of motion. Neck supple.   Cardiovascular: Normal rate and regular rhythm.   Pulmonary/Chest: Effort normal and breath sounds normal.   Abdominal: Soft. Bowel sounds are normal. He exhibits distension. He exhibits no mass. There is no tenderness. There is no guarding.   Musculoskeletal: Normal range of motion.   Neurological: He is oriented to person, place, and time.   Skin: Skin is warm and dry.   Psychiatric: He has a normal mood and affect.       Significant Labs:  CBC:   Recent Labs   Lab 11/30/19  0553   WBC 25.78*   RBC 2.16*   HGB 7.9*   HCT 24.5*   PLT 79*   *   MCH 36.6*   MCHC 32.2     CMP:   Recent Labs   Lab 11/30/19  0553   *   CALCIUM 8.6*   ALBUMIN 3.0*   PROT 5.3*   *   K 4.3   CO2 23   CL 92*   BUN 91*   CREATININE 6.0*   ALKPHOS 194*   ALT 32   *   BILITOT 9.1*     All labs within the past 24 hours have been reviewed.     Significant Imaging:  Labs: Reviewed

## 2019-11-30 NOTE — PROGRESS NOTES
[facility]  Nephrology  Progress Note    Patient Name: Luis Hurd  MRN: [id number]  Admission Date: 11/25/2019  Hospital Length of Stay: 5 days  Attending Provider: May Subramanian MD   Primary Care Physician: William Nettles MD  Principal Problem:Spontaneous bacterial peritonitis    Subjective:     HPI: 35 y/o man with hx of alcohol abuse, alcoholic cirrhosis decompensated with ascites,  morbid obesity, no history of CKD baseline sCr 0.7 (as of 11/20/2019), admitted on 11/25/2019 as a transfer from OSH for liver txp evaluation and LORENE, concern for HRS.  Originally presented on 11/20 to OSH with worsening abdominal pain and distention, along with increased lower extremity edema, and also diagnosed with SBP and pancreatitis.  Last drink ~1 month ago (started drinking ~15 yrs ago, with 2-3 wine bottles/day).  On admit, sCr 0.7, had paracentesis with ~6L removal, administered albumin post procedure, no report of acute hypotension during previous hospital stay, yet subsequently developed rise in sCr to 1.63 on 11/22, 2.39 on 11/23, 2.44 on 11/25, and labs on arrival to [facility] on 11/25 up to 3.1.  He was initiated in HRS treatment, receiving albumin, octreotide, and midodrine.  Ofr SBP started on ceftriaxone.      Nephrology consulted for evaluation of Lorene.      Interval History: Patient seen and examined at bedside, decreasing urine output. IR paracentesis yesterday removed 6 liters.     Review of patient's allergies indicates:  No Known Allergies  Current Facility-Administered Medications   Medication Frequency    acetaminophen tablet 325 mg Q4H PRN    albumin human 25% bottle 25 g TID    bisacodyl suppository 10 mg Daily PRN    [START ON 12/1/2019] ciprofloxacin HCl tablet 250 mg Daily    dextrose 10% (D10W) Bolus PRN    dextrose 10% (D10W) Bolus PRN    fluconazole tablet 100 mg Daily    folic acid tablet 1 mg Daily    glucagon (human recombinant) injection 1 mg PRN    glucose  chewable tablet 16 g PRN    glucose chewable tablet 24 g PRN    midodrine tablet 15 mg TID    octreotide injection 100 mcg Q8H    ondansetron tablet 8 mg Q6H PRN    piperacillin-tazobactam 4.5 g in sodium chloride 0.9% 100 mL IVPB (ready to mix system) Q8H    polyethylene glycol packet 17 g Daily    promethazine (PHENERGAN) 12.5 mg in dextrose 5 % 50 mL IVPB Q6H PRN    senna-docusate 8.6-50 mg per tablet 1 tablet Daily    sevelamer carbonate tablet 800 mg TID WM    sodium chloride 0.9% flush 10 mL PRN    thiamine tablet 100 mg Daily    traMADol tablet 50 mg Q6H PRN       Objective:     Vital Signs (Most Recent):  Temp: 98.5 °F (36.9 °C) (11/30/19 1556)  Pulse: 92 (11/30/19 1556)  Resp: 18 (11/30/19 1556)  BP: (!) 109/57 (11/30/19 1556)  SpO2: (!) 94 % (11/30/19 1556)  O2 Device (Oxygen Therapy): room air (11/30/19 0800) Vital Signs (24h Range):  Temp:  [97.6 °F (36.4 °C)-98.5 °F (36.9 °C)] 98.5 °F (36.9 °C)  Pulse:  [86-95] 92  Resp:  [18] 18  SpO2:  [91 %-97 %] 94 %  BP: (101-135)/(52-63) 109/57     Weight: 129.3 kg (285 lb) (11/29/19 1300)  Body mass index is 36.59 kg/m².  Body surface area is 2.6 meters squared.    I/O last 3 completed shifts:  In: 980 [P.O.:480; I.V.:100; IV Piggyback:400]  Out: 6210 [Urine:460; Other:5750]    Physical Exam   Constitutional: He is oriented to person, place, and time. He appears ill. No distress.   HENT:   Head: Normocephalic.   Eyes: Conjunctivae are normal. Scleral icterus is present.   Neck: Normal range of motion. Neck supple.   Cardiovascular: Normal rate and regular rhythm.   Pulmonary/Chest: Effort normal and breath sounds normal.   Abdominal: Soft. Bowel sounds are normal. He exhibits distension. He exhibits no mass. There is no tenderness. There is no guarding.   Musculoskeletal: Normal range of motion.   Neurological: He is oriented to person, place, and time.   Skin: Skin is warm and dry.   Psychiatric: He has a normal mood and affect.       Significant  Labs:  CBC:   Recent Labs   Lab 11/30/19  0553   WBC 25.78*   RBC 2.16*   HGB 7.9*   HCT 24.5*   PLT 79*   *   MCH 36.6*   MCHC 32.2     CMP:   Recent Labs   Lab 11/30/19  0553   *   CALCIUM 8.6*   ALBUMIN 3.0*   PROT 5.3*   *   K 4.3   CO2 23   CL 92*   BUN 91*   CREATININE 6.0*   ALKPHOS 194*   ALT 32   *   BILITOT 9.1*     All labs within the past 24 hours have been reviewed.     Significant Imaging:  Labs: Reviewed    Assessment/Plan:     * Spontaneous bacterial peritonitis  Management per primary team, on IV antibiotics     Acute renal failure with tubular necrosis  Unclear at this time etiology of LORENE, but likely from HRS (hyponatremia, low/normal Bps) vs prolonged prerenal or volume depletion after paracentesis 6L removal at prior hospital  - Renal US, no significant abnormalities seen in kidneys  - U/P Cr ratio: 0.23; urine sodium<20  - Cr is worsening. UOP decreasing    Plan:  - no need for RRt today. Will likely need it soon as creatinine is trending up, consented for RRT, signed consent in chart  - IR Paracentesis performed on 11/26, 4 liters removed. Repeat paracentesis on 11/29 removed 6 liters.  - Strict I/Os and chart  - Agree with HRS protocol; aim for BP MAP >85  - Hb > 7 gm/dL  - Avoid nephrotoxic agents, NSAIDs, IV contrast, etc  - Will follow closely      Hyponatremia  Likely from his liver failure and volume overload (hypervolemic hyponatremia)   Refer to LORENE    Decompensated hepatic cirrhosis  Per primary team  Liver team evaluation for txp  MELD-Na score: 34 at 11/30/2019  5:53 AM  MELD score: 32 at 11/30/2019  5:53 AM  Calculated from:  Serum Creatinine: 6.0 mg/dL (Rounded to 4 mg/dL) at 11/30/2019  5:53 AM  Serum Sodium: 128 mmol/L at 11/30/2019  5:53 AM  Total Bilirubin: 9.1 mg/dL at 11/30/2019  5:53 AM  INR(ratio): 1.4 at 11/30/2019  5:53 AM  Age: 34 years          Thank you for your consult. I will follow-up with patient. Please contact us if you have any  additional questions.    Rito Laird MD  Nephrology  [facility]

## 2019-11-30 NOTE — PROGRESS NOTES
Progress Note  Hospital Medicine  [facility] [place] [place]         Patient Name: Luis Hurd  MRN:  [id number]  Hospital Medicine Team: [facility] MED L Mya Subramanian MD  Date of Admission:  11/25/2019     Length of Stay:  LOS: 5 days   Expected Discharge Date: 12/4/2019  Principal Problem:  Spontaneous bacterial peritonitis     Subjective:     Interval History/Overnight Events:    Doing well today,Mental status at baseline.  Patient received 1 dose of vancomycin for possible Enterococcus coverage yesterday due to white count of 40 and worsening renal function.  Plan to stop vancomycin today.  Patient is still therapeutic.  Status post 6 L paracentesis yesterday which showed resolution of SBP.  Plan for 5 days of therapy with Zosyn and 5 days of therapy with fluconazole.  Will need ciprofloxacin prophylaxis for SBP going forward.  Creatinine up to 6 today.  Within otherwise no electrolyte abnormalities, acidosis, uremia, volume overload.  Nephrology is following.  Currently no indications for HD  Patient has an Advance directive which was discussed with him and his parents today.  Patient has has made wishes of withdrawal of care in the case of end-stage futile measures, if that were ever case.  He is interested in all aggressive measures, full code, transplantation, at this time     albumin human 25%  25 g Intravenous TID    [START ON 12/1/2019] ciprofloxacin HCl  250 mg Oral Daily    fluconazole  100 mg Oral Daily    folic acid  1 mg Oral Daily    midodrine  15 mg Oral TID    octreotide  100 mcg Subcutaneous Q8H    piperacillin-tazobactam (ZOSYN) IVPB  4.5 g Intravenous Q8H    polyethylene glycol  17 g Oral Daily    senna-docusate 8.6-50 mg  1 tablet Oral Daily    sevelamer carbonate  800 mg Oral TID WM    thiamine  100 mg Oral Daily           acetaminophen, bisacodyl, Dextrose 10% Bolus, Dextrose 10% Bolus, glucagon (human recombinant), glucose, glucose, ondansetron,  promethazine (PHENERGAN) IVPB, sodium chloride 0.9%, traMADol    Review of Systems   Constitutional: Negative for chills, fatigue, fever.   HENT: Negative for sore throat, trouble swallowing.    Eyes: Negative for photophobia, visual disturbance.   Respiratory: Negative for cough, shortness of breath.    Cardiovascular: Negative for chest pain, palpitations, leg swelling.   Gastrointestinal: Negative for abdominal pain, constipation, diarrhea, nausea, vomiting.   Endocrine: Negative for cold intolerance, heat intolerance.   Genitourinary: Negative for dysuria, frequency.   Musculoskeletal: Negative for arthralgias, myalgias.   Skin: Negative for rash, wound, erythema   Neurological: Negative for dizziness, syncope, weakness, light-headedness.   Psychiatric/Behavioral: Negative for confusion, hallucinations, anxiety  All other systems reviewed and are negative.    Objective:     Temp:  [97.6 °F (36.4 °C)-98.5 °F (36.9 °C)]   Pulse:  [86-95]   Resp:  [18]   BP: (101-135)/(52-63)   SpO2:  [91 %-97 %]       Physical Exam:  Constitutional: appears older than stated age, chronically ill looking,  non-distressed, not diaphoretic.   HENT: NC/AT, external ears normal, oropharynx clear, MMM w/o exudates.   Eyes: PERRL, EOMI, conjunctiva normal, no discharge b/l, +scleral icterus   Neck: normal ROM, supple  CV: RRR, no m/r/g, no carotid bruits, +2 peripheral pulses.  Pulmonary/Chest wall: Breathing comfortably w/o distress, CTAB, no w/r/r, no crackles.    GI: Soft, non-tender, (+) BS, (+) BM   +distended,  Caput medusa present,  Obese abdomen  Musculoskeletal: Normal ROM, no atrophy,  +trace pitting edema in LE bilaterally   Neurological: AAO x 4, CN II-XI in tact, nl sensation, nl strength/tone  No asterixis   Skin: warm, dry   +pallor, jaundice, + spider angiomas, +bruising   Psych: normal mood and affect, normal behavior, thought content and judgement.    Labs:    Chemistries:   Recent Labs   Lab 11/28/19  2368  11/29/19  0637 11/29/19  1635 11/30/19  0553   * 130* 130* 128*   K 4.5 4.5 4.3 4.3   CL 94* 94* 93* 92*   CO2 24 23 21* 23   BUN 73* 87* 88* 91*   CREATININE 4.3* 5.0* 5.5* 6.0*   CALCIUM 8.7 8.7 8.9 8.6*   PROT 5.5* 5.2*  --  5.3*   BILITOT 10.3* 10.6*  --  9.1*   ALKPHOS 201* 209*  --  194*   ALT 36 36  --  32   * 125*  --  110*   MG 2.8* 3.1*  --  3.0*   PHOS 5.2* 6.3*  --  6.3*        WBC:   Recent Labs   Lab 11/27/19  0457 11/28/19  0545 11/29/19  0637 11/29/19  1635 11/30/19  0553   WBC 21.84* 27.69* 40.97* 34.15* 25.78*     Bands:     CBC/Anemia Labs: Coags:    Recent Labs   Lab 11/26/19  1005  11/29/19  0637 11/29/19  1635 11/30/19  0553   WBC 25.97*   < > 40.97* 34.15* 25.78*   HGB 10.1*   < > 8.7* 8.6* 7.9*   HCT 30.3*   < > 26.8* 26.0* 24.5*      < > 126* 112* 79*   *   < > 113* 114* 113*   RDW 13.9   < > 14.1 14.3 14.5   IRON 36*  --   --   --   --    FERRITIN 2,782*  --   --   --   --    FOLATE 7.6  --   --   --   --    [id number] >2000*  --   --   --   --     < > = values in this interval not displayed.    Recent Labs   Lab 11/28/19  0545 11/29/19  0637 11/30/19  0553   INR 1.4* 1.4* 1.4*        Diagnostic Results:       Ref. Range 11/26/2019 07:45 11/29/2019 15:56   Fluid Color Unknown Yellow Yellow   Fluid Appearance Unknown Cloudy Cloudy   Body Fluid Type Unknown Ascites Ascites   WBC, Body Fluid Latest Units: /cu mm 855 468   Segs, Fluid Latest Units: % 49 27   Lymphs, Fluid Latest Units: % 13 36   Monocytes/Macrophages, Fluid Latest Units: % 37 34   Eos, Fluid Latest Units: %  2   Lipase, Body Fluid Latest Units: U/L 2893    Body Fluid Albumin Latest Ref Range: See text g/dL 1.1 1.2   Body Fluid Source, Albumin Unknown Ascites Ascites   Body Fluid Source, Lipase Unknown Ascites fluid    Body Fluid Source, Total Protein Unknown Ascites Ascites   Body Fluid, Protein Latest Ref Range: Not established g/dL 2.1 2.1       Assessment and Plan     Hospital Course:    Mr. Smith  SHAD Hurd was admitted to Hospital Medicine for management of SBP , ARF, and  Decompensated cirrhosis/ liver failure     Active Hospital Problems    Diagnosis  POA    *Spontaneous bacterial peritonitis [K65.2]  Yes    Acute liver failure without hepatic coma [K72.00]  Yes    Hepatorenal syndrome [K76.7]  Yes    Decompensated hepatic cirrhosis [K72.90]  Yes    Alcoholic cirrhosis of liver with ascites [K70.31]  Yes    Coagulopathy [D68.9]  Yes    Severe sepsis with acute organ dysfunction [A41.9, R65.20]  Yes    Anemia of chronic disease [D63.8]  Yes    Hyponatremia [E87.1]  Yes    Alcoholic hepatitis with ascites [K70.11]  Yes    Severe alcohol dependence [F10.20]  Yes    Acute pancreatitis [K85.90]  Yes    Acute renal failure with tubular necrosis [N17.0]  Yes    Macrocytic anemia [D53.9]  Yes    Severe protein-calorie malnutrition [E43]  Yes    Encounter for pre-transplant evaluation for liver transplant [Z01.818]  Not Applicable      Resolved Hospital Problems   No resolved problems to display.       Severe sepsis with organ dysfunction  SBP  - patient diagnosed on 11/21 and started on rocephin then switched to meropenem at OSH  - on 11/25, would be Day 5 of treatment, Zosyn  Started on presentation at [facility] on 11/25/2019   -  Patient underwent 4L paracentesis 11/26/2019, fluid analysis continues to show evidence of SBP with 850  WBCs in 49% segmented neutrophils.  WBC count in the periphery decreasing to 26 from 35.   -  Follow-up blood cultures- NGTD  -  Continue albumin for SBP treatment  -  MRCP on 11/26/2019 showed no pancreatic abscess,  Hepatic biliary obstruction. showing 1.4 cm pancreatic tail cyst  - will need repeat LVP/ diagnostic paracentesis on 11/29/2019 to ensure clearance of SBP  - p.o. fluconazole added on 11/28, given concern for worsening infection , given decompensating kidneys  - repeating diagnostic and therapeutic paracentesis on 11/29/2019 to ensure resolution of SBP-  "showed decrease in WBC count and resolution of SBP  - plan to treat with 5 days of Zosyn and 5 days of fluconazole  - will require ciprofloxacin prophylaxis going forward     Acute liver failure without hepatic coma  Decompensated alcohol cirrhosis with ascites  Alcohol hepatitis with ascites  Severe alcohol dependence   MELD-Na score: 34 at 11/30/2019  5:53 AM  MELD score: 32 at 11/30/2019  5:53 AM  Calculated from:  Serum Creatinine: 6.0 mg/dL (Rounded to 4 mg/dL) at 11/30/2019  5:53 AM  Serum Sodium: 128 mmol/L at 11/30/2019  5:53 AM  Total Bilirubin: 9.1 mg/dL at 11/30/2019  5:53 AM  INR(ratio): 1.4 at 11/30/2019  5:53 AM  Age: 34 years     - patient's last drink about a month ago  Prior to presentation  - viral studies OSH largely negative  - thiamine, folate   - hepatology consulted  - addiction psych consulted   - PETH pending; urine tox + for opioids   - U/S liver with doppler shows "Findings consistent with cirrhosis and sequela of portal hypertension including a large volume of ascites, splenomegaly, and collateral vessel formation"   -  Treat SBP as above  -  Treat renal dysfunction as below  - echo wnl  - obtained financial approval from insurance company for initiation of liver transplant eval- undergoing liver transplant evaluation     LORENE  Hepatorenal Syndrome  - was being treated for HRS at OSH with rise in Cr with SBP. atient also had IV contrast and had large volume paracentesis and does not appear to have gotten albumin after.  Patient also with recent pancreatitis at the outside hospital, which may be contributing to renal dysfunction  -  Creatinine 3.1 on presentation at [facility]  - urine studies- review concerning for H RS  - cont albumin and octreotide   - nephrology consulted  - midodrine started on 11/27, in attempt to hyper perfuse kidneys   - treat SBP as above.   - monitor urine output closely.  Nephrology is following.  Patient may require dialysis soon, currently no indications for RT at " this time     Acute pancreatitis - resolved  - due to alcohol   - lipase improved from OSH;  Lipase here 540-->453  - CLD--> advance diet on 11/26  - MRCP 11/26- showed 1.4 cm lesion in the tail of the pancreas as described above.  Considerations include a pancreatic cyst, small pancreatic pseudocysts, or cystic neoplasm. No acute pancreatitis      Coagulopathy due to liver disease  -  Likely due to cirrhosis/  Current decompensation, liver failure  -  fibrinogen 262  - vitamin K for 3 days .  INR trended down to 1.4 with IV vitamin K     Hyponatremia  - fluid restrict . Na 132     Macrocytic anemia  Anemia of chronic disease  - hemoglobin 11-10, . Likely due to alcoholism and liver disease  -  Hemolysis labs reviewed, show some evidence of hemolysis  -  iron studies, ferritin and folate-  Showed evidence of anemia of chronic disease  -  Continue folate     Obesity   Severe protein-calorie malnutrition  Body mass index is 36.66 kg/m²  On admission --> Body mass index is 36.59 kg/m².  -  Albumin 2.4, pre-albumin 4. Likely due to alcoholism/  Cirrhosis,  In setting of acute decompensation  -  Advancing diet as tolerated  -  Boost  -  Dietary consult    Diet:  Low Na with fluid restriction   GI PPx:  PO PPI  DVT PPx:  SCDs due to coagulopathy   Goals of Care:  Full Patient has an Advance directive which was discussed with him and his parents .  Patient has has made wishes of withdrawal of care in the case of end-stage futile measures, if that were ever case.  He is interested in all aggressive measures, full code, transplantation, at this time    High Risk Conditions:  SUGEY, SBP    Disposition:    TBD.  Undergoing liver transplant evaluation.    If patient is approved for liver transplant, may require hospitalization until his transplantation.    Given patient's decompensated status, he is low threshold for ICU admission      Patient's note was created using MModal Dictation.  Any errors in syntax may not have  been identified and edited on initial review prior to signing this note.    Signing Physician:     Mya Subramanian MD  Department of [facility]   [facility]  Pager [phone] [place] [phone]  11/30/2019

## 2019-12-01 PROBLEM — B37.7 CANDIDEMIA: Status: ACTIVE | Noted: 2019-01-01

## 2019-12-01 PROBLEM — B37.9 INFECTION DUE TO CANDIDA GLABRATA: Status: ACTIVE | Noted: 2019-01-01

## 2019-12-01 NOTE — SUBJECTIVE & OBJECTIVE
Interval History: Worsening renal function, with Scr from 7.1 to 8.3 today. Patient without uremic symptoms. Minimal UOP.     Review of patient's allergies indicates:  No Known Allergies  Current Facility-Administered Medications   Medication Frequency    acetaminophen tablet 325 mg Q4H PRN    albumin human 25% bottle 25 g TID    bisacodyl suppository 10 mg Daily PRN    ciprofloxacin HCl tablet 250 mg Daily    dextrose 10% (D10W) Bolus PRN    dextrose 10% (D10W) Bolus PRN    folic acid tablet 1 mg Daily    glucagon (human recombinant) injection 1 mg PRN    glucose chewable tablet 16 g PRN    glucose chewable tablet 24 g PRN    micafungin 100 mg in sodium chloride 0.9 % 100 mL IVPB (ready to mix system) Q24H    midodrine tablet 15 mg TID    octreotide injection 100 mcg Q8H    ondansetron tablet 8 mg Q6H PRN    polyethylene glycol packet 17 g Daily    promethazine (PHENERGAN) 12.5 mg in dextrose 5 % 50 mL IVPB Q6H PRN    senna-docusate 8.6-50 mg per tablet 1 tablet Daily    sevelamer carbonate tablet 1,600 mg TID WM    sodium chloride 0.9% flush 10 mL PRN    thiamine tablet 100 mg Daily    traMADol tablet 50 mg Q6H PRN       Objective:     Vital Signs (Most Recent):  Temp: 97.4 °F (36.3 °C) (12/01/19 1208)  Pulse: 87 (12/01/19 1208)  Resp: 17 (12/01/19 1208)  BP: (!) 115/56 (12/01/19 1208)  SpO2: 95 % (12/01/19 1208)  O2 Device (Oxygen Therapy): room air (12/01/19 0725) Vital Signs (24h Range):  Temp:  [97.4 °F (36.3 °C)-98.9 °F (37.2 °C)] 97.4 °F (36.3 °C)  Pulse:  [84-92] 87  Resp:  [17-19] 17  SpO2:  [92 %-95 %] 95 %  BP: (109-125)/(56-66) 115/56     Weight: 129.3 kg (285 lb) (11/29/19 1300)  Body mass index is 36.59 kg/m².  Body surface area is 2.6 meters squared.    I/O last 3 completed shifts:  In: 540 [P.O.:440; IV Piggyback:100]  Out: 300 [Urine:300]    Physical Exam   Constitutional: He is oriented to person, place, and time. He appears ill. No distress.   HENT:   Head: Normocephalic.    Eyes: Conjunctivae are normal. Scleral icterus is present.   Neck: Normal range of motion. Neck supple.   Cardiovascular: Normal rate and regular rhythm.   Pulmonary/Chest: Effort normal and breath sounds normal.   Abdominal: Soft. Bowel sounds are normal. He exhibits distension. He exhibits no mass. There is no tenderness. There is no guarding.   Musculoskeletal: Normal range of motion.   Neurological: He is oriented to person, place, and time.   Skin: Skin is warm and dry.   Psychiatric: He has a normal mood and affect.   Nursing note and vitals reviewed.      Significant Labs:  CBC:   Recent Labs   Lab 12/02/19 0331   WBC 27.47*   RBC 2.19*   HGB 7.9*   HCT 25.0*   PLT 68*   *   MCH 36.1*   MCHC 31.6*     CMP:   Recent Labs   Lab 12/02/19 0331   GLU 97   CALCIUM 8.6*   ALBUMIN 3.5   PROT 5.9*   *   K 5.1   CO2 16*   CL 92*   *   CREATININE 8.3*   ALKPHOS 212*   ALT 26   AST 88*   BILITOT 8.6*     All labs within the past 24 hours have been reviewed.

## 2019-12-01 NOTE — PROGRESS NOTES
[facility]  Nephrology  Progress Note    Patient Name: Luis Hurd  MRN: [id number]  Admission Date: 11/25/2019  Hospital Length of Stay: 6 days  Attending Provider: Mya Subramanian MD   Primary Care Physician: William Nettles MD  Principal Problem:Spontaneous bacterial peritonitis    Subjective:     HPI: 33 y/o man with hx of alcohol abuse, alcoholic cirrhosis decompensated with ascites,  morbid obesity, no history of CKD baseline sCr 0.7 (as of 11/20/2019), admitted on 11/25/2019 as a transfer from OSH for liver txp evaluation and LORENE, concern for HRS.  Originally presented on 11/20 to OSH with worsening abdominal pain and distention, along with increased lower extremity edema, and also diagnosed with SBP and pancreatitis.  Last drink ~1 month ago (started drinking ~15 yrs ago, with 2-3 wine bottles/day).  On admit, sCr 0.7, had paracentesis with ~6L removal, administered albumin post procedure, no report of acute hypotension during previous hospital stay, yet subsequently developed rise in sCr to 1.63 on 11/22, 2.39 on 11/23, 2.44 on 11/25, and labs on arrival to [facility] on 11/25 up to 3.1.  He was initiated in HRS treatment, receiving albumin, octreotide, and midodrine.  Ofr SBP started on ceftriaxone.      Nephrology consulted for evaluation of Lorene.      Interval History: Patient seen and examined at bedside, mentation intact, creatinine and BUN worsening    Review of patient's allergies indicates:  No Known Allergies  Current Facility-Administered Medications   Medication Frequency    acetaminophen tablet 325 mg Q4H PRN    albumin human 25% bottle 25 g TID    bisacodyl suppository 10 mg Daily PRN    ciprofloxacin HCl tablet 250 mg Daily    dextrose 10% (D10W) Bolus PRN    dextrose 10% (D10W) Bolus PRN    folic acid tablet 1 mg Daily    glucagon (human recombinant) injection 1 mg PRN    glucose chewable tablet 16 g PRN    glucose chewable tablet 24 g PRN    micafungin 100 mg in  sodium chloride 0.9 % 100 mL IVPB (ready to mix system) Q24H    midodrine tablet 15 mg TID    octreotide injection 100 mcg Q8H    ondansetron tablet 8 mg Q6H PRN    polyethylene glycol packet 17 g Daily    promethazine (PHENERGAN) 12.5 mg in dextrose 5 % 50 mL IVPB Q6H PRN    senna-docusate 8.6-50 mg per tablet 1 tablet Daily    sevelamer carbonate tablet 1,600 mg TID WM    sodium chloride 0.9% flush 10 mL PRN    thiamine tablet 100 mg Daily    traMADol tablet 50 mg Q6H PRN       Objective:     Vital Signs (Most Recent):  Temp: 98.2 °F (36.8 °C) (12/01/19 0725)  Pulse: 91(normal sinus ) (12/01/19 1110)  Resp: 19 (12/01/19 0725)  BP: (!) 120/59 (12/01/19 0725)  SpO2: (!) 94 % (12/01/19 0725)  O2 Device (Oxygen Therapy): room air (12/01/19 0725) Vital Signs (24h Range):  Temp:  [98 °F (36.7 °C)-98.9 °F (37.2 °C)] 98.2 °F (36.8 °C)  Pulse:  [84-92] 91  Resp:  [17-19] 19  SpO2:  [92 %-95 %] 94 %  BP: (109-125)/(57-66) 120/59     Weight: 129.3 kg (285 lb) (11/29/19 1300)  Body mass index is 36.59 kg/m².  Body surface area is 2.6 meters squared.    I/O last 3 completed shifts:  In: 540 [P.O.:440; IV Piggyback:100]  Out: 300 [Urine:300]    Physical Exam   Constitutional: He is oriented to person, place, and time. He appears ill. No distress.   HENT:   Head: Normocephalic.   Eyes: Conjunctivae are normal. Scleral icterus is present.   Neck: Normal range of motion. Neck supple.   Cardiovascular: Normal rate and regular rhythm.   Pulmonary/Chest: Effort normal and breath sounds normal.   Abdominal: Soft. Bowel sounds are normal. He exhibits distension. He exhibits no mass. There is no tenderness. There is no guarding.   Musculoskeletal: Normal range of motion.   Neurological: He is oriented to person, place, and time.   Skin: Skin is warm and dry.   Psychiatric: He has a normal mood and affect.       Significant Labs:  CBC:   Recent Labs   Lab 12/01/19  0415   WBC 23.73*   RBC 2.08*   HGB 7.6*   HCT 23.5*   PLT 71*    *   MCH 36.5*   MCHC 32.3     CMP:   Recent Labs   Lab 12/01/19  0415      CALCIUM 8.7   ALBUMIN 3.3*   PROT 5.4*   *   K 4.4   CO2 19*   CL 92*   BUN 99*   CREATININE 7.1*   ALKPHOS 184*   ALT 26   AST 95*   BILITOT 7.8*     All labs within the past 24 hours have been reviewed.     Significant Imaging:  Labs: Reviewed    Assessment/Plan:     Acute renal failure with tubular necrosis  Unclear at this time etiology of LORENE, but likely from HRS (hyponatremia, low/normal Bps) vs prolonged prerenal or volume depletion after paracentesis 6L removal at prior hospital  - Renal US, no significant abnormalities seen in kidneys  - U/P Cr ratio: 0.23; urine sodium<20  - Cr is worsening. UOP decreasing    Plan:  - no need for RRt today. Will likely need it soon as creatinine is trending up, consented for RRT, signed consent in chart  - IR Paracentesis performed on 11/26, 4 liters removed. Repeat paracentesis on 11/29 removed 6 liters.  - Strict I/Os and chart  - Agree with HRS protocol; aim for BP MAP >85  - Hb > 7 gm/dL  - Avoid nephrotoxic agents, NSAIDs, IV contrast, etc  - Will follow closely      Hyponatremia  Likely from his liver failure and volume overload (hypervolemic hyponatremia)   Refer to LORENE    Decompensated hepatic cirrhosis  Per primary team  Liver team evaluation for txp  MELD-Na score: 33 at 12/1/2019  4:15 AM  MELD score: 30 at 12/1/2019  4:15 AM  Calculated from:  Serum Creatinine: 7.1 mg/dL (Rounded to 4 mg/dL) at 12/1/2019  4:15 AM  Serum Sodium: 128 mmol/L at 12/1/2019  4:15 AM  Total Bilirubin: 7.8 mg/dL at 12/1/2019  4:15 AM  INR(ratio): 1.3 at 12/1/2019  4:15 AM  Age: 34 years          Thank you for your consult. I will follow-up with patient. Please contact us if you have any additional questions.    Rito Laird MD  Nephrology  [facility]

## 2019-12-01 NOTE — HPI
33 y/o M PMhx alcoholic cirrhosis c/b ascites on diuretics, morbid obesity who presented to OSH 11/20 w/ worsening abdominal pain and distension with LE edema s/p transfer to [facility] for liver txp evaluation and LORENE. Patient was empirically started on ceftriaxone for SBP. Afterwards underwent paracentesis 11/21 w/ removal 6.2L (820 segs (816 corrected)) & given albumin. CT abd/pelvis w/ IV cont 11/21 revealed mild diffuse fluid or stranding about pancreas & lipase 700. Consequently diagnosed w/ pancreatitis. He subsequently developed LORENE and he was started on treatment for presumed HRS. Given worsening leukocytosis (16.5->27) ID was consulted and antibiotics broadened to meropenem. Patient transferred to [facility] for liver transplant evaluation. Patient underwent transplant evaluation, however, hospital course complicated by peritonitis s/p course of abx w/ transition to SBP ppx, candida glabrata fungemia (blood cx 11/29 in 4/4 bottles, 12/2 NGTD, 12/3 in 1/4 bottles, 12/5 NGTD), (ascites cx 11/26, 11/29, 12/3 all NGTD), LORENE requiring CRRT, and hypotension requiring pressors. ID is re-consulted for persistent fungemia prior to transplantation.

## 2019-12-01 NOTE — PLAN OF CARE
Patient OOB and in chair for lunch. Family at bedside through most of the day and involved in care. Patient currently in US for swelling of left arm. VS stable. PRN pain medication administered X1 today with moderate effectiveness. Plan of care reviewed with patient and family, questions answered.

## 2019-12-01 NOTE — ASSESSMENT & PLAN NOTE
Per primary team  Liver team evaluation for txp  MELD-Na score: 33 at 12/1/2019  4:15 AM  MELD score: 30 at 12/1/2019  4:15 AM  Calculated from:  Serum Creatinine: 7.1 mg/dL (Rounded to 4 mg/dL) at 12/1/2019  4:15 AM  Serum Sodium: 128 mmol/L at 12/1/2019  4:15 AM  Total Bilirubin: 7.8 mg/dL at 12/1/2019  4:15 AM  INR(ratio): 1.3 at 12/1/2019  4:15 AM  Age: 34 years

## 2019-12-01 NOTE — ASSESSMENT & PLAN NOTE
Unclear at this time etiology of LORENE, but likely from HRS (hyponatremia, low/normal Bps) vs prolonged prerenal or volume depletion after paracentesis 6L removal at prior hospital  - Renal US, no significant abnormalities seen in kidneys  - U/P Cr ratio: 0.23; urine sodium<20  - Cr is worsening. UOP decreasing    Plan:  - no need for RRt today. Will likely need it soon as creatinine is trending up, consented for RRT, signed consent in chart  - IR Paracentesis performed on 11/26, 4 liters removed. Repeat paracentesis on 11/29 removed 6 liters.  - Strict I/Os and chart  - Agree with HRS protocol; aim for BP MAP >85  - Hb > 7 gm/dL  - Avoid nephrotoxic agents, NSAIDs, IV contrast, etc  - Will follow closely

## 2019-12-01 NOTE — PLAN OF CARE
Plan of care reviewed with patient. No acute changes overnight. PRN pain meds given as ordered. Safety maintained, call light in reach, bed in lowest position, will continue to monitor.

## 2019-12-01 NOTE — SUBJECTIVE & OBJECTIVE
Interval History: Patient seen and examined at bedside, mentation intact, creatinine and BUN worsening    Review of patient's allergies indicates:  No Known Allergies  Current Facility-Administered Medications   Medication Frequency    acetaminophen tablet 325 mg Q4H PRN    albumin human 25% bottle 25 g TID    bisacodyl suppository 10 mg Daily PRN    ciprofloxacin HCl tablet 250 mg Daily    dextrose 10% (D10W) Bolus PRN    dextrose 10% (D10W) Bolus PRN    folic acid tablet 1 mg Daily    glucagon (human recombinant) injection 1 mg PRN    glucose chewable tablet 16 g PRN    glucose chewable tablet 24 g PRN    micafungin 100 mg in sodium chloride 0.9 % 100 mL IVPB (ready to mix system) Q24H    midodrine tablet 15 mg TID    octreotide injection 100 mcg Q8H    ondansetron tablet 8 mg Q6H PRN    polyethylene glycol packet 17 g Daily    promethazine (PHENERGAN) 12.5 mg in dextrose 5 % 50 mL IVPB Q6H PRN    senna-docusate 8.6-50 mg per tablet 1 tablet Daily    sevelamer carbonate tablet 1,600 mg TID WM    sodium chloride 0.9% flush 10 mL PRN    thiamine tablet 100 mg Daily    traMADol tablet 50 mg Q6H PRN       Objective:     Vital Signs (Most Recent):  Temp: 98.2 °F (36.8 °C) (12/01/19 0725)  Pulse: 91(normal sinus ) (12/01/19 1110)  Resp: 19 (12/01/19 0725)  BP: (!) 120/59 (12/01/19 0725)  SpO2: (!) 94 % (12/01/19 0725)  O2 Device (Oxygen Therapy): room air (12/01/19 0725) Vital Signs (24h Range):  Temp:  [98 °F (36.7 °C)-98.9 °F (37.2 °C)] 98.2 °F (36.8 °C)  Pulse:  [84-92] 91  Resp:  [17-19] 19  SpO2:  [92 %-95 %] 94 %  BP: (109-125)/(57-66) 120/59     Weight: 129.3 kg (285 lb) (11/29/19 1300)  Body mass index is 36.59 kg/m².  Body surface area is 2.6 meters squared.    I/O last 3 completed shifts:  In: 540 [P.O.:440; IV Piggyback:100]  Out: 300 [Urine:300]    Physical Exam   Constitutional: He is oriented to person, place, and time. He appears ill. No distress.   HENT:   Head: Normocephalic.    Eyes: Conjunctivae are normal. Scleral icterus is present.   Neck: Normal range of motion. Neck supple.   Cardiovascular: Normal rate and regular rhythm.   Pulmonary/Chest: Effort normal and breath sounds normal.   Abdominal: Soft. Bowel sounds are normal. He exhibits distension. He exhibits no mass. There is no tenderness. There is no guarding.   Musculoskeletal: Normal range of motion.   Neurological: He is oriented to person, place, and time.   Skin: Skin is warm and dry.   Psychiatric: He has a normal mood and affect.       Significant Labs:  CBC:   Recent Labs   Lab 12/01/19 0415   WBC 23.73*   RBC 2.08*   HGB 7.6*   HCT 23.5*   PLT 71*   *   MCH 36.5*   MCHC 32.3     CMP:   Recent Labs   Lab 12/01/19 0415      CALCIUM 8.7   ALBUMIN 3.3*   PROT 5.4*   *   K 4.4   CO2 19*   CL 92*   BUN 99*   CREATININE 7.1*   ALKPHOS 184*   ALT 26   AST 95*   BILITOT 7.8*     All labs within the past 24 hours have been reviewed.     Significant Imaging:  Labs: Reviewed

## 2019-12-01 NOTE — PROGRESS NOTES
Progress Note  Hospital Medicine  [facility] [place]         Patient Name: Luis Hurd  MRN:  [id number]  Hospital Medicine Team: [facility] [place] HOSP MED L Mya Subramanian MD  Date of Admission:  11/25/2019     Length of Stay:  LOS: 6 days   Expected Discharge Date: 12/4/2019  Principal Problem:  Spontaneous bacterial peritonitis     Subjective:     Interval History/Overnight Events:    Doing well today,Mental status at baseline. No resp distress.  No fevers or chills. VSS  Blood cx from 11/29 with 4/4 bottles with candida glabrata.  Patient has been on empiric PO fluconazole therapy for possible fungal SBP, fluc stopped. micafungin started.  ID consulted. Cr up to 7.1, with stable lytes otherwise and no significant volume overload. Likely nearing HD, not indication today.   Patient and family updated at bedside      ciprofloxacin HCl  250 mg Oral Daily    folic acid  1 mg Oral Daily    micafungin (MYCAMINE) IVPB  100 mg Intravenous Q24H    midodrine  15 mg Oral TID    octreotide  100 mcg Subcutaneous Q8H    polyethylene glycol  17 g Oral Daily    senna-docusate 8.6-50 mg  1 tablet Oral Daily    sevelamer carbonate  1,600 mg Oral TID WM    thiamine  100 mg Oral Daily           acetaminophen, bisacodyl, Dextrose 10% Bolus, Dextrose 10% Bolus, glucagon (human recombinant), glucose, glucose, ondansetron, promethazine (PHENERGAN) IVPB, sodium chloride 0.9%, traMADol    Review of Systems   Constitutional: Negative for chills, fatigue, fever.   HENT: Negative for sore throat, trouble swallowing.    Eyes: Negative for photophobia, visual disturbance.   Respiratory: Negative for cough, shortness of breath.    Cardiovascular: Negative for chest pain, palpitations, leg swelling.   Gastrointestinal: Negative for abdominal pain, constipation, diarrhea, nausea, vomiting.   Endocrine: Negative for cold intolerance, heat intolerance.   Genitourinary: Negative for dysuria, frequency.   Musculoskeletal:  Negative for arthralgias, myalgias.   Skin: Negative for rash, wound, erythema   Neurological: Negative for dizziness, syncope, weakness, light-headedness.   Psychiatric/Behavioral: Negative for confusion, hallucinations, anxiety  All other systems reviewed and are negative.    Objective:     Temp:  [97.4 °F (36.3 °C)-98.9 °F (37.2 °C)]   Pulse:  [84-91]   Resp:  [16-19]   BP: (108-125)/(56-66)   SpO2:  [92 %-95 %]       Physical Exam:  Constitutional: appears older than stated age, chronically ill looking,  non-distressed, not diaphoretic.   HENT: NC/AT, external ears normal, oropharynx clear, MMM w/o exudates.   Eyes: PERRL, EOMI, conjunctiva normal, no discharge b/l, +scleral icterus   Neck: normal ROM, supple  CV: RRR, no m/r/g, no carotid bruits, +2 peripheral pulses.  Pulmonary/Chest wall: Breathing comfortably w/o distress, CTAB, no w/r/r, no crackles.    GI: Soft, non-tender, (+) BS, (+) BM   +distended,  Caput medusa present,  Obese abdomen  Musculoskeletal: Normal ROM, no atrophy,  +trace pitting edema in LE bilaterally   Neurological: AAO x 4, CN II-XI in tact, nl sensation, nl strength/tone  No asterixis   Skin: warm, dry   +pallor, jaundice, + spider angiomas, +bruising   Psych: normal mood and affect, normal behavior, thought content and judgement.    Labs:    Chemistries:   Recent Labs   Lab 11/29/19  0637 11/29/19  1635 11/30/19  0553 12/01/19  0415   * 130* 128* 128*   K 4.5 4.3 4.3 4.4   CL 94* 93* 92* 92*   CO2 23 21* 23 19*   BUN 87* 88* 91* 99*   CREATININE 5.0* 5.5* 6.0* 7.1*   CALCIUM 8.7 8.9 8.6* 8.7   PROT 5.2*  --  5.3* 5.4*   BILITOT 10.6*  --  9.1* 7.8*   ALKPHOS 209*  --  194* 184*   ALT 36  --  32 26   *  --  110* 95*   MG 3.1*  --  3.0* 3.1*   PHOS 6.3*  --  6.3* 6.5*        WBC:   Recent Labs   Lab 11/28/19  0545 11/29/19  0637 11/29/19  1635 11/30/19  0553 12/01/19  0415   WBC 27.69* 40.97* 34.15* 25.78* 23.73*     Bands:     CBC/Anemia Labs: Coags:    Recent Labs   Lab  11/26/19  1005  11/29/19  1635 11/30/19  0553 12/01/19  0415   WBC 25.97*   < > 34.15* 25.78* 23.73*   HGB 10.1*   < > 8.6* 7.9* 7.6*   HCT 30.3*   < > 26.0* 24.5* 23.5*      < > 112* 79* 71*   *   < > 114* 113* 113*   RDW 13.9   < > 14.3 14.5 14.4   IRON 36*  --   --   --   --    FERRITIN 2,782*  --   --   --   --    FOLATE 7.6  --   --   --   --    [id number] >2000*  --   --   --   --     < > = values in this interval not displayed.    Recent Labs   Lab 11/29/19  0637 11/30/19  0553 12/01/19  0415   INR 1.4* 1.4* 1.3*        Diagnostic Results:       Ref. Range 11/26/2019 07:45 11/29/2019 15:56   Fluid Color Unknown Yellow Yellow   Fluid Appearance Unknown Cloudy Cloudy   Body Fluid Type Unknown Ascites Ascites   WBC, Body Fluid Latest Units: /cu mm 855 468   Segs, Fluid Latest Units: % 49 27   Lymphs, Fluid Latest Units: % 13 36   Monocytes/Macrophages, Fluid Latest Units: % 37 34   Eos, Fluid Latest Units: %  2     Blood culture [[id number]] (Abnormal) Collected: 11/29/19 1143   Order Status: Completed Specimen: Blood Updated: 12/01/19 1206    Blood Culture, Routine Gram stain teresa bottle: yeast      Positive results previously called 12/01/2019  00:11     RITA GLABRATA   ID consult required at [facility] [facility] and [place] locations.   For susceptibility see order #[id number]   Abnormal    Narrative:     Collection has been rescheduled by VHT at 11/29/2019 11:02 Reason:   patient in bathroom.   Collection has been rescheduled by VHT at 11/29/2019 11:19 Reason:   Unable to collect  Collection has been rescheduled by VHT at 11/29/2019 11:02 Reason:   patient in bathroom.   Collection has been rescheduled by VHT at 11/29/2019 11:19 Reason:   Unable to collect         Assessment and Plan     Hospital Course:    Mr. Luis Hurd was admitted to Hospital Medicine for management of SBP , ARF, and  Decompensated cirrhosis/ liver failure     Active Hospital Problems    Diagnosis  POA    *Spontaneous  bacterial peritonitis [K65.2]  Yes    Acute liver failure without hepatic coma [K72.00]  Yes    Hepatorenal syndrome [K76.7]  Yes    Decompensated hepatic cirrhosis [K72.90]  Yes    Alcoholic cirrhosis of liver with ascites [K70.31]  Yes    Coagulopathy [D68.9]  Yes    Severe sepsis with acute organ dysfunction [A41.9, R65.20]  Yes    Anemia of chronic disease [D63.8]  Yes    Hyponatremia [E87.1]  Yes    Alcoholic hepatitis with ascites [K70.11]  Yes    Severe alcohol dependence [F10.20]  Yes    Acute pancreatitis [K85.90]  Yes    Acute renal failure with tubular necrosis [N17.0]  Yes    Macrocytic anemia [D53.9]  Yes    Severe protein-calorie malnutrition [E43]  Yes    Encounter for pre-transplant evaluation for liver transplant [Z01.818]  Not Applicable      Resolved Hospital Problems   No resolved problems to display.       Severe sepsis with organ dysfunction  SBP  - patient diagnosed on 11/21 and started on rocephin then switched to meropenem at OSH  - on 11/25, would be Day 5 of treatment, Zosyn  Started on presentation at [facility] on 11/25/2019   -  Patient underwent 4L paracentesis 11/26/2019, fluid analysis continues to show evidence of SBP with 850  WBCs in 49% segmented neutrophils.  WBC count in the periphery decreasing to 26 from 35.   -  Follow-up blood cultures- NGTD  -  Continue albumin for SBP treatment  -  MRCP on 11/26/2019 showed no pancreatic abscess,  Hepatic biliary obstruction. showing 1.4 cm pancreatic tail cyst  - will need repeat LVP/ diagnostic paracentesis on 11/29/2019 to ensure clearance of SBP  - p.o. fluconazole added on 11/28, given concern for worsening infection , given decompensating kidneys  - repeating diagnostic and therapeutic paracentesis on 11/29/2019 to ensure resolution of SBP- showed decrease in WBC count and resolution of SBP  - plan to treat with 5 days of Zosyn and 5 days of fluconazole  - will require ciprofloxacin prophylaxis going  "forward\    Candidemia due to Candida Glabrata  - Blood cx from 11/29 with 4/4 bottles with candida glabrata, resulted on 12/1.  Patient has been on empiric PO fluconazole therapy for possible fungal SBP, fluc stopped.   - source likely intra abdominal   - IV micafungin started on 12/1.   ID consulted.   - already had a TTE on 11/29 which showed no fungal mass  - will plan to consult ophtho on 12/2     Acute liver failure without hepatic coma  Decompensated alcohol cirrhosis with ascites  Alcohol hepatitis with ascites  Severe alcohol dependence   MELD-Na score: 33 at 12/1/2019  4:15 AM  MELD score: 30 at 12/1/2019  4:15 AM  Calculated from:  Serum Creatinine: 7.1 mg/dL (Rounded to 4 mg/dL) at 12/1/2019  4:15 AM  Serum Sodium: 128 mmol/L at 12/1/2019  4:15 AM  Total Bilirubin: 7.8 mg/dL at 12/1/2019  4:15 AM  INR(ratio): 1.3 at 12/1/2019  4:15 AM  Age: 34 years     - patient's last drink about a month ago  Prior to presentation  - viral studies OSH largely negative  - thiamine, folate   - hepatology consulted  - addiction psych consulted   - PETH pending; urine tox + for opioids   - U/S liver with doppler shows "Findings consistent with cirrhosis and sequela of portal hypertension including a large volume of ascites, splenomegaly, and collateral vessel formation"   -  Treat SBP as above  -  Treat renal dysfunction as below  - echo wnl  - obtained financial approval from insurance company for initiation of liver transplant eval- undergoing liver transplant evaluation     LORENE  Hepatorenal Syndrome  - was being treated for HRS at OSH with rise in Cr with SBP. atient also had IV contrast and had large volume paracentesis and does not appear to have gotten albumin after.  Patient also with recent pancreatitis at the outside hospital, which may be contributing to renal dysfunction  -  Creatinine 3.1 on presentation at [facility]  - urine studies- review concerning for H RS  - cont albumin and octreotide   - nephrology " consulted  - midodrine started on 11/27, in attempt to hyper perfuse kidneys   - treat SBP and fungemia as above  - monitor urine output closely.  Nephrology is following.  Patient may require dialysis soon, currently no indications for RT at this time. Cr up to 7 on 12/1     Acute pancreatitis - resolved  - due to alcohol   - lipase improved from OSH;  Lipase here 540-->453  - CLD--> advance diet on 11/26  - MRCP 11/26- showed 1.4 cm lesion in the tail of the pancreas as described above.  Considerations include a pancreatic cyst, small pancreatic pseudocysts, or cystic neoplasm. No acute pancreatitis      Coagulopathy due to liver disease  -  Likely due to cirrhosis/  Current decompensation, liver failure  -  fibrinogen 262  - vitamin K for 3 days .  INR trended down to 1.4 with IV vitamin K     Hyponatremia  - fluid restrict . Na 132     Macrocytic anemia  Anemia of chronic disease  - hemoglobin 11-10, . Likely due to alcoholism and liver disease  -  Hemolysis labs reviewed, show some evidence of hemolysis  -  iron studies, ferritin and folate-  Showed evidence of anemia of chronic disease  -  Continue folate     Obesity   Severe protein-calorie malnutrition  Body mass index is 36.66 kg/m²  On admission --> Body mass index is 36.59 kg/m².  -  Albumin 2.4, pre-albumin 4. Likely due to alcoholism/  Cirrhosis,  In setting of acute decompensation  -  Advancing diet as tolerated  -  Boost  -  Dietary consult    Diet:  Low Na with fluid restriction   GI PPx:  PO PPI  DVT PPx:  SCDs due to coagulopathy   Goals of Care:  Full Patient has an Advance directive which was discussed with him and his parents .  Patient has has made wishes of withdrawal of care in the case of end-stage futile measures, if that were ever case.  He is interested in all aggressive measures, full code, transplantation, at this time    High Risk Conditions:  [facility], SBP    Disposition:    TBD.  Undergoing liver transplant evaluation.    If  patient is approved for liver transplant, may require hospitalization until his transplantation.    Given patient's decompensated status, he is low threshold for ICU admission      Patient's note was created using MModal Dictation.  Any errors in syntax may not have been identified and edited on initial review prior to signing this note.    Signing Physician:     Mya Subramanian MD  Department of Hospital Medicine   [facility] [place] [facility]  Pager [phone] [place] [phone]  12/1/2019

## 2019-12-02 NOTE — ASSESSMENT & PLAN NOTE
35 y/o M PMhx alcoholic cirrhosis c/b ascites on diuretics, morbid obesity who presented to OSH 11/20 w/ worsening abdominal pain and distension with LE edema s/p transfer to [facility] for liver txp evaluation and LORENE. Patient was empirically started on ceftriaxone for SBP. Afterwards underwent paracentesis 11/21 w/ removal 6.2L (820 segs (815 corrected)) & given albumin. CT abd/pelvis w/ IV cont 11/21 revealed mild diffuse fluid or stranding about pancreas & lipase 700. Consequently diagnosed w/ pancreatitis. He subsequently developed LORENE and he was started on treatment for presumed HRS. Given worsening leukocytosis (16.5->27) ID was consulted and antibiotics broadened to meropenem. Patient transferred to [facility] for liver transplant evaluation. Additionally patient now w/ fungemia (candida glabrata). ID is consulted for antibiotic recommendations. He received >7 days antibiotics w/ paracentesis 11/29 showing resolution of peritonitis (126 segs from 815). TTE unremarkable. Fungemia likely 2/2 GI translocation vs line infection.    Recommendations  C/w micafungin  Would remove midline and proceed w/ line holiday for 2 days prior to placing another form of long term access while awaiting repeat cx  F/u repeat blood cultures  No signs of fungal endophthalmitis per ophtho, appreciate assistance  C/w SBP prophylaxis    Transplant work-up  Hepatitis B core, surface Ag, surface Ab negative  Hepatitis A IgG positive  Hepatitis C Ab negative  Varicella Zoster IgG positive  Strongyloides Ab IgG negative  CMV IgG positive  HIV negative  RPR non-reactive  States has received vaccinations through grade school    Needs  Hepatitis B vaccination  F/u Tspot  Prevnar followed by Pneumovax 8 weeks later  Shingrix  Tdap    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 Will review the above vaccinations regarding any temporal relationships with GBS and will discuss with patient tomorrow.

## 2019-12-02 NOTE — SUBJECTIVE & OBJECTIVE
Interval History:   - reports he is feeling well, denies any complaints today.  No abdominal pain, nausea or vomiting. Tolerating p.o. without any issue.  - denies any visual complaints, awaiting follow-up examination from Ophthalmology after eyes were dilated.  - WBC rising slightly to 27  - INR stable 1.3, bilirubin stable 8.6  - creatinine rising to 8.3, oliguric    Current Facility-Administered Medications   Medication    acetaminophen tablet 325 mg    bisacodyl suppository 10 mg    ciprofloxacin HCl tablet 250 mg    dextrose 10% (D10W) Bolus    dextrose 10% (D10W) Bolus    folic acid tablet 1 mg    glucagon (human recombinant) injection 1 mg    glucose chewable tablet 16 g    glucose chewable tablet 24 g    micafungin 100 mg in sodium chloride 0.9 % 100 mL IVPB (ready to mix system)    midodrine tablet 15 mg    ondansetron tablet 8 mg    polyethylene glycol packet 17 g    promethazine (PHENERGAN) 12.5 mg in dextrose 5 % 50 mL IVPB    senna-docusate 8.6-50 mg per tablet 1 tablet    sevelamer carbonate tablet 1,600 mg    sodium chloride 0.9% flush 10 mL    thiamine tablet 100 mg    traMADol tablet 50 mg       Objective:     Vital Signs (Most Recent):  Temp: 97.6 °F (36.4 °C) (12/02/19 1140)  Pulse: 89 (12/02/19 1140)  Resp: 19 (12/02/19 1140)  BP: (!) 111/55 (12/02/19 1140)  SpO2: (!) 94 % (12/02/19 1140) Vital Signs (24h Range):  Temp:  [97 °F (36.1 °C)-98.5 °F (36.9 °C)] 97.6 °F (36.4 °C)  Pulse:  [] 89  Resp:  [16-19] 19  SpO2:  [92 %-100 %] 94 %  BP: (108-137)/(55-64) 111/55     Weight: 129.3 kg (285 lb) (11/29/19 1300)  Body mass index is 36.59 kg/m².    Physical Exam   Constitutional: He is oriented to person, place, and time. He appears well-nourished. No distress.   Appears comfortable, accompanied by family.  Watching television.  Alert and oriented.   HENT:   Mouth/Throat: Oropharynx is clear and moist.   Eyes: Scleral icterus is present.   Pulmonary/Chest: Effort normal. No  respiratory distress.   Abdominal: Soft. He exhibits no distension. There is no tenderness.   Soft, nondistended or tender.   Musculoskeletal: He exhibits no edema or tenderness.   Neurological: He is alert and oriented to person, place, and time.   Skin: Skin is warm. Capillary refill takes less than 2 seconds. He is not diaphoretic.   Psychiatric: He has a normal mood and affect.   Nursing note and vitals reviewed.      MELD-Na score: 33 at 12/2/2019  3:31 AM  MELD score: 31 at 12/2/2019  3:31 AM  Calculated from:  Serum Creatinine: 8.3 mg/dL (Rounded to 4 mg/dL) at 12/2/2019  3:31 AM  Serum Sodium: 129 mmol/L at 12/2/2019  3:31 AM  Total Bilirubin: 8.6 mg/dL at 12/2/2019  3:31 AM  INR(ratio): 1.3 at 12/2/2019  3:31 AM  Age: 34 years    Significant Labs:  Labs within the past month have been reviewed.    Significant Imaging:  Labs: Reviewed

## 2019-12-02 NOTE — ASSESSMENT & PLAN NOTE
Unclear at this time etiology of LORENE, but likely from HRS (hyponatremia, low/normal Bps) vs prolonged prerenal or volume depletion after paracentesis 6L removal at prior hospital  - Renal US, no significant abnormalities seen in kidneys  - U/P Cr ratio: 0.23; urine sodium<20  - Cr is worsening. UOP decreasing    Plan:  - No need for acute RRT today. No uremic symptoms, or altered mental status. Low threshold for RRT. Will monitor daily.   - Continue to monitor UOP. Currently oliguric.   - Strict I/Os and chart  - MAP >65  - Hb > 7 gm/dL  - Avoid nephrotoxic agents, NSAIDs, IV contrast, etc  - Will follow closely

## 2019-12-02 NOTE — PROGRESS NOTES
[facility]  Nephrology  Progress Note    Patient Name: Luis Hurd  MRN: [id number]  Admission Date: 11/25/2019  Hospital Length of Stay: 7 days  Attending Provider: Mya Subramanian MD   Primary Care Physician: William Nettles MD  Principal Problem:Spontaneous bacterial peritonitis    Subjective:     HPI: 35 y/o man with hx of alcohol abuse, alcoholic cirrhosis decompensated with ascites,  morbid obesity, no history of CKD baseline sCr 0.7 (as of 11/20/2019), admitted on 11/25/2019 as a transfer from OSH for liver txp evaluation and LORENE, concern for HRS.  Originally presented on 11/20 to OSH with worsening abdominal pain and distention, along with increased lower extremity edema, and also diagnosed with SBP and pancreatitis.  Last drink ~1 month ago (started drinking ~15 yrs ago, with 2-3 wine bottles/day).  On admit, sCr 0.7, had paracentesis with ~6L removal, administered albumin post procedure, no report of acute hypotension during previous hospital stay, yet subsequently developed rise in sCr to 1.63 on 11/22, 2.39 on 11/23, 2.44 on 11/25, and labs on arrival to C on 11/25 up to 3.1.  He was initiated in HRS treatment, receiving albumin, octreotide, and midodrine.  Ofr SBP started on ceftriaxone.      Nephrology consulted for evaluation of Lorene.      Interval History: Worsening renal function, with Scr from 7.1 to 8.3 today. Patient without uremic symptoms. Minimal UOP.     Review of patient's allergies indicates:  No Known Allergies  Current Facility-Administered Medications   Medication Frequency    acetaminophen tablet 325 mg Q4H PRN    albumin human 25% bottle 25 g TID    bisacodyl suppository 10 mg Daily PRN    ciprofloxacin HCl tablet 250 mg Daily    dextrose 10% (D10W) Bolus PRN    dextrose 10% (D10W) Bolus PRN    folic acid tablet 1 mg Daily    glucagon (human recombinant) injection 1 mg PRN    glucose chewable tablet 16 g PRN    glucose chewable tablet 24 g PRN     micafungin 100 mg in sodium chloride 0.9 % 100 mL IVPB (ready to mix system) Q24H    midodrine tablet 15 mg TID    octreotide injection 100 mcg Q8H    ondansetron tablet 8 mg Q6H PRN    polyethylene glycol packet 17 g Daily    promethazine (PHENERGAN) 12.5 mg in dextrose 5 % 50 mL IVPB Q6H PRN    senna-docusate 8.6-50 mg per tablet 1 tablet Daily    sevelamer carbonate tablet 1,600 mg TID WM    sodium chloride 0.9% flush 10 mL PRN    thiamine tablet 100 mg Daily    traMADol tablet 50 mg Q6H PRN       Objective:     Vital Signs (Most Recent):  Temp: 97.4 °F (36.3 °C) (12/01/19 1208)  Pulse: 87 (12/01/19 1208)  Resp: 17 (12/01/19 1208)  BP: (!) 115/56 (12/01/19 1208)  SpO2: 95 % (12/01/19 1208)  O2 Device (Oxygen Therapy): room air (12/01/19 0725) Vital Signs (24h Range):  Temp:  [97.4 °F (36.3 °C)-98.9 °F (37.2 °C)] 97.4 °F (36.3 °C)  Pulse:  [84-92] 87  Resp:  [17-19] 17  SpO2:  [92 %-95 %] 95 %  BP: (109-125)/(56-66) 115/56     Weight: 129.3 kg (285 lb) (11/29/19 1300)  Body mass index is 36.59 kg/m².  Body surface area is 2.6 meters squared.    I/O last 3 completed shifts:  In: 540 [P.O.:440; IV Piggyback:100]  Out: 300 [Urine:300]    Physical Exam   Constitutional: He is oriented to person, place, and time. He appears ill. No distress.   HENT:   Head: Normocephalic.   Eyes: Conjunctivae are normal. Scleral icterus is present.   Neck: Normal range of motion. Neck supple.   Cardiovascular: Normal rate and regular rhythm.   Pulmonary/Chest: Effort normal and breath sounds normal.   Abdominal: Soft. Bowel sounds are normal. He exhibits distension. He exhibits no mass. There is no tenderness. There is no guarding.   Musculoskeletal: Normal range of motion.   Neurological: He is oriented to person, place, and time.   Skin: Skin is warm and dry.   Psychiatric: He has a normal mood and affect.   Nursing note and vitals reviewed.      Significant Labs:  CBC:   Recent Labs   Lab 12/02/19  0331   WBC 27.47*   RBC  2.19*   HGB 7.9*   HCT 25.0*   PLT 68*   *   MCH 36.1*   MCHC 31.6*     CMP:   Recent Labs   Lab 12/02/19  0331   GLU 97   CALCIUM 8.6*   ALBUMIN 3.5   PROT 5.9*   *   K 5.1   CO2 16*   CL 92*   *   CREATININE 8.3*   ALKPHOS 212*   ALT 26   AST 88*   BILITOT 8.6*     All labs within the past 24 hours have been reviewed.         Assessment/Plan:     * Spontaneous bacterial peritonitis  Management per primary team    Acute renal failure with tubular necrosis  Unclear at this time etiology of LORENE, but likely from HRS (hyponatremia, low/normal Bps) vs prolonged prerenal or volume depletion after paracentesis 6L removal at prior hospital  - Renal US, no significant abnormalities seen in kidneys  - U/P Cr ratio: 0.23; urine sodium<20  - Cr is worsening. UOP decreasing    Plan:  - No need for acute RRT today. No uremic symptoms, or altered mental status. Low threshold for RRT. Will monitor daily.   - Continue to monitor UOP. Currently oliguric.   - Strict I/Os and chart  - MAP >65  - Hb > 7 gm/dL  - Avoid nephrotoxic agents, NSAIDs, IV contrast, etc  - Will follow closely      Hyponatremia  Likely from his liver failure and volume overload. Refer to LORENE    Decompensated hepatic cirrhosis  Per primary team  Liver team evaluation for txp          Thank you for your consult. I will follow-up with patient. Please contact us if you have any additional questions.    Conor King MD  Nephrology  [facility]    ATTENDING PHYSICIAN ATTESTATION  I have personally verified the history and examined the patient. I thoroughly reviewed the demographic, clinical, laboratorial and imaging information available in medical records. I agree with the assessment and recommendations provided by the subspecialty resident who was under my supervision.

## 2019-12-02 NOTE — ASSESSMENT & PLAN NOTE
33 y/o M PMhx alcoholic cirrhosis c/b ascites on diuretics, morbid obesity who presented to OSH 11/20 w/ worsening abdominal pain and distension with LE edema s/p transfer [place] [facility] [place]p [place] and LORENE. Patient was empirically started on ceftriaxone for SBP. Afterwards underwent paracentesis 11/21 w/ removal 6.2L (820 segs (815 corrected)) & given albumin. CT abd/pelvis w/ IV cont 11/21 revealed mild diffuse fluid or stranding about pancreas & lipase 700. Consequently diagnosed w/ pancreatitis. He subsequently developed LORENE and he was started on treatment for presumed HRS. Given worsening leukocytosis (16.5->27) ID was consulted and antibiotics broadened to meropenem. Patient transferred [place] [facility] [place] evaluation. Additionally patient now w/ fungemia (candida glabrata). ID is consulted for antibiotic recommendations. He received >7 days antibiotics w/ paracentesis 11/29 showing resolution of peritonitis (126 segs from 815). TTE unremarkable. Fungemia likely 2/2 GI translocation.    Recommendations  C/w micafungin  F/u repeat blood cultures  Consult ophthalmology to r/o endophthalmitis  C/w SBP prophylaxis    Transplant work-up  Hepatitis B core, surface Ag, surface Ab negative  Hepatitis A IgG positive  Hepatitis C Ab negative  Varicella Zoster IgG positive  Strongyloides Ab IgG negative  CMV IgG positive  HIV negative  RPR non-reactive  States has received vaccinations through grade school    Needs  Hepatitis B vaccination  F/u Tspot  Prevnar followed by Pneumovax 8 weeks later  Shingrix  Tdap

## 2019-12-02 NOTE — SUBJECTIVE & OBJECTIVE
Interval History: Repeat blood cx performed today. Optho consulted to r/o endophthalmitis.     Review of Systems   Constitutional: Positive for activity change and fatigue. Negative for appetite change and fever.   HENT: Negative for trouble swallowing.    Eyes: Negative for photophobia.   Respiratory: Negative for cough and shortness of breath.    Cardiovascular: Positive for leg swelling. Negative for chest pain.   Gastrointestinal: Positive for abdominal distention. Negative for abdominal pain, blood in stool, constipation, diarrhea, nausea and vomiting.   Genitourinary: Negative for flank pain.   Musculoskeletal: Negative for arthralgias and back pain.   Skin: Positive for color change.   Allergic/Immunologic: Positive for immunocompromised state.   Psychiatric/Behavioral: Negative for agitation and confusion.     Objective:     Vital Signs (Most Recent):  Temp: 96.6 °F (35.9 °C) (12/02/19 1647)  Pulse: 91 (12/02/19 1647)  Resp: 19 (12/02/19 1647)  BP: (!) 112/54 (12/02/19 1647)  SpO2: (!) 94 % (12/02/19 1647) Vital Signs (24h Range):  Temp:  [96.6 °F (35.9 °C)-98.5 °F (36.9 °C)] 96.6 °F (35.9 °C)  Pulse:  [] 91  Resp:  [17-19] 19  SpO2:  [92 %-100 %] 94 %  BP: (109-137)/(54-64) 112/54     Weight: 129.3 kg (285 lb)  Body mass index is 36.59 kg/m².    Estimated Creatinine Clearance: 17.9 mL/min (A) (based on SCr of 8.3 mg/dL (H)).    Physical Exam   Constitutional: He is oriented to person, place, and time. No distress.   HENT:   Head: Normocephalic and atraumatic.   Mouth/Throat: No oropharyngeal exudate.   Eyes: Right eye exhibits no discharge. Left eye exhibits no discharge. Scleral icterus is present.   Neck: Normal range of motion. Neck supple.   Cardiovascular: Normal rate and regular rhythm.   R midline   Pulmonary/Chest: Effort normal and breath sounds normal.   Abdominal: Soft. Bowel sounds are normal. He exhibits distension. There is no tenderness. There is no guarding.   Musculoskeletal: Normal  range of motion. He exhibits edema. He exhibits no tenderness.   Neurological: He is alert and oriented to person, place, and time.   Skin: Skin is warm and dry. No rash noted. He is not diaphoretic.   Striae on abdomen and back   Psychiatric: He has a normal mood and affect. His behavior is normal.       Significant Labs:   Blood Culture:   Recent Labs   Lab 11/25/19  2207 11/25/19  2230 11/29/19  1133 11/29/19  1143 12/02/19  0331   LABBLOO No growth after 5 days. No growth after 5 days. Gram stain teresa bottle: yeast   Results called to and read back by: Alysha Khoury LPN  11/30/2019    21:19  CANDIDA GLABRATA  Susceptibility pending  ID consult required at [facility].  * Gram stain teresa bottle: yeast   Positive results previously called 12/01/2019  00:11  RIAT GLABRATA  ID consult required at [facility] and [facility].  For susceptibility see order #[id number]  * No Growth to date  No Growth to date     CBC:   Recent Labs   Lab 12/01/19 0415 12/02/19 0331   WBC 23.73* 27.47*   HGB 7.6* 7.9*   HCT 23.5* 25.0*   PLT 71* 68*     CMP:   Recent Labs   Lab 12/01/19 0415 12/02/19 0331   * 129*   K 4.4 5.1   CL 92* 92*   CO2 19* 16*    97   BUN 99* 108*   CREATININE 7.1* 8.3*   CALCIUM 8.7 8.6*   PROT 5.4* 5.9*   ALBUMIN 3.3* 3.5   BILITOT 7.8* 8.6*   ALKPHOS 184* 212*   AST 95* 88*   ALT 26 26   ANIONGAP 17* 21*   EGFRNONAA 9.1* 7.6*     Microbiology Results (last 7 days)     Procedure Component Value Units Date/Time    Culture, Body Fluid - Bactec [[id number]]     Order Status:  No result Specimen:  Body Fluid from Peritoneal Fluid     Gram stain [[id number]]     Order Status:  No result Specimen:  Body Fluid from Peritoneal Fluid     AFB culture [[id number]]     Order Status:  No result Specimen:  Body Fluid from Peritoneal Fluid     Fungus culture [[id number]]     Order Status:  No result Specimen:  Ascites from Peritoneal Fluid     Culture, Anaerobe  [[id number]] Collected:  11/29/19 1556    Order Status:  Completed Specimen:  Body Fluid from Ascites Updated:  12/02/19 1357     Anaerobic Culture Culture in progress    Blood culture [[id number]] Collected:  12/02/19 0331    Order Status:  Completed Specimen:  Blood Updated:  12/02/19 1115     Blood Culture, Routine No Growth to date    Blood culture [[id number]] Collected:  12/02/19 0331    Order Status:  Completed Specimen:  Blood Updated:  12/02/19 1115     Blood Culture, Routine No Growth to date    Culture, Anaerobe [[id number]] Collected:  11/26/19 0744    Order Status:  Completed Specimen:  Body Fluid from Peritoneal Fluid Updated:  12/02/19 1036     Anaerobic Culture Culture in progress    Blood culture [[id number]]  (Abnormal) Collected:  11/29/19 1143    Order Status:  Completed Specimen:  Blood Updated:  12/02/19 0935     Blood Culture, Routine Gram stain teresa bottle: yeast       Positive results previously called 12/01/2019  00:11      RITA GLABRATA  [facility].  For susceptibility see order #[id number]      Narrative:       Collection has been rescheduled by T at 11/29/2019 11:02 Reason:   patient in bathroom.   Collection has been rescheduled by T at 11/29/2019 11:19 Reason:   Unable to collect  Collection has been rescheduled by T at 11/29/2019 11:02 Reason:   patient in bathroom.   Collection has been rescheduled by T at 11/29/2019 11:19 Reason:   Unable to collect    Blood culture [[id number]]  (Abnormal) Collected:  11/29/19 1133    Order Status:  Completed Specimen:  Blood Updated:  12/02/19 0934     Blood Culture, Routine Gram stain teresa bottle: yeast       Results called to and read back by: Alysha Khoury LPN  11/30/2019        21:19      RITA GLABRATA  Susceptibility pending  [facility].      Narrative:       Collection has been rescheduled by T at 11/29/2019 11:02 Reason:    patient in bathroom.   Collection has been rescheduled by VHT at 11/29/2019 11:19 Reason:   Unable to collect  Collection has been rescheduled by VHT at 11/29/2019 11:02 Reason:   patient in bathroom.   Collection has been rescheduled by VHT at 11/29/2019 11:19 Reason:   Unable to collect    Aerobic culture [[id number]] Collected:  11/29/19 1556    Order Status:  Completed Specimen:  Body Fluid from Ascites Updated:  12/02/19 0852     Aerobic Bacterial Culture No growth    Blood culture [[id number]] Collected:  11/25/19 2207    Order Status:  Completed Specimen:  Blood Updated:  11/30/19 2312     Blood Culture, Routine No growth after 5 days.    Blood culture [[id number]] Collected:  11/25/19 2230    Order Status:  Completed Specimen:  Blood Updated:  11/30/19 2312     Blood Culture, Routine No growth after 5 days.    Urine culture [[id number]] Collected:  11/29/19 1338    Order Status:  Completed Specimen:  Urine Updated:  11/30/19 1928     Urine Culture, Routine No growth    Narrative:       Preferred Collection Type->Urine, Clean Catch    Gram stain [[id number]] Collected:  11/29/19 1556    Order Status:  Completed Specimen:  Body Fluid from Ascites Updated:  11/29/19 1814     Gram Stain Result Rare WBC's      No organisms seen    Fungus culture [[id number]] Collected:  11/29/19 1556    Order Status:  Sent Specimen:  Body Fluid from Ascites Updated:  11/29/19 1717    Aerobic culture [[id number]] Collected:  11/26/19 0744    Order Status:  Completed Specimen:  Body Fluid from Peritoneal Fluid Updated:  11/29/19 1001     Aerobic Bacterial Culture No growth          Significant Imaging: I have reviewed all pertinent imaging results/findings within the past 24 hours.

## 2019-12-02 NOTE — PROGRESS NOTES
"[facility]  Hepatology  Progress Note    Patient Name: Luis Hurd  MRN: [id number]  Admission Date: 11/25/2019  Hospital Length of Stay: 7 days  Attending Provider: Mya Subramanian MD   Primary Care Physician: William Nettles MD  Principal Problem:Spontaneous bacterial peritonitis    Subjective:     Transplant status: No    HPI: Patient is a 34-year-old male with past medical history of alcohol abuse, newly diagnosed alcoholic cirrhosis with ascites, history of morbid obesity, who was admitted to outside hospital in 1 week ago for abdominal pain and lower extremity edema.  Despite initial evaluation consistent with SBP and a pancreatitis, the patient did not improve with IV ceftriaxone or pentoxifylline with worsening leukocytosis and kidney function.  He was transferred to [facility] for further evaluation.    Patient states that he drinks 2-3 bottles of wine every night after coming back from work, and has been doing that for a long time.  He stopped about a month ago as "it was not making him feel good".  Denies being to rehab, or having issues at work related to his alcohol use.  Endorses single DUI in the past.  Denies IV drug use, history of viral hepatitis, or history of liver disease. He was told that his liver enzymes were elevated in the past, but was never fully counseled to stop alcohol.  He was not on any medications prior to this admission.    On evaluation at outside hospital, the patient was hemodynamically stable on evaluation.  A paracentesis was done and was consistent with SBP, with PMNs around 1000.  Lipase was elevated at 700.  A CT scan of the abdomen with contrast on 11/21 was consistent with acute pancreatitis with possible early pseudocyst.  He also had features of cirrhosis with ascites. He was started on pentoxifylline, IV ceftriaxone.  During hospitalization his white count worsened to 27 from 10, creatinine 3.1 from normal, and worsening bilirubin. "  He was started on hepatorenal syndrome treatment, however with no improvement.  He was switched to meropenem prior to transfer.    Today the patient is feeling well, and endorses overall improvement of his abdominal pain.  Denies fever, chills, nausea, vomiting, change in bowel habits.  He is hemodynamically stable however mildly tachycardic.  Afebrile.  He underwent paracentesis, with consistent with improving SBP.  Of note ascitic fluid amylase was elevated at 2000 at outside hospital.    Interval History:   - reports he is feeling well, denies any complaints today.  No abdominal pain, nausea or vomiting. Tolerating p.o. without any issue.  - denies any visual complaints, awaiting follow-up examination from Ophthalmology after eyes were dilated.  - WBC rising slightly to 27  - INR stable 1.3, bilirubin stable 8.6  - creatinine rising to 8.3, oliguric    Current Facility-Administered Medications   Medication    acetaminophen tablet 325 mg    bisacodyl suppository 10 mg    ciprofloxacin HCl tablet 250 mg    dextrose 10% (D10W) Bolus    dextrose 10% (D10W) Bolus    folic acid tablet 1 mg    glucagon (human recombinant) injection 1 mg    glucose chewable tablet 16 g    glucose chewable tablet 24 g    micafungin 100 mg in sodium chloride 0.9 % 100 mL IVPB (ready to mix system)    midodrine tablet 15 mg    ondansetron tablet 8 mg    polyethylene glycol packet 17 g    promethazine (PHENERGAN) 12.5 mg in dextrose 5 % 50 mL IVPB    senna-docusate 8.6-50 mg per tablet 1 tablet    sevelamer carbonate tablet 1,600 mg    sodium chloride 0.9% flush 10 mL    thiamine tablet 100 mg    traMADol tablet 50 mg       Objective:     Vital Signs (Most Recent):  Temp: 97.6 °F (36.4 °C) (12/02/19 1140)  Pulse: 89 (12/02/19 1140)  Resp: 19 (12/02/19 1140)  BP: (!) 111/55 (12/02/19 1140)  SpO2: (!) 94 % (12/02/19 1140) Vital Signs (24h Range):  Temp:  [97 °F (36.1 °C)-98.5 °F (36.9 °C)] 97.6 °F (36.4 °C)  Pulse:   [] 89  Resp:  [16-19] 19  SpO2:  [92 %-100 %] 94 %  BP: (108-137)/(55-64) 111/55     Weight: 129.3 kg (285 lb) (11/29/19 1300)  Body mass index is 36.59 kg/m².    Physical Exam   Constitutional: He is oriented to person, place, and time. He appears well-nourished. No distress.   Appears comfortable, accompanied by family.  Watching television.  Alert and oriented.   HENT:   Mouth/Throat: Oropharynx is clear and moist.   Eyes: Scleral icterus is present.   Pulmonary/Chest: Effort normal. No respiratory distress.   Abdominal: Soft. He exhibits no distension. There is no tenderness.   Soft, nondistended or tender.   Musculoskeletal: He exhibits no edema or tenderness.   Neurological: He is alert and oriented to person, place, and time.   Skin: Skin is warm. Capillary refill takes less than 2 seconds. He is not diaphoretic.   Psychiatric: He has a normal mood and affect.   Nursing note and vitals reviewed.      MELD-Na score: 33 at 12/2/2019  3:31 AM  MELD score: 31 at 12/2/2019  3:31 AM  Calculated from:  Serum Creatinine: 8.3 mg/dL (Rounded to 4 mg/dL) at 12/2/2019  3:31 AM  Serum Sodium: 129 mmol/L at 12/2/2019  3:31 AM  Total Bilirubin: 8.6 mg/dL at 12/2/2019  3:31 AM  INR(ratio): 1.3 at 12/2/2019  3:31 AM  Age: 34 years    Significant Labs:  Labs within the past month have been reviewed.    Significant Imaging:  Labs: Reviewed    Assessment/Plan:     Decompensated hepatic cirrhosis  Patient is a 34-year-old male with history of alcohol abuse in new diagnosis of alcoholic cirrhosis who presented with SBP and pancreatitis, worsening despite treatment with IV antibiotics.  Repeat infx workup notable for fungemia currently on treatment and undergoing transplant evaluation. Renal failure progressively worsening since admission.    Plan  - SBP. 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 On micafungin. Negative optho examination. Negative echo.  -LORENE, likely HRS. Worsening,  may require HD this week. On HRS.  -varices screening:  No EGD in the past  -hepatocellular carcinoma screening:  CTAP and RUQ neg. AFP WNL  -transplant candidacy: inpatient evaluation ongoing. Modifiably high risk for relapse per psych evaluation            Thank you for your consult. I will follow-up with patient. Please contact us if you have any additional questions.    Keenan Apple MD  Hepatology  [facility]

## 2019-12-02 NOTE — PLAN OF CARE
Patient scheduled for para tomorrow morning. Suppository administered, without success so far. Patient has little appetite due to pressure in his abdomen from fluid. Family at bedside most of shift and involved in care. VS stable. Plan of care reviewed with patient and questions answered.

## 2019-12-02 NOTE — CONSULTS
Chief complaint/Reason for Consult:     History of Present Illness: Luis Hurd is a 34 y.o. male consulted for DFE for fungemia. PT denies the following: new floaters, flashes of light in vision, dark curtains covering vision, any change in status in visual acuity or peripheral vision, ocular pain, diplopia, eye redness, visual distortions (straight lines appearing wavey etc.),photophobia, discharge from eyes, swelling of eyelids.     POcularHx: no past ocular surgeries, does not use glasses or contacts     Current eye gtts: none      PMHx:  has a past medical history of Acute pancreatitis (11/26/2019), Alcoholic cirrhosis of liver with ascites (11/26/2019), Severe alcohol dependence (11/26/2019), and Spontaneous bacterial peritonitis (11/25/2019).     PSurgHx:  has no past surgical history on file.     Home Medications:   Prior to Admission medications    Medication Sig Start Date End Date Taking? Authorizing Provider   furosemide (LASIX) 40 MG tablet Take 40 mg by mouth once daily.    Historical Provider, MD   multivitamin (THERAGRAN) per tablet Take 1 tablet by mouth once daily.    Historical Provider, MD   spironolactone (ALDACTONE) 25 MG tablet Take 25 mg by mouth once daily.    Historical Provider, MD        Medications this encounter:    ciprofloxacin HCl  250 mg Oral Daily    folic acid  1 mg Oral Daily    micafungin (MYCAMINE) IVPB  100 mg Intravenous Q24H    midodrine  15 mg Oral TID    polyethylene glycol  17 g Oral Daily    senna-docusate 8.6-50 mg  1 tablet Oral Daily    sevelamer carbonate  1,600 mg Oral TID WM    thiamine  100 mg Oral Daily       Allergies: has No Known Allergies.     Social:  reports that he has never smoked. He has never used smokeless tobacco.     Family Hx: No family history of glaucoma. family history is not on file.     ROS: Negative x 10 except for complaints as described in HPI; negative for fever, chills, weight loss, nausea, vomiting, diarrhea, shortness of  breath, nasal discharge, cough, abdominal pain, dyspnea, difficulty moving arms and legs, confusion, dysuria, palpitations, or chest pain     Ocular examination/Dilated fundus examination:  Base Eye Exam     Visual Acuity       Right Left    Near sc 20/20 20/20          Pupils       Pupils Dark Light Shape React APD    Right PERRL 4 2 Round y None    Left PERRL 4 2 Round y None          Visual Fields       Right Left     Full           Extraocular Movement       Right Left     Full Full            Slit Lamp and Fundus Exam     External Exam       Right Left    External Normal Normal          Pen Light Exam       Right Left    Lids/Lashes Normal Normal    Conjunctiva/Sclera White and quiet White and quiet    Cornea Clear Clear    Anterior Chamber grossly normal grossly normal    Iris Round and reactive Round and reactive          Fundus Exam       Right Left    Disc Normal Normal    C/D Ratio .4 .4    Macula flat, good flr flat, good flr    Vessels Normal Normal    Periphery Normal Normal                Assessment/Plan:   1. Normal Eye exam   - No signs of fungal endophthalmitis     Please re consult if new concerns arise    Pedro Marin MD  Ophthalmology Resident, PGY-2  \[facility]\""-[facility] [place]

## 2019-12-02 NOTE — SUBJECTIVE & OBJECTIVE
Past Medical History:   Diagnosis Date    Acute pancreatitis 11/26/2019    Alcoholic cirrhosis of liver with ascites 11/26/2019    Severe alcohol dependence 11/26/2019    Spontaneous bacterial peritonitis 11/25/2019       History reviewed. No pertinent surgical history.    Review of patient's allergies indicates:  No Known Allergies    Medications:  Medications Prior to Admission   Medication Sig    furosemide (LASIX) 40 MG tablet Take 40 mg by mouth once daily.    multivitamin (THERAGRAN) per tablet Take 1 tablet by mouth once daily.    spironolactone (ALDACTONE) 25 MG tablet Take 25 mg by mouth once daily.     Antibiotics (From admission, onward)    Start     Stop Route Frequency Ordered    12/01/19 0900  ciprofloxacin HCl tablet 250 mg      -- Oral Daily 11/30/19 1055        Antifungals (From admission, onward)    Start     Stop Route Frequency Ordered    12/01/19 1015  micafungin 100 mg in sodium chloride 0.9 % 100 mL IVPB (ready to mix system)      -- IV Every 24 hours (non-standard times) 12/01/19 0915        Antivirals (From admission, onward)    None             There is no immunization history on file for this patient.    Family History     None        Social History     Socioeconomic History    Marital status: Single     Spouse name: Not on file    Number of children: Not on file    Years of education: Not on file    Highest education level: Not on file   Occupational History    Not on file   Social Needs    Financial resource strain: Not on file    Food insecurity:     Worry: Not on file     Inability: Not on file    Transportation needs:     Medical: Not on file     Non-medical: Not on file   Tobacco Use    Smoking status: Never Smoker    Smokeless tobacco: Never Used   Substance and Sexual Activity    Alcohol use: Not on file    Drug use: Not on file    Sexual activity: Not on file   Lifestyle    Physical activity:     Days per week: Not on file     Minutes per session: Not on file     Stress: Not on file   Relationships    Social connections:     Talks on phone: Not on file     Gets together: Not on file     Attends Rastafari service: Not on file     Active member of club or organization: Not on file     Attends meetings of clubs or organizations: Not on file     Relationship status: Not on file   Other Topics Concern    Not on file   Social History Narrative    Not on file     Review of Systems   Constitutional: Positive for activity change and fatigue. Negative for appetite change and fever.   HENT: Negative for trouble swallowing.    Eyes: Negative for photophobia.   Respiratory: Negative for cough and shortness of breath.    Cardiovascular: Positive for leg swelling. Negative for chest pain.   Gastrointestinal: Positive for abdominal distention and abdominal pain. Negative for blood in stool, constipation, diarrhea, nausea and vomiting.   Genitourinary: Negative for flank pain.   Musculoskeletal: Negative for arthralgias and back pain.   Skin: Positive for color change.   Allergic/Immunologic: Positive for immunocompromised state.   Psychiatric/Behavioral: Negative for agitation and confusion.     Objective:     Vital Signs (Most Recent):  Temp: 98.2 °F (36.8 °C) (12/01/19 1926)  Pulse: 106 (12/01/19 1929)  Resp: 17 (12/01/19 1926)  BP: (!) 118/59 (12/01/19 1926)  SpO2: 95 % (12/01/19 1926) Vital Signs (24h Range):  Temp:  [97.4 °F (36.3 °C)-98.4 °F (36.9 °C)] 98.2 °F (36.8 °C)  Pulse:  [] 106  Resp:  [16-19] 17  SpO2:  [94 %-95 %] 95 %  BP: (108-125)/(56-59) 118/59     Weight: 129.3 kg (285 lb)  Body mass index is 36.59 kg/m².    Estimated Creatinine Clearance: 20.9 mL/min (A) (based on SCr of 7.1 mg/dL (H)).    Physical Exam   Constitutional: He is oriented to person, place, and time. No distress.   HENT:   Head: Normocephalic and atraumatic.   Mouth/Throat: No oropharyngeal exudate.   Eyes: Right eye exhibits no discharge. Left eye exhibits no discharge. Scleral icterus is  present.   Neck: Normal range of motion. Neck supple.   Cardiovascular: Normal rate and regular rhythm.   Pulmonary/Chest: Effort normal and breath sounds normal.   Abdominal: Soft. Bowel sounds are normal. He exhibits distension. There is no tenderness. There is no guarding.   Musculoskeletal: Normal range of motion. He exhibits edema. He exhibits no tenderness.   Neurological: He is alert and oriented to person, place, and time.   Skin: Skin is warm and dry. No rash noted. He is not diaphoretic.   Striae on abdomen and back   Psychiatric: He has a normal mood and affect. His behavior is normal.       Significant Labs:   Blood Culture:   Recent Labs   Lab 11/25/19  2207 11/25/19  2230 11/29/19  1133 11/29/19  1143   LABBLOO No growth after 5 days. No growth after 5 days. Gram stain teresa bottle: yeast   Results called to and read back by: Alysha Khoury LPN  11/30/2019    21:19  CANDIDA GLABRATA  Susceptibility pending  ID consult required at [facility] and [facility] locations.  * Gram stain teresa bottle: yeast   Positive results previously called 12/01/2019  00:11  RITA GLABRATA  ID consult required at [facility] and [facility] locations.  For susceptibility see order #[id number]  *     CBC:   Recent Labs   Lab 11/30/19  0553 12/01/19  0415   WBC 25.78* 23.73*   HGB 7.9* 7.6*   HCT 24.5* 23.5*   PLT 79* 71*     CMP:   Recent Labs   Lab 11/30/19  0553 12/01/19  0415   * 128*   K 4.3 4.4   CL 92* 92*   CO2 23 19*   * 105   BUN 91* 99*   CREATININE 6.0* 7.1*   CALCIUM 8.6* 8.7   PROT 5.3* 5.4*   ALBUMIN 3.0* 3.3*   BILITOT 9.1* 7.8*   ALKPHOS 194* 184*   * 95*   ALT 32 26   ANIONGAP 13 17*   EGFRNONAA 11.2* 9.1*     Microbiology Results (last 7 days)     Procedure Component Value Units Date/Time    Blood culture [[id number]]  (Abnormal) Collected:  11/29/19 1143    Order Status:  Completed Specimen:  Blood Updated:  12/01/19 1206     Blood Culture, Routine Gram stain teresa bottle:  yeast       Positive results previously called 12/01/2019  00:11      RITA GLABRATA  ID consult required at [facility] and [facility].  For susceptibility see order #[id number]      Narrative:       Collection has been rescheduled by VHT at 11/29/2019 11:02 Reason:   patient in bathroom.   Collection has been rescheduled by VHT at 11/29/2019 11:19 Reason:   Unable to collect  Collection has been rescheduled by VHT at 11/29/2019 11:02 Reason:   patient in bathroom.   Collection has been rescheduled by VHT at 11/29/2019 11:19 Reason:   Unable to collect    Blood culture [[id number]]  (Abnormal) Collected:  11/29/19 1133    Order Status:  Completed Specimen:  Blood Updated:  12/01/19 1205     Blood Culture, Routine Gram stain teresa bottle: yeast       Results called to and read back by: Alysha Khoury LPN  11/30/2019        21:19      RITA GLABRATA  Susceptibility pending  ID consult required at [facility] and [facility].      Narrative:       Collection has been rescheduled by T at 11/29/2019 11:02 Reason:   patient in bathroom.   Collection has been rescheduled by T at 11/29/2019 11:19 Reason:   Unable to collect  Collection has been rescheduled by T at 11/29/2019 11:02 Reason:   patient in bathroom.   Collection has been rescheduled by T at 11/29/2019 11:19 Reason:   Unable to collect    Blood culture [[id number]] Collected:  11/25/19 2207    Order Status:  Completed Specimen:  Blood Updated:  11/30/19 2312     Blood Culture, Routine No growth after 5 days.    Blood culture [[id number]] Collected:  11/25/19 2230    Order Status:  Completed Specimen:  Blood Updated:  11/30/19 2312     Blood Culture, Routine No growth after 5 days.    Urine culture [[id number]] Collected:  11/29/19 1338    Order Status:  Completed Specimen:  Urine Updated:  11/30/19 1928     Urine Culture, Routine No growth    Narrative:       Preferred Collection Type->Urine, Clean Catch    Culture, Anaerobe  [[id number]] Collected:  11/29/19 1556    Order Status:  Completed Specimen:  Body Fluid from Ascites Updated:  11/30/19 1409     Anaerobic Culture Culture in progress    Aerobic culture [[id number]] Collected:  11/29/19 1556    Order Status:  Completed Specimen:  Body Fluid from Ascites Updated:  11/30/19 0805     Aerobic Bacterial Culture No growth    Gram stain [[id number]] Collected:  11/29/19 1556    Order Status:  Completed Specimen:  Body Fluid from Ascites Updated:  11/29/19 1814     Gram Stain Result Rare WBC's      No organisms seen    Fungus culture [[id number]] Collected:  11/29/19 1556    Order Status:  Sent Specimen:  Body Fluid from Ascites Updated:  11/29/19 1717    Culture, Anaerobe [[id number]] Collected:  11/26/19 0744    Order Status:  Completed Specimen:  Body Fluid from Peritoneal Fluid Updated:  11/29/19 1030     Anaerobic Culture Culture in progress    Aerobic culture [[id number]] Collected:  11/26/19 0744    Order Status:  Completed Specimen:  Body Fluid from Peritoneal Fluid Updated:  11/29/19 1001     Aerobic Bacterial Culture No growth          Significant Imaging: I have reviewed all pertinent imaging results/findings within the past 24 hours.

## 2019-12-02 NOTE — ASSESSMENT & PLAN NOTE
Patient is a 34-year-old male with history of alcohol abuse in new diagnosis of alcoholic cirrhosis who presented with SBP and pancreatitis, worsening despite treatment with IV antibiotics.  Repeat infx workup notable for fungemia currently on treatment and undergoing transplant evaluation. Renal failure progressively worsening since admission.    Plan  - SBP. 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 On micafungin. Negative optho examination. Negative echo.  -LORENE, likely HRS. Worsening, may require HD this week. On HRS.  -varices screening:  No EGD in the past  -hepatocellular carcinoma screening:  CTAP and RUQ neg. AFP WNL  -transplant candidacy: inpatient evaluation ongoing. Modifiably high risk for relapse per psych evaluation

## 2019-12-02 NOTE — PROGRESS NOTES
Called patient's mother and father to see if they were still present in patient's room and/or if they would be available this evening.  They had just left facility.  Mrs. Hurd stated she would not return until Wednesday after 9:30.  Agreed we could arrange teaching on that day.  She verbalized concern about his interfering with patient being presented to committee.  Reassured this would not.

## 2019-12-02 NOTE — CONSULTS
[facility]  Infectious Disease  Consult Note    Patient Name: Luis Hurd  MRN: [id number]  Admission Date: 11/25/2019  Hospital Length of Stay: 6 days  Attending Physician: Mya Subramanian MD  Primary Care Provider: William Nettles MD     Isolation Status: No active isolations    Patient information was obtained from patient, parent, relative(s) and ER records.      Inpatient consult to Infectious Diseases  Consult performed by: Luis Alberto Gonzáles MD  Consult ordered by: Mya Subramanian MD        Assessment/Plan:     Infection due to Candida glabrata  33 y/o M PMhx alcoholic cirrhosis c/b ascites on diuretics, morbid obesity who presented to OSH 11/20 w/ worsening abdominal pain and distension with LE edema s/p transfer to [facility] [place] liver txp evaluation and LORENE. Patient was empirically started on ceftriaxone for SBP. Afterwards underwent paracentesis 11/21 w/ removal 6.2L (820 segs (815 corrected)) & given albumin. CT abd/pelvis w/ IV cont 11/21 revealed mild diffuse fluid or stranding about pancreas & lipase 700. Consequently diagnosed w/ pancreatitis. He subsequently developed LORENE and he was started on treatment for presumed HRS. Given worsening leukocytosis (16.5->27) ID was consulted and antibiotics broadened to meropenem. Patient transferred to [facility] for liver transplant evaluation. Additionally patient now w/ fungemia (candida glabrata). ID is consulted for antibiotic recommendations. He received >7 days antibiotics w/ paracentesis 11/29 showing resolution of peritonitis (126 segs from 815). TTE unremarkable. Fungemia likely 2/2 GI translocation.    Recommendations  C/w micafungin  F/u repeat blood cultures  Consult ophthalmology to r/o endophthalmitis  C/w SBP prophylaxis    Transplant work-up  Hepatitis B core, surface Ag, surface Ab negative  Hepatitis A IgG positive  Hepatitis C Ab negative  Varicella Zoster IgG positive  Strongyloides Ab IgG negative  CMV IgG positive  HIV  negative  RPR non-reactive  States has received vaccinations through grade school    Needs  Hepatitis B vaccination  F/u Tspot  Prevnar followed by Pneumovax 8 weeks later  Shingrix  Tdap        Thank you for your consult. I will follow-up with patient. Please contact us if you have any additional questions.    Luis Alberto Gonzáles MD  Infectious Disease  [facility]    Subjective:     Principal Problem: Spontaneous bacterial peritonitis    HPI: 35 y/o M PMhx alcoholic cirrhosis c/b ascites on diuretics, morbid obesity who presented to OSH 11/20 w/ worsening abdominal pain and distension with LE edema s/p [place] [facility] [place] and LORENE. Patient was empirically started on ceftriaxone for SBP. Afterwards underwent paracentesis 11/21 w/ removal 6.2L (820 segs (816 corrected)) & given albumin. CT abd/pelvis w/ IV cont 11/21 revealed mild diffuse fluid or stranding about pancreas & lipase 700. Consequently diagnosed w/ pancreatitis. He subsequently developed LORENE and he was started on treatment for presumed HRS. Given worsening leukocytosis (16.5->27) ID was consulted and antibiotics broadened to meropenem. Patient transferred [place] [facility] [place]. Additionally patient now w/ fungemia (candida glabrata). ID is consulted for antibiotic recommendations.    Of note last drink ~1 month ago (started drinking ~15 yrs ago, with 2-3 wine bottles/day).    Past Medical History:   Diagnosis Date    Acute pancreatitis 11/26/2019    Alcoholic cirrhosis of liver with ascites 11/26/2019    Severe alcohol dependence 11/26/2019    Spontaneous bacterial peritonitis 11/25/2019       History reviewed. No pertinent surgical history.    Review of patient's allergies indicates:  No Known Allergies    Medications:  Medications Prior to Admission   Medication Sig    furosemide (LASIX) 40 MG tablet Take 40 mg by mouth once daily.    multivitamin (THERAGRAN) per tablet Take 1 tablet by  mouth once daily.    spironolactone (ALDACTONE) 25 MG tablet Take 25 mg by mouth once daily.     Antibiotics (From admission, onward)    Start     Stop Route Frequency Ordered    12/01/19 0900  ciprofloxacin HCl tablet 250 mg      -- Oral Daily 11/30/19 1055        Antifungals (From admission, onward)    Start     Stop Route Frequency Ordered    12/01/19 1015  micafungin 100 mg in sodium chloride 0.9 % 100 mL IVPB (ready to mix system)      -- IV Every 24 hours (non-standard times) 12/01/19 0915        Antivirals (From admission, onward)    None             There is no immunization history on file for this patient.    Family History     None        Social History     Socioeconomic History    Marital status: Single     Spouse name: Not on file    Number of children: Not on file    Years of education: Not on file    Highest education level: Not on file   Occupational History    Not on file   Social Needs    Financial resource strain: Not on file    Food insecurity:     Worry: Not on file     Inability: Not on file    Transportation needs:     Medical: Not on file     Non-medical: Not on file   Tobacco Use    Smoking status: Never Smoker    Smokeless tobacco: Never Used   Substance and Sexual Activity    Alcohol use: Not on file    Drug use: Not on file    Sexual activity: Not on file   Lifestyle    Physical activity:     Days per week: Not on file     Minutes per session: Not on file    Stress: Not on file   Relationships    Social connections:     Talks on phone: Not on file     Gets together: Not on file     Attends Mu-ism service: Not on file     Active member of club or organization: Not on file     Attends meetings of clubs or organizations: Not on file     Relationship status: Not on file   Other Topics Concern    Not on file   Social History Narrative    Not on file     Review of Systems   Constitutional: Positive for activity change and fatigue. Negative for appetite change and fever.    HENT: Negative for trouble swallowing.    Eyes: Negative for photophobia.   Respiratory: Negative for cough and shortness of breath.    Cardiovascular: Positive for leg swelling. Negative for chest pain.   Gastrointestinal: Positive for abdominal distention and abdominal pain. Negative for blood in stool, constipation, diarrhea, nausea and vomiting.   Genitourinary: Negative for flank pain.   Musculoskeletal: Negative for arthralgias and back pain.   Skin: Positive for color change.   Allergic/Immunologic: Positive for immunocompromised state.   Psychiatric/Behavioral: Negative for agitation and confusion.     Objective:     Vital Signs (Most Recent):  Temp: 98.2 °F (36.8 °C) (12/01/19 1926)  Pulse: 106 (12/01/19 1929)  Resp: 17 (12/01/19 1926)  BP: (!) 118/59 (12/01/19 1926)  SpO2: 95 % (12/01/19 1926) Vital Signs (24h Range):  Temp:  [97.4 °F (36.3 °C)-98.4 °F (36.9 °C)] 98.2 °F (36.8 °C)  Pulse:  [] 106  Resp:  [16-19] 17  SpO2:  [94 %-95 %] 95 %  BP: (108-125)/(56-59) 118/59     Weight: 129.3 kg (285 lb)  Body mass index is 36.59 kg/m².    Estimated Creatinine Clearance: 20.9 mL/min (A) (based on SCr of 7.1 mg/dL (H)).    Physical Exam   Constitutional: He is oriented to person, place, and time. No distress.   HENT:   Head: Normocephalic and atraumatic.   Mouth/Throat: No oropharyngeal exudate.   Eyes: Right eye exhibits no discharge. Left eye exhibits no discharge. Scleral icterus is present.   Neck: Normal range of motion. Neck supple.   Cardiovascular: Normal rate and regular rhythm.   Pulmonary/Chest: Effort normal and breath sounds normal.   Abdominal: Soft. Bowel sounds are normal. He exhibits distension. There is no tenderness. There is no guarding.   Musculoskeletal: Normal range of motion. He exhibits edema. He exhibits no tenderness.   Neurological: He is alert and oriented to person, place, and time.   Skin: Skin is warm and dry. No rash noted. He is not diaphoretic.   Striae on abdomen and  back   Psychiatric: He has a normal mood and affect. His behavior is normal.       Significant Labs:   Blood Culture:   Recent Labs   Lab 11/25/19  2207 11/25/19  2230 11/29/19  1133 11/29/19  1143   LABBLOO No growth after 5 days. No growth after 5 days. Gram stain teresa bottle: yeast   Results called to and read back by: Alysha Khoury LPN  11/30/2019    21:19  CANDIDA GLABRATA  Susceptibility pending  ID consult required at [facility].  * Gram stain teresa bottle: yeast   Positive results previously called 12/01/2019  00:11  RITA GLABRATA  [facility] consult [facility] at [facility].  For susceptibility see order #[id number]  *     CBC:   Recent Labs   Lab 11/30/19  0553 12/01/19  0415   WBC 25.78* 23.73*   HGB 7.9* 7.6*   HCT 24.5* 23.5*   PLT 79* 71*     CMP:   Recent Labs   Lab 11/30/19  0553 12/01/19  0415   * 128*   K 4.3 4.4   CL 92* 92*   CO2 23 19*   * 105   BUN 91* 99*   CREATININE 6.0* 7.1*   CALCIUM 8.6* 8.7   PROT 5.3* 5.4*   ALBUMIN 3.0* 3.3*   BILITOT 9.1* 7.8*   ALKPHOS 194* 184*   * 95*   ALT 32 26   ANIONGAP 13 17*   EGFRNONAA 11.2* 9.1*     Microbiology Results (last 7 days)     Procedure Component Value Units Date/Time    Blood culture [[id number]]  (Abnormal) Collected:  11/29/19 1143    Order Status:  Completed Specimen:  Blood Updated:  12/01/19 1206     Blood Culture, Routine Gram stain teresa bottle: yeast       Positive results previously called 12/01/2019  00:11      RITA GLABRATA  ID consult required at [facility].  For susceptibility see order #[id number]      Narrative:       Collection has been rescheduled by VHT at 11/29/2019 11:02 Reason:   patient in bathroom.   Collection has been rescheduled by VHT at 11/29/2019 11:19 Reason:   Unable to collect  Collection has been rescheduled by VHT at 11/29/2019 11:02 Reason:   patient in bathroom.   Collection has been rescheduled by T  at 11/29/2019 11:19 Reason:   Unable to collect    Blood culture [[id number]]  (Abnormal) Collected:  11/29/19 1133    Order Status:  Completed Specimen:  Blood Updated:  12/01/19 1205     Blood Culture, Routine Gram stain teresa bottle: yeast       Results called to and read back by: Alysha Khoury LPN  11/30/2019        21:19      RITA GLABRATA  Susceptibility pending  ID consult required at [facility] [facility].      Narrative:       Collection has been rescheduled by VHT at 11/29/2019 11:02 Reason:   patient in bathroom.   Collection has been rescheduled by VHT at 11/29/2019 11:19 Reason:   Unable to collect  Collection has been rescheduled by VHT at 11/29/2019 11:02 Reason:   patient in bathroom.   Collection has been rescheduled by VHT at 11/29/2019 11:19 Reason:   Unable to collect    Blood culture [[id number]] Collected:  11/25/19 2207    Order Status:  Completed Specimen:  Blood Updated:  11/30/19 2312     Blood Culture, Routine No growth after 5 days.    Blood culture [[id number]] Collected:  11/25/19 2230    Order Status:  Completed Specimen:  Blood Updated:  11/30/19 2312     Blood Culture, Routine No growth after 5 days.    Urine culture [[id number]] Collected:  11/29/19 1338    Order Status:  Completed Specimen:  Urine Updated:  11/30/19 1928     Urine Culture, Routine No growth    Narrative:       Preferred Collection Type->Urine, Clean Catch    Culture, Anaerobe [[id number]] Collected:  11/29/19 1556    Order Status:  Completed Specimen:  Body Fluid from Ascites Updated:  11/30/19 1409     Anaerobic Culture Culture in progress    Aerobic culture [[id number]] Collected:  11/29/19 1556    Order Status:  Completed Specimen:  Body Fluid from Ascites Updated:  11/30/19 0805     Aerobic Bacterial Culture No growth    Gram stain [[id number]] Collected:  11/29/19 1556    Order Status:  Completed Specimen:  Body Fluid from Ascites Updated:  11/29/19 1814     Gram Stain Result Rare WBC's      No  organisms seen    Fungus culture [[id number]] Collected:  11/29/19 1556    Order Status:  Sent Specimen:  Body Fluid from Ascites Updated:  11/29/19 1717    Culture, Anaerobe [[id number]] Collected:  11/26/19 0744    Order Status:  Completed Specimen:  Body Fluid from Peritoneal Fluid Updated:  11/29/19 1030     Anaerobic Culture Culture in progress    Aerobic culture [[id number]] Collected:  11/26/19 0744    Order Status:  Completed Specimen:  Body Fluid from Peritoneal Fluid Updated:  11/29/19 1001     Aerobic Bacterial Culture No growth          Significant Imaging: I have reviewed all pertinent imaging results/findings within the past 24 hours.

## 2019-12-03 NOTE — H&P
Vascular and Interventional Radiology History & Physical    Date:  12/3/2019    Chief Complaint:   Abdominal distention    History of Present Illness:  Luis Hurd is a 34 y.o. male who presents for ultrasound guided paracentesis.     Past Medical History:  Past Medical History:   Diagnosis Date    Acute pancreatitis 11/26/2019    Alcoholic cirrhosis of liver with ascites 11/26/2019    Severe alcohol dependence 11/26/2019    Spontaneous bacterial peritonitis 11/25/2019       Past Surgical History:  History reviewed. No pertinent surgical history.     Sedation History:    Denies any adverse reactions.     Social History:  Social History     Tobacco Use    Smoking status: Never Smoker    Smokeless tobacco: Never Used   Substance Use Topics    Alcohol use: Not on file    Drug use: Not on file        Home Medications:   Prior to Admission medications    Medication Sig Start Date End Date Taking? Authorizing Provider   furosemide (LASIX) 40 MG tablet Take 40 mg by mouth once daily.    Historical Provider, MD   multivitamin (THERAGRAN) per tablet Take 1 tablet by mouth once daily.    Historical Provider, MD   spironolactone (ALDACTONE) 25 MG tablet Take 25 mg by mouth once daily.    Historical Provider, MD       Inpatient Medications:    Current Facility-Administered Medications:     acetaminophen tablet 325 mg, 325 mg, Oral, Q4H PRN, Mya Subramanian MD    bisacodyl suppository 10 mg, 10 mg, Rectal, Daily PRN, Mya Subramanian MD    ciprofloxacin HCl tablet 250 mg, 250 mg, Oral, Daily, Mya Subramanian MD, 250 mg at 12/03/19 0830    dextrose 10% (D10W) Bolus, 12.5 g, Intravenous, PRN, Ender Cervantes MD    dextrose 10% (D10W) Bolus, 25 g, Intravenous, PRN, Ender Cervantes MD    folic acid tablet 1 mg, 1 mg, Oral, Daily, Ender Cervantes MD, 1 mg at 12/03/19 0831    glucagon (human recombinant) injection 1 mg, 1 mg, Intramuscular, PRN, Ender Cervantes MD    glucose chewable tablet 16 g, 16 g, Oral,  PRN, Ender Cervantes MD    glucose chewable tablet 24 g, 24 g, Oral, PRN, Ender Cervantes MD    glycerin 99.5% topical solution 100 mL, 100 mL, Rectal, Once PRN **AND** magnesium citrate solution 296 mL, 296 mL, Rectal, Once PRN **AND** sodium chloride 0.9% bolus 500 mL, 500 mL, Rectal, Once PRN, Mya Subramanian MD    micafungin 100 mg in sodium chloride 0.9 % 100 mL IVPB (ready to mix system), 100 mg, Intravenous, Q24H, Mya Subramanian MD, Last Rate: 100 mL/hr at 12/02/19 1101, 100 mg at 12/02/19 1101    midodrine tablet 15 mg, 15 mg, Oral, TID, Mya Subramanian MD, 15 mg at 12/03/19 0830    ondansetron tablet 8 mg, 8 mg, Oral, Q6H PRN, Ender Cervantes MD, 8 mg at 11/29/19 2200    polyethylene glycol packet 17 g, 17 g, Oral, Daily, Mya Subramanian MD, Stopped at 12/03/19 0900    promethazine (PHENERGAN) 12.5 mg in dextrose 5 % 50 mL IVPB, 12.5 mg, Intravenous, Q6H PRN, Ender Cervantes MD    senna-docusate 8.6-50 mg per tablet 1 tablet, 1 tablet, Oral, BID, Ender Cervantes MD, Stopped at 12/03/19 0900    sevelamer carbonate tablet 1,600 mg, 1,600 mg, Oral, TID WM, May Subramanian MD, 1,600 mg at 12/03/19 0829    sodium chloride 0.9% flush 10 mL, 10 mL, Intravenous, PRN, Enedr Cervantes MD    thiamine tablet 100 mg, 100 mg, Oral, Daily, Ender Cervantes MD, 100 mg at 12/03/19 0830    traMADol tablet 50 mg, 50 mg, Oral, Q6H PRN, Ender Cervantes MD, 50 mg at 12/03/19 0830     Anticoagulants/Antiplatelets:   no anticoagulation    Allergies:   Review of patient's allergies indicates:  No Known Allergies    Review of Systems:   As documented in primary provider H&P.    Vital Signs (Most Recent):  Temp: 97.4 °F (36.3 °C) (12/03/19 0744)  Pulse: 83 (12/03/19 0751)  Resp: 18 (12/03/19 0744)  BP: 110/62 (12/03/19 0744)  SpO2: 95 % (12/03/19 0744)    Physical Exam:  No acute distress, laying comfortably in bed, pleasant and cooperative  Regular rate and rhythm  Breathing unlabored  Abdomen benign  Extremities  warm and well perfused    Sedation Exam:  ASA: III - Patient appears to have severe systemic disease not posing a constant threat to life   Mallampati: II (hard and soft palate, upper portion of tonsils anduvula visible)     Laboratory  Lab Results   Component Value Date    INR 1.4 (H) 12/03/2019       Lab Results   Component Value Date    WBC 26.73 (H) 12/03/2019    HGB 8.0 (L) 12/03/2019    HCT 24.8 (L) 12/03/2019     (H) 12/03/2019    PLT 68 (L) 12/03/2019      Lab Results   Component Value Date     (H) 12/03/2019     (L) 12/03/2019    K 5.5 (H) 12/03/2019    CL 91 (L) 12/03/2019    CO2 18 (L) 12/03/2019     (H) 12/03/2019    CREATININE 9.7 (H) 12/03/2019    CALCIUM 8.5 (L) 12/03/2019    MG 3.4 (H) 12/03/2019    ALT 26 12/03/2019    AST 94 (H) 12/03/2019    ALBUMIN 3.2 (L) 12/03/2019    BILITOT 9.9 (H) 12/03/2019       ASSESSMENT/PLAN:                       Sedation Plan: Local  Patient will undergo: Ultrasound guided paracentesis.    Ezequiel Hunter MD  Radiology R1

## 2019-12-03 NOTE — PLAN OF CARE
Paracentesis complete 6900mL removed from right abdomen.  Pt tolerated well, Mepore/bandaid applied. Pt in no acute distress, denies pain and discomfort. Discharge care and instructions given and acknolwedged. Transport requested to return pt to room

## 2019-12-03 NOTE — CONSULTS
Consult Note  Liver Transplant Surgery    Consult Requested By: ERYN Giang  Reason for Consult: Liver transplant evaluation    SUBJECTIVE:     History of Present Illness: Patient is a 34 y.o. male with liver disease due to alcoholic liver disease.  Symptoms and complications of liver disease include ascites (recurrent paracentesis needed), edema, encephalopathy, esophageal or gastric varices, fatigue, hepatorenal syndrome, hyponatremia, jaundice, muscle wasting and portal hypertension.     Scheduled Meds:   ciprofloxacin HCl  250 mg Oral Daily    folic acid  1 mg Oral Daily    micafungin (MYCAMINE) IVPB  100 mg Intravenous Q24H    midodrine  15 mg Oral TID    polyethylene glycol  17 g Oral Daily    senna-docusate 8.6-50 mg  1 tablet Oral BID    sevelamer carbonate  1,600 mg Oral TID WM    thiamine  100 mg Oral Daily     Continuous Infusions:  PRN Meds:acetaminophen, bisacodyl, Dextrose 10% Bolus, Dextrose 10% Bolus, glucagon (human recombinant), glucose, glucose, glycerin 99.5% **AND** magnesium citrate **AND** sodium chloride 0.9%, ondansetron, promethazine (PHENERGAN) IVPB, sodium chloride 0.9%, traMADol    Review of patient's allergies indicates:  No Known Allergies    Past Medical History:   Diagnosis Date    Acute pancreatitis 11/26/2019    Alcoholic cirrhosis of liver with ascites 11/26/2019    Severe alcohol dependence 11/26/2019    Spontaneous bacterial peritonitis 11/25/2019     History reviewed. No pertinent surgical history.  History reviewed. No pertinent family history.  Social History     Tobacco Use    Smoking status: Never Smoker    Smokeless tobacco: Never Used   Substance Use Topics    Alcohol use: Not on file    Drug use: Not on file          OBJECTIVE:     Vital Signs (Most Recent)  Temp: 97.4 °F (36.3 °C) (12/03/19 0744)  Pulse: 82 (12/03/19 1031)  Resp: 18 (12/03/19 1031)  BP: 133/62 (12/03/19 1031)  SpO2: 100 % (12/03/19 1031)    Vital Signs Range (Last 24H):  Temp:  [96.6  °F (35.9 °C)-98.1 °F (36.7 °C)]   Pulse:  [82-91]   Resp:  [16-19]   BP: (106-139)/(54-75)   SpO2:  [94 %-100 %]     Physical Exam:  Alert, mild disoriented, morbid obesity.  No previous surgeries. Mild ascites.     Laboratory:  Labs within the past month have been reviewed.      ASSESSMENT/PLAN:     Patient Active Problem List   Diagnosis    Spontaneous bacterial peritonitis    Hepatorenal syndrome    Decompensated hepatic cirrhosis    Alcoholic cirrhosis of liver with ascites    Coagulopathy    Severe sepsis with acute organ dysfunction    Anemia of chronic disease    Hyponatremia    Alcoholic hepatitis with ascites    Severe alcohol dependence    Acute pancreatitis    Acute renal failure with tubular necrosis    Macrocytic anemia    Severe protein-calorie malnutrition    Encounter for pre-transplant evaluation for liver transplant    Acute liver failure without hepatic coma    Infection due to Candida glabrata    Candidemia       Transplant Candidacy: Patient is a 34 y.o. year old male with alcoholic liver disease being evaluated for possible OLT.  In my opinion, he is a suitable liver transplant candidate.  He will be presented to selection committee after all tests and evaluations are complete.    UNOS Patient Status  Functional Status: 20% - Very sick, hospitalization necessary: active treatment necessary  Physical Capacity: No Limitations    Counseling: I discussed with the patient the benefits of liver transplantation.  We discussed the evaluation and listing procedures.  We discussed the MELD system and the associated waiting times.  We discussed national and center specific survival results.  We discussed the option of being multiply listed in different OPOs.   We discussed the risks, benefits and potential complications related to surgery including the risks related to anesthesia, bleeding, infection, primary non-function of the allograft, the risk of reoperation as well as the risk of  death.  We discussed the typical post-operative course, length of hospitalization, the long-term use of immunosuppressive therapy as well as the need for long-term routine follow-up.    PHS: I discussed the use of organs from donors with PHS increased-risk behavior, including the testing protocols utilized, as well as data from the literature regarding the likelihood of transmission of hepatitis or HIV.  The patient is willing to consider such grafts.  DCD: I discussed the use of organs recovered by donation after cardiac death (DCD), including slightly decreased graft survival and greater risk of arterial and biliary complications. The potential advantage to the recipient is possibly receiving a transplant sooner by accepting such an organ. The patient is willing to consider such grafts.  HBcAb: I discussed the use of organs from donors with HBcAb in conjunction with long term use of HBV antiviral drugs, such as lamivudine. The small but measurable risk of hepatitis B seroconversion was discussed as well as the potentially life long need to continue antiviral drugs. The patient is willing to consider such grafts.  HCV Non-viremic recipient: I discussed the use of HCV-positive organs in naive recipients, including the risk of viral transmission to the patients or others, potential insurance barriers for antiviral medication coverage, risk for fibrosing cholestatic hepatitis, death or graft loss. The potential advantage to the recipient is the possibility of receiving a transplant sooner with decreased mortality risk by accepting such an organ. The patient is willing to consider such grafts.  LDLT: I discussed the nature of living donor liver transplant, including donor risks and more frequent recipient complications. The patient is willing to consider such grafts.

## 2019-12-03 NOTE — PROGRESS NOTES
Progress Note  Hospital Medicine  [facility] [place]         Patient Name: Luis Hurd  MRN:  [id number]  Hospital Medicine Team: [facility] HOSP MED L Mya Subramanian MD  Date of Admission:  11/25/2019     Length of Stay:  LOS: 7 days   Expected Discharge Date: 12/9/2019  Principal Problem:  Spontaneous bacterial peritonitis     Subjective:     Interval History/Overnight Events:    Doing well today,Mental status at baseline. No resp distress.  No fevers or chills. VSS  On micafungin for candida glabrata candademia.  Cr up to 8.3 today, BUN > 100  No signs of uremia, no lyte abnormalities nephro following, no needs for HD today.  ophtho saw pt and pt has benign eye exam.   Insurance issue resolved    Patient and family updated at bedside      ciprofloxacin HCl  250 mg Oral Daily    folic acid  1 mg Oral Daily    micafungin (MYCAMINE) IVPB  100 mg Intravenous Q24H    midodrine  15 mg Oral TID    polyethylene glycol  17 g Oral Daily    senna-docusate 8.6-50 mg  1 tablet Oral Daily    sevelamer carbonate  1,600 mg Oral TID WM    thiamine  100 mg Oral Daily           acetaminophen, bisacodyl, Dextrose 10% Bolus, Dextrose 10% Bolus, glucagon (human recombinant), glucose, glucose, glycerin 99.5% **AND** magnesium citrate **AND** sodium chloride 0.9%, ondansetron, promethazine (PHENERGAN) IVPB, sodium chloride 0.9%, traMADol    Review of Systems   Constitutional: Negative for chills, fatigue, fever.   HENT: Negative for sore throat, trouble swallowing.    Eyes: Negative for photophobia, visual disturbance.   Respiratory: Negative for cough, shortness of breath.    Cardiovascular: Negative for chest pain, palpitations, leg swelling.   Gastrointestinal: Negative for abdominal pain, constipation, diarrhea, nausea, vomiting.   Endocrine: Negative for cold intolerance, heat intolerance.   Genitourinary: Negative for dysuria, frequency.   Musculoskeletal: Negative for arthralgias, myalgias.   Skin:  Negative for rash, wound, erythema   Neurological: Negative for dizziness, syncope, weakness, light-headedness.   Psychiatric/Behavioral: Negative for confusion, hallucinations, anxiety  All other systems reviewed and are negative.    Objective:     Temp:  [96.6 °F (35.9 °C)-98.5 °F (36.9 °C)]   Pulse:  [84-91]   Resp:  [16-19]   BP: (109-137)/(54-64)   SpO2:  [92 %-100 %]       Physical Exam:  Constitutional: appears older than stated age, chronically ill looking,  non-distressed, not diaphoretic.   HENT: NC/AT, external ears normal, oropharynx clear, MMM w/o exudates.   Eyes: PERRL, EOMI, conjunctiva normal, no discharge b/l, +scleral icterus   Neck: normal ROM, supple  CV: RRR, no m/r/g, no carotid bruits, +2 peripheral pulses.  Pulmonary/Chest wall: Breathing comfortably w/o distress, CTAB, no w/r/r, no crackles.    GI: Soft, non-tender, (+) BS, (+) BM   +distended,  Caput medusa present,  Obese abdomen  Musculoskeletal: Normal ROM, no atrophy,  +trace pitting edema in LE bilaterally   Neurological: AAO x 4, CN II-XI in tact, nl sensation, nl strength/tone  No asterixis   Skin: warm, dry   +pallor, jaundice, + spider angiomas, +bruising   Psych: normal mood and affect, normal behavior, thought content and judgement.    Labs:    Chemistries:   Recent Labs   Lab 11/30/19  0553 12/01/19  0415 12/02/19  0331   * 128* 129*   K 4.3 4.4 5.1   CL 92* 92* 92*   CO2 23 19* 16*   BUN 91* 99* 108*   CREATININE 6.0* 7.1* 8.3*   CALCIUM 8.6* 8.7 8.6*   PROT 5.3* 5.4* 5.9*   BILITOT 9.1* 7.8* 8.6*   ALKPHOS 194* 184* 212*   ALT 32 26 26   * 95* 88*   MG 3.0* 3.1* 3.2*   PHOS 6.3* 6.5* 6.8*        WBC:   Recent Labs   Lab 11/29/19  0637 11/29/19  1635 11/30/19  0553 12/01/19  0415 12/02/19  0331   WBC 40.97* 34.15* 25.78* 23.73* 27.47*     Bands:     CBC/Anemia Labs: Coags:    Recent Labs   Lab 11/26/19  1005  11/30/19  0553 12/01/19  0415 12/02/19  0331   WBC 25.97*   < > 25.78* 23.73* 27.47*   HGB 10.1*   < >  7.9* 7.6* 7.9*   HCT 30.3*   < > 24.5* 23.5* 25.0*      < > 79* 71* 68*   *   < > 113* 113* 114*   RDW 13.9   < > 14.5 14.4 15.2*   IRON 36*  --   --   --   --    FERRITIN 2,782*  --   --   --   --    FOLATE 7.6  --   --   --   --    UDIAQUDI85 >2000*  --   --   --   --     < > = values in this interval not displayed.    Recent Labs   Lab 11/30/19  0553 12/01/19  0415 12/02/19  0331   INR 1.4* 1.3* 1.3*        Diagnostic Results:       Ref. Range 11/26/2019 07:45 11/29/2019 15:56   Fluid Color Unknown Yellow Yellow   Fluid Appearance Unknown Cloudy Cloudy   Body Fluid Type Unknown Ascites Ascites   WBC, Body Fluid Latest Units: /cu mm 855 468   Segs, Fluid Latest Units: % 49 27   Lymphs, Fluid Latest Units: % 13 36   Monocytes/Macrophages, Fluid Latest Units: % 37 34   Eos, Fluid Latest Units: %  2     Blood culture [[id number]] (Abnormal) Collected: 11/29/19 1143   Order Status: Completed Specimen: Blood Updated: 12/01/19 1206    Blood Culture, Routine Gram stain teresa bottle: yeast      Positive results previously called 12/01/2019  00:11     RITA GLABRATA   ID consult required at [facility] and [facility].   For susceptibility see order #[id number]   Abnormal    Narrative:     Collection has been rescheduled by T at 11/29/2019 11:02 Reason:   patient in bathroom.   Collection has been rescheduled by T at 11/29/2019 11:19 Reason:   Unable to collect  Collection has been rescheduled by T at 11/29/2019 11:02 Reason:   patient in bathroom.   Collection has been rescheduled by T at 11/29/2019 11:19 Reason:   Unable to collect         Assessment and Plan     Hospital Course:    Mr. Luis Hurd was admitted to Hospital Medicine for management of SBP , ARF, and  Decompensated cirrhosis/ liver failure     Active Hospital Problems    Diagnosis  POA    *Spontaneous bacterial peritonitis [K65.2]  Yes    Infection due to Candida glabrata [B37.9]  Yes    Candidemia [B37.7]  Yes     Acute liver failure without hepatic coma [K72.00]  Yes    Hepatorenal syndrome [K76.7]  Yes    Decompensated hepatic cirrhosis [K72.90]  Yes    Alcoholic cirrhosis of liver with ascites [K70.31]  Yes    Coagulopathy [D68.9]  Yes    Severe sepsis with acute organ dysfunction [A41.9, R65.20]  Yes    Anemia of chronic disease [D63.8]  Yes    Hyponatremia [E87.1]  Yes    Alcoholic hepatitis with ascites [K70.11]  Yes    Severe alcohol dependence [F10.20]  Yes    Acute pancreatitis [K85.90]  Yes    Acute renal failure with tubular necrosis [N17.0]  Yes    Macrocytic anemia [D53.9]  Yes    Severe protein-calorie malnutrition [E43]  Yes    Encounter for pre-transplant evaluation for liver transplant [Z01.818]  Not Applicable      Resolved Hospital Problems   No resolved problems to display.       Severe sepsis with organ dysfunction  SBP  - patient diagnosed on 11/21 and started on rocephin then switched to meropenem at OSH  - on 11/25, would be Day 5 of treatment, Zosyn  Started on presentation at [facility] on 11/25/2019   -  Patient underwent 4L paracentesis 11/26/2019, fluid analysis continues to show evidence of SBP with 850  WBCs in 49% segmented neutrophils.  WBC count in the periphery decreasing to 26 from 35.   -  Follow-up blood cultures- NGTD  -  Continue albumin for SBP treatment  -  MRCP on 11/26/2019 showed no pancreatic abscess,  Hepatic biliary obstruction. showing 1.4 cm pancreatic tail cyst  - will need repeat LVP/ diagnostic paracentesis on 11/29/2019 to ensure clearance of SBP  - p.o. fluconazole added on 11/28, given concern for worsening infection , given decompensating kidneys  - repeating diagnostic and therapeutic paracentesis on 11/29/2019 to ensure resolution of SBP- showed decrease in WBC count and resolution of SBP  - s/p  5 days of Zosyn and 5 days of fluconazole  - renally dosed PO cipro ppx for sbp going forward     Candidemia due to Candida Glabrata  - Blood cx from 11/29 with  "4/4 bottles with candida glabrata, resulted on 12/1.  Patient has been on empiric PO fluconazole therapy for possible fungal SBP, fluc stopped.   - source likely intra abdominal   - IV micafungin started on 12/1.   ID consulted.   - already had a TTE on 11/29 which showed no fungal mass  -ophtho consulted- benign eye exam      Acute liver failure without hepatic coma  Decompensated alcohol cirrhosis with ascites  Alcohol hepatitis with ascites  Severe alcohol dependence   MELD-Na score: 33 at 12/2/2019  3:31 AM  MELD score: 31 at 12/2/2019  3:31 AM  Calculated from:  Serum Creatinine: 8.3 mg/dL (Rounded to 4 mg/dL) at 12/2/2019  3:31 AM  Serum Sodium: 129 mmol/L at 12/2/2019  3:31 AM  Total Bilirubin: 8.6 mg/dL at 12/2/2019  3:31 AM  INR(ratio): 1.3 at 12/2/2019  3:31 AM  Age: 34 years     - patient's last drink about a month ago  Prior to presentation  - viral studies OSH largely negative  - thiamine, folate   - hepatology consulted  - addiction psych consulted   - PETH pending; urine tox + for opioids   - U/S liver with doppler shows "Findings consistent with cirrhosis and sequela of portal hypertension including a large volume of ascites, splenomegaly, and collateral vessel formation"   -  Treat SBP as above  -  Treat renal dysfunction as below  - echo wnl  - obtained financial approval from insurance company for initiation of liver transplant eval- undergoing liver transplant evaluation     LORENE  Hepatorenal Syndrome  - was being treated for HRS at OSH with rise in Cr with SBP. atient also had IV contrast and had large volume paracentesis and does not appear to have gotten albumin after.  Patient also with recent pancreatitis at the outside hospital, which may be contributing to renal dysfunction  -  Creatinine 3.1 on presentation at [facility]  - urine studies- review concerning for H RS  - cont albumin and octreotide   - nephrology consulted  - midodrine started on 11/27, in attempt to hyper perfuse kidneys   - " treat SBP and fungemia as above  - monitor urine output closely.  Nephrology is following.  Patient may require dialysis soon, currently no indications for RT at this time. Cr up to 7 on 12/1     Acute pancreatitis - resolved  - due to alcohol   - lipase improved from OSH;  Lipase here 540-->453  - CLD--> advance diet on 11/26  - MRCP 11/26- showed 1.4 cm lesion in the tail of the pancreas as described above.  Considerations include a pancreatic cyst, small pancreatic pseudocysts, or cystic neoplasm. No acute pancreatitis      Coagulopathy due to liver disease  -  Likely due to cirrhosis/  Current decompensation, liver failure  -  fibrinogen 262  - vitamin K for 3 days .  INR trended down to 1.4 with IV vitamin K     Hyponatremia  - fluid restrict . Na 132     Macrocytic anemia  Anemia of chronic disease  - hemoglobin 11-10, . Likely due to alcoholism and liver disease  -  Hemolysis labs reviewed, show some evidence of hemolysis  -  iron studies, ferritin and folate-  Showed evidence of anemia of chronic disease  -  Continue folate     Obesity   Severe protein-calorie malnutrition  Body mass index is 36.66 kg/m²  On admission --> Body mass index is 36.59 kg/m².  -  Albumin 2.4, pre-albumin 4. Likely due to alcoholism/  Cirrhosis,  In setting of acute decompensation  -  Advancing diet as tolerated  -  Boost  -  Dietary consult    Diet:  Low Na with fluid restriction   GI PPx:  PO PPI  DVT PPx:  SCDs due to coagulopathy   Goals of Care:  Full Patient has an Advance directive which was discussed with him and his parents .  Patient has has made wishes of withdrawal of care in the case of end-stage futile measures, if that were ever case.  He is interested in all aggressive measures, full code, transplantation, at this time    High Risk Conditions:  SUGEY, SBP    Disposition:    TBD.  Undergoing liver transplant evaluation.    If patient is approved for liver transplant, may require hospitalization until his  transplantation.    Given patient's decompensated status, he is low threshold for ICU admission      Patient's note was created using MModal Dictation.  Any errors in syntax may not have been identified and edited on initial review prior to signing this note.    Signing Physician:     Mya Subramanian MD  Department of Hospital Medicine   [facility] [place] [facility]  Pager [phone]  Spectra [phone]  12/2/2019

## 2019-12-03 NOTE — PROCEDURES
Radiology Post-Procedure Note    Pre Op Diagnosis: Ascites  Post Op Diagnosis: Same    Procedure: Paracentesis    Procedure performed by: Ezequiel Hunter MD    Written Informed Consent Obtained: Yes  Specimen Removed: YES   Estimated Blood Loss: less than 10 ml     Findings:   Successful paracentesis.  Albumin administered PRN per protocol.    Patient tolerated procedure well.    Ezequiel Hunter MD  Radiology R1

## 2019-12-03 NOTE — PROGRESS NOTES
[facility]  Infectious Disease  Progress Note    Patient Name: Luis Hurd  MRN: [id number]  Admission Date: 11/25/2019  Length of Stay: 8 days  Attending Physician: Ender Cervantes MD  Primary Care Provider: William Nettles MD    Isolation Status: No active isolations  Assessment/Plan:      Infection due to Candida glabrata  33 y/o M PMhx alcoholic cirrhosis c/b ascites on diuretics, morbid obesity who presented to OSH 11/20 w/ worsening abdominal pain and distension with LE edema s/p transfer to [facility] for liver txp evaluation and LORENE. Patient was empirically started on ceftriaxone for SBP. Afterwards underwent paracentesis 11/21 w/ removal 6.2L (820 segs (815 corrected)) & given albumin. CT abd/pelvis w/ IV cont 11/21 revealed mild diffuse fluid or stranding about pancreas & lipase 700. Consequently diagnosed w/ pancreatitis. He subsequently developed LORENE and he was started on treatment for presumed HRS. Given worsening leukocytosis (16.5->27) ID was consulted and antibiotics broadened to meropenem. Patient transferred to [facility] for liver transplant evaluation. Additionally patient now w/ fungemia (candida glabrata). ID is consulted for antibiotic recommendations. He received >7 days antibiotics w/ paracentesis 11/29 showing resolution of peritonitis (126 segs from 815). TTE unremarkable. Fungemia likely 2/2 GI translocation vs line infection.    Recommendations  C/w micafungin, plan for 14 days from negative blood cx (12/2/19) EOT 12/16  Midline removed, would proceed w/ line holiday for 2 days while awaiting repeat cx prior to placing another form of long term access   F/u repeat blood cultures  F/u ascites fluid cultures  No signs of fungal endophthalmitis per ophtho, appreciate assistance  C/w SBP prophylaxis, QTc >500, would consider alternate agent for prophylaxis such as ceftriaxone     Transplant work-up  Hepatitis B core, surface Ag, surface Ab negative  Hepatitis A IgG  positive  Hepatitis C Ab negative  Varicella Zoster IgG positive  Strongyloides Ab IgG negative  CMV IgG positive  HIV negative  RPR non-reactive  T-spot negative  States has received vaccinations through grade school    Needs  Hepatitis B vaccination  Prevnar followed by Pneumovax 8 weeks later  Shingrix  Tdap    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 Discussed w/ patient and father at bedside that receipt of vaccines have been temporally associated with GBS including combined diphtheria, pertussis and tetanus, rubella, tetanus toxoid, hepatitis B, hepatitis A, influenza, and meningococcal conjugate vaccines, however, in most cases, the association of vaccination with subsequent GBS is temporal only. Additionally, patient states he has received hepatitis B, and Tetanus in the past without incident. Informed him that hepatitis B, pneumococcal, shingrix, and Tdap vaccines were recommended prior to transplantation as he will require immunosuppressive therapy should he receive a transplant and that these measures were to decrease risk of morbidity and mortality following transplant. Patient is agreeable to receive vaccinations. Patient is currently very ill and there is no urgency to administer the above vaccinations.    We will sign off. Should his repeat blood cx or his ascites fluid culture result as positive please call us back.      Thank you for your consult. I will sign off. Please contact us if you have any additional questions.    Luis Alberto Gonzáles MD  Infectious Disease  [facility]    Subjective:     Principal Problem:Spontaneous bacterial peritonitis    HPI: 33 y/o M PMhx alcoholic cirrhosis c/b ascites on diuretics, morbid obesity who presented to OSH 11/20 w/ worsening abdominal pain and distension with LE edema s/p transfer to [facility] for liver txp evaluation and LORENE. Patient was empirically started on ceftriaxone for SBP. Afterwards underwent paracentesis 11/21 w/  removal 6.2L (820 segs (816 corrected)) & given albumin. CT abd/pelvis w/ IV cont 11/21 revealed mild diffuse fluid or stranding about pancreas & lipase 700. Consequently diagnosed w/ pancreatitis. He subsequently developed LORENE and he was started on treatment for presumed HRS. Given worsening leukocytosis (16.5->27) ID was consulted and antibiotics broadened to meropenem. Patient transferred to [facility] for liver transplant evaluation. Additionally patient now w/ fungemia (candida glabrata). ID is consulted for antibiotic recommendations.    Of note last drink ~1 month ago (started drinking ~15 yrs ago, with 2-3 wine bottles/day).  Interval History: Afebrile. Underwent paracentesis w/ removal 6.9L. Repeat blood cx NGTD. T-spot negative.    Review of Systems   Constitutional: Negative for appetite change and fever.   HENT: Negative for trouble swallowing.    Eyes: Negative for photophobia.   Respiratory: Negative for cough and shortness of breath.    Cardiovascular: Positive for leg swelling. Negative for chest pain.   Gastrointestinal: Positive for abdominal distention. Negative for abdominal pain, blood in stool, constipation, diarrhea, nausea and vomiting.   Genitourinary: Negative for flank pain.   Musculoskeletal: Negative for arthralgias and back pain.   Skin: Positive for color change.   Psychiatric/Behavioral: Negative for agitation and confusion.     Objective:     Vital Signs (Most Recent):  Temp: 97.5 °F (36.4 °C) (12/03/19 1641)  Pulse: 86 (12/03/19 1641)  Resp: 18 (12/03/19 1641)  BP: (!) 96/48 (12/03/19 1641)  SpO2: 97 % (12/03/19 1641) Vital Signs (24h Range):  Temp:  [97.4 °F (36.3 °C)-98.1 °F (36.7 °C)] 97.5 °F (36.4 °C)  Pulse:  [81-90] 86  Resp:  [16-20] 18  SpO2:  [95 %-100 %] 97 %  BP: ()/(48-75) 96/48     Weight: 129.3 kg (285 lb)  Body mass index is 36.59 kg/m².    Estimated Creatinine Clearance: 14.9 mL/min (A) (based on SCr of 10 mg/dL (H)).    Physical Exam   Constitutional: He is oriented  to person, place, and time. No distress.   HENT:   Head: Normocephalic and atraumatic.   Mouth/Throat: No oropharyngeal exudate.   Eyes: Right eye exhibits no discharge. Left eye exhibits no discharge. Scleral icterus is present.   Neck: Normal range of motion. Neck supple.   Cardiovascular: Normal rate and regular rhythm.   Pulmonary/Chest: Effort normal and breath sounds normal.   Abdominal: Soft. Bowel sounds are normal. He exhibits distension. There is no tenderness. There is no guarding.   Musculoskeletal: Normal range of motion. He exhibits edema. He exhibits no tenderness.   Neurological: He is alert and oriented to person, place, and time.   Skin: Skin is warm and dry. No rash noted. He is not diaphoretic.   Striae on abdomen and back   Psychiatric: He has a normal mood and affect. His behavior is normal.       Significant Labs:   Blood Culture:   Recent Labs   Lab 11/25/19  2207 11/25/19  2230 11/29/19  1133 11/29/19  1143 12/02/19  0331   LABBLOO No growth after 5 days. No growth after 5 days. Gram stain teresa bottle: yeast   Results called to and read back by: Alysha Khoury LPN  11/30/2019    21:19  Gram stain aer bottle: yeast   Positive results previously called  RITA GLABRATA  Susceptibility pending  ID consult required at [facility] and [facility].  * Gram stain teresa bottle: yeast   Gram stain aer bottle: yeast  Positive results previously called 12/01/2019  00:11  RITA GLABRATA  ID consult required at [facility] and [facility].  For susceptibility see order #[id number]  * No Growth to date  No Growth to date  No Growth to date  No Growth to date     CBC:   Recent Labs   Lab 12/02/19  0331 12/03/19  0529   WBC 27.47* 26.73*   HGB 7.9* 8.0*   HCT 25.0* 24.8*   PLT 68* 68*     CMP:   Recent Labs   Lab 12/02/19  0331 12/03/19  0529 12/03/19  1155   * 128* 129*   K 5.1 5.5* 5.4*   CL 92* 91* 92*   CO2 16* 18* 14*   GLU 97 115* 118*   * 118* 113*    CREATININE 8.3* 9.7* 10.0*   CALCIUM 8.6* 8.5* 8.5*   PROT 5.9* 5.7*  --    ALBUMIN 3.5 3.2*  --    BILITOT 8.6* 9.9*  --    ALKPHOS 212* 263*  --    AST 88* 94*  --    ALT 26 26  --    ANIONGAP 21* 19* 23*   EGFRNONAA 7.6* 6.3* 6.0*     Microbiology Results (last 7 days)     Procedure Component Value Units Date/Time    Blood culture [[id number]] Collected:  12/03/19 1617    Order Status:  Sent Specimen:  Blood Updated:  12/03/19 1653    Blood culture [[id number]] Collected:  12/03/19 1616    Order Status:  Sent Specimen:  Blood Updated:  12/03/19 1653    IV catheter culture [[id number]] Collected:  12/03/19 1100    Order Status:  Sent Specimen:  Catheter Tip, Peripheral Updated:  12/03/19 1451    Gram stain [[id number]] Collected:  12/03/19 1007    Order Status:  Completed Specimen:  Body Fluid from Peritoneal Fluid Updated:  12/03/19 1254     Gram Stain Result No WBC's      No organisms seen    AFB culture [[id number]] Collected:  12/03/19 1007    Order Status:  Sent Specimen:  Body Fluid from Peritoneal Fluid Updated:  12/03/19 1142    Fungus culture [[id number]] Collected:  12/03/19 1007    Order Status:  Sent Specimen:  Body Fluid from Peritoneal Fluid Updated:  12/03/19 1142    Culture, Body Fluid - Bactec [[id number]] Collected:  12/03/19 1007    Order Status:  Sent Specimen:  Body Fluid from Peritoneal Fluid Updated:  12/03/19 1142    Culture, Anaerobe [[id number]] Collected:  11/29/19 1556    Order Status:  Completed Specimen:  Body Fluid from Ascites Updated:  12/03/19 1028     Anaerobic Culture Culture in progress    Fungus culture [[id number]] Collected:  11/29/19 1556    Order Status:  Completed Specimen:  Body Fluid from Ascites Updated:  12/03/19 0918     Fungus (Mycology) Culture Culture in progress    Blood culture [[id number]]  (Abnormal) Collected:  11/29/19 1143    Order Status:  Completed Specimen:  Blood Updated:  12/03/19 0912     Blood Culture, Routine Gram stain teresa bottle: yeast       Gram stain  aer bottle: yeast      Positive results previously called 12/01/2019  00:11      RITA GLABRATA  ID consult required at [facility] [place] and [facility].  For susceptibility see order #[id number]      Narrative:       Collection has been rescheduled by VHT at 11/29/2019 11:02 Reason:   patient in bathroom.   Collection has been rescheduled by VHT at 11/29/2019 11:19 Reason:   Unable to collect  Collection has been rescheduled by VHT at 11/29/2019 11:02 Reason:   patient in bathroom.   Collection has been rescheduled by VHT at 11/29/2019 11:19 Reason:   Unable to collect    Culture, Anaerobe [[id number]] Collected:  11/26/19 0744    Order Status:  Completed Specimen:  Body Fluid from Peritoneal Fluid Updated:  12/03/19 0719     Anaerobic Culture Culture in progress    Blood culture [[id number]] Collected:  12/02/19 0331    Order Status:  Completed Specimen:  Blood Updated:  12/03/19 0612     Blood Culture, Routine No Growth to date      No Growth to date    Blood culture [[id number]] Collected:  12/02/19 0331    Order Status:  Completed Specimen:  Blood Updated:  12/03/19 0612     Blood Culture, Routine No Growth to date      No Growth to date    Blood culture [[id number]]  (Abnormal) Collected:  11/29/19 1133    Order Status:  Completed Specimen:  Blood Updated:  12/03/19 0245     Blood Culture, Routine Gram stain teresa bottle: yeast       Results called to and read back by: Alysha Khoury LPN  11/30/2019        21:19      Gram stain aer bottle: yeast       Positive results previously called      RITA GLABRATA  Susceptibility pending  ID consult required [facility] and [facility] locations.      Narrative:       Collection has been rescheduled by T at 11/29/2019 11:02 Reason:   patient in bathroom.   Collection has been rescheduled by VHT at 11/29/2019 11:19 Reason:   Unable to collect  Collection has been rescheduled by VHT at 11/29/2019 11:02 Reason:   patient in bathroom.   Collection has been  rescheduled by VHT at 11/29/2019 11:19 Reason:   Unable to collect    Aerobic culture [[id number]] Collected:  11/29/19 1556    Order Status:  Completed Specimen:  Body Fluid from Ascites Updated:  12/02/19 0852     Aerobic Bacterial Culture No growth    Blood culture [[id number]] Collected:  11/25/19 2207    Order Status:  Completed Specimen:  Blood Updated:  11/30/19 2312     Blood Culture, Routine No growth after 5 days.    Blood culture [[id number]] Collected:  11/25/19 2230    Order Status:  Completed Specimen:  Blood Updated:  11/30/19 2312     Blood Culture, Routine No growth after 5 days.    Urine culture [[id number]] Collected:  11/29/19 1338    Order Status:  Completed Specimen:  Urine Updated:  11/30/19 1928     Urine Culture, Routine No growth    Narrative:       Preferred Collection Type->Urine, Clean Catch    Gram stain [[id number]] Collected:  11/29/19 1556    Order Status:  Completed Specimen:  Body Fluid from Ascites Updated:  11/29/19 1814     Gram Stain Result Rare WBC's      No organisms seen    Aerobic culture [[id number]] Collected:  11/26/19 0744    Order Status:  Completed Specimen:  Body Fluid from Peritoneal Fluid Updated:  11/29/19 1001     Aerobic Bacterial Culture No growth          Significant Imaging: I have reviewed all pertinent imaging results/findings within the past 24 hours.

## 2019-12-03 NOTE — PLAN OF CARE
12/03/19 1608   Discharge Reassessment   Assessment Type Discharge Planning Reassessment   Provided patient/caregiver education on the expected discharge date and the discharge plan Yes   Do you have any problems affording any of your prescribed medications? No   Discharge Plan A Home with family   DME Needed Upon Discharge  other (see comments)  (TBD)   Anticipated Discharge Disposition Home   Post-Acute Status   Post-Acute Authorization Other   Other Status No Post-Acute Service Needs   Discharge Delays None known at this time

## 2019-12-03 NOTE — SUBJECTIVE & OBJECTIVE
Interval History: Afebrile. Underwent paracentesis w/ removal 6.9L. Repeat blood cx NGTD. T-spot negative.    Review of Systems   Constitutional: Negative for appetite change and fever.   HENT: Negative for trouble swallowing.    Eyes: Negative for photophobia.   Respiratory: Negative for cough and shortness of breath.    Cardiovascular: Positive for leg swelling. Negative for chest pain.   Gastrointestinal: Positive for abdominal distention. Negative for abdominal pain, blood in stool, constipation, diarrhea, nausea and vomiting.   Genitourinary: Negative for flank pain.   Musculoskeletal: Negative for arthralgias and back pain.   Skin: Positive for color change.   Psychiatric/Behavioral: Negative for agitation and confusion.     Objective:     Vital Signs (Most Recent):  Temp: 97.5 °F (36.4 °C) (12/03/19 1641)  Pulse: 86 (12/03/19 1641)  Resp: 18 (12/03/19 1641)  BP: (!) 96/48 (12/03/19 1641)  SpO2: 97 % (12/03/19 1641) Vital Signs (24h Range):  Temp:  [97.4 °F (36.3 °C)-98.1 °F (36.7 °C)] 97.5 °F (36.4 °C)  Pulse:  [81-90] 86  Resp:  [16-20] 18  SpO2:  [95 %-100 %] 97 %  BP: ()/(48-75) 96/48     Weight: 129.3 kg (285 lb)  Body mass index is 36.59 kg/m².    Estimated Creatinine Clearance: 14.9 mL/min (A) (based on SCr of 10 mg/dL (H)).    Physical Exam   Constitutional: He is oriented to person, place, and time. No distress.   HENT:   Head: Normocephalic and atraumatic.   Mouth/Throat: No oropharyngeal exudate.   Eyes: Right eye exhibits no discharge. Left eye exhibits no discharge. Scleral icterus is present.   Neck: Normal range of motion. Neck supple.   Cardiovascular: Normal rate and regular rhythm.   Pulmonary/Chest: Effort normal and breath sounds normal.   Abdominal: Soft. Bowel sounds are normal. He exhibits distension. There is no tenderness. There is no guarding.   Musculoskeletal: Normal range of motion. He exhibits edema. He exhibits no tenderness.   Neurological: He is alert and oriented to  person, place, and time.   Skin: Skin is warm and dry. No rash noted. He is not diaphoretic.   Striae on abdomen and back   Psychiatric: He has a normal mood and affect. His behavior is normal.       Significant Labs:   Blood Culture:   Recent Labs   Lab 11/25/19 2207 11/25/19  2230 11/29/19  1133 11/29/19  1143 12/02/19  0331   LABBLOO No growth after 5 days. No growth after 5 days. Gram stain teresa bottle: yeast   Results called to and read back by: Alysha Khoury LPN  11/30/2019    21:19  Gram stain aer bottle: yeast   Positive results previously called  RITA GLABRATA  Susceptibility pending  ID consult required at [facility] and [facility] locations.  * Gram stain teresa bottle: yeast   Gram stain aer bottle: yeast  Positive results previously called 12/01/2019  00:11  RITA GLABRATA  ID consult required at [facility] and [facility] locations.  For susceptibility see order #[id number]  * No Growth to date  No Growth to date  No Growth to date  No Growth to date     CBC:   Recent Labs   Lab 12/02/19  0331 12/03/19  0529   WBC 27.47* 26.73*   HGB 7.9* 8.0*   HCT 25.0* 24.8*   PLT 68* 68*     CMP:   Recent Labs   Lab 12/02/19  0331 12/03/19  0529 12/03/19  1155   * 128* 129*   K 5.1 5.5* 5.4*   CL 92* 91* 92*   CO2 16* 18* 14*   GLU 97 115* 118*   * 118* 113*   CREATININE 8.3* 9.7* 10.0*   CALCIUM 8.6* 8.5* 8.5*   PROT 5.9* 5.7*  --    ALBUMIN 3.5 3.2*  --    BILITOT 8.6* 9.9*  --    ALKPHOS 212* 263*  --    AST 88* 94*  --    ALT 26 26  --    ANIONGAP 21* 19* 23*   EGFRNONAA 7.6* 6.3* 6.0*     Microbiology Results (last 7 days)     Procedure Component Value Units Date/Time    Blood culture [[id number]] Collected:  12/03/19 1617    Order Status:  Sent Specimen:  Blood Updated:  12/03/19 1653    Blood culture [[id number]] Collected:  12/03/19 1616    Order Status:  Sent Specimen:  Blood Updated:  12/03/19 1653    IV catheter culture [[id number]] Collected:  12/03/19 1100    Order  Status:  Sent Specimen:  Catheter Tip, Peripheral Updated:  12/03/19 1451    Gram stain [[id number]] Collected:  12/03/19 1007    Order Status:  Completed Specimen:  Body Fluid from Peritoneal Fluid Updated:  12/03/19 1254     Gram Stain Result No WBC's      No organisms seen    AFB culture [[id number]] Collected:  12/03/19 1007    Order Status:  Sent Specimen:  Body Fluid from Peritoneal Fluid Updated:  12/03/19 1142    Fungus culture [[id number]] Collected:  12/03/19 1007    Order Status:  Sent Specimen:  Body Fluid from Peritoneal Fluid Updated:  12/03/19 1142    Culture, Body Fluid - Bactec [[id number]] Collected:  12/03/19 1007    Order Status:  Sent Specimen:  Body Fluid from Peritoneal Fluid Updated:  12/03/19 1142    Culture, Anaerobe [[id number]] Collected:  11/29/19 1556    Order Status:  Completed Specimen:  Body Fluid from Ascites Updated:  12/03/19 1028     Anaerobic Culture Culture in progress    Fungus culture [[id number]] Collected:  11/29/19 1556    Order Status:  Completed Specimen:  Body Fluid from Ascites Updated:  12/03/19 0918     Fungus (Mycology) Culture Culture in progress    Blood culture [[id number]]  (Abnormal) Collected:  11/29/19 1143    Order Status:  Completed Specimen:  Blood Updated:  12/03/19 0912     Blood Culture, Routine Gram stain teresa bottle: yeast       Gram stain aer bottle: yeast      Positive results previously called 12/01/2019  00:11      RITA GLABRATA  ID consult required at [facility] and [facility].  For susceptibility see order #[id number]      Narrative:       Collection has been rescheduled by VHT at 11/29/2019 11:02 Reason:   patient in bathroom.   Collection has been rescheduled by VHT at 11/29/2019 11:19 Reason:   Unable to collect  Collection has been rescheduled by VHT at 11/29/2019 11:02 Reason:   patient in bathroom.   Collection has been rescheduled by VHT at 11/29/2019 11:19 Reason:   Unable to collect    Culture, Anaerobe [[id number]] Collected:   11/26/19 0744    Order Status:  Completed Specimen:  Body Fluid from Peritoneal Fluid Updated:  12/03/19 0719     Anaerobic Culture Culture in progress    Blood culture [[id number]] Collected:  12/02/19 0331    Order Status:  Completed Specimen:  Blood Updated:  12/03/19 0612     Blood Culture, Routine No Growth to date      No Growth to date    Blood culture [[id number]] Collected:  12/02/19 0331    Order Status:  Completed Specimen:  Blood Updated:  12/03/19 0612     Blood Culture, Routine No Growth to date      No Growth to date    Blood culture [[id number]]  (Abnormal) Collected:  11/29/19 1133    Order Status:  Completed Specimen:  Blood Updated:  12/03/19 0245     Blood Culture, Routine Gram stain teresa bottle: yeast       Results called to and read back by: Alysha Khoury LPN  11/30/2019        21:19      Gram stain aer bottle: yeast       Positive results previously called      RITA GLABRATA  Susceptibility pending  ID consult required at [facility] and [facility].      Narrative:       Collection has been rescheduled by VHT at 11/29/2019 11:02 Reason:   patient in bathroom.   Collection has been rescheduled by VHT at 11/29/2019 11:19 Reason:   Unable to collect  Collection has been rescheduled by VHT at 11/29/2019 11:02 Reason:   patient in bathroom.   Collection has been rescheduled by VHT at 11/29/2019 11:19 Reason:   Unable to collect    Aerobic culture [[id number]] Collected:  11/29/19 1556    Order Status:  Completed Specimen:  Body Fluid from Ascites Updated:  12/02/19 0852     Aerobic Bacterial Culture No growth    Blood culture [[id number]] Collected:  11/25/19 2207    Order Status:  Completed Specimen:  Blood Updated:  11/30/19 2312     Blood Culture, Routine No growth after 5 days.    Blood culture [[id number]] Collected:  11/25/19 2230    Order Status:  Completed Specimen:  Blood Updated:  11/30/19 2312     Blood Culture, Routine No growth after 5 days.    Urine culture [[id number]]  Collected:  11/29/19 1338    Order Status:  Completed Specimen:  Urine Updated:  11/30/19 1928     Urine Culture, Routine No growth    Narrative:       Preferred Collection Type->Urine, Clean Catch    Gram stain [[id number]] Collected:  11/29/19 1556    Order Status:  Completed Specimen:  Body Fluid from Ascites Updated:  11/29/19 1814     Gram Stain Result Rare WBC's      No organisms seen    Aerobic culture [[id number]] Collected:  11/26/19 0744    Order Status:  Completed Specimen:  Body Fluid from Peritoneal Fluid Updated:  11/29/19 1001     Aerobic Bacterial Culture No growth          Significant Imaging: I have reviewed all pertinent imaging results/findings within the past 24 hours.

## 2019-12-03 NOTE — PROGRESS NOTES
[facility]  Infectious Disease  Progress Note    Patient Name: Luis Hurd  MRN: [id number]  Admission Date: 11/25/2019  Length of Stay: 7 days  Attending Physician: Mya Subramanian MD  Primary Care Provider: William Nettles MD    Isolation Status: No active isolations  Assessment/Plan:      Infection due to Candida glabrata  35 y/o M PMhx alcoholic cirrhosis c/b ascites on diuretics, morbid obesity who presented to OSH 11/20 w/ worsening abdominal pain and distension with LE edema s/p transfer to [facility] for liver txp evaluation and LORENE. Patient was empirically started on ceftriaxone for SBP. Afterwards underwent paracentesis 11/21 w/ removal 6.2L (820 segs (815 corrected)) & given albumin. CT abd/pelvis w/ IV cont 11/21 revealed mild diffuse fluid or stranding about pancreas & lipase 700. Consequently diagnosed w/ pancreatitis. He subsequently developed LORENE and he was started on treatment for presumed HRS. Given worsening leukocytosis (16.5->27) ID was consulted and antibiotics broadened to meropenem. Patient transferred to [facility] for liver transplant evaluation. Additionally patient now w/ fungemia (candida glabrata). ID is consulted for antibiotic recommendations. He received >7 days antibiotics w/ paracentesis 11/29 showing resolution of peritonitis (126 segs from 815). TTE unremarkable. Fungemia likely 2/2 GI translocation vs line infection.    Recommendations  C/w micafungin  Would remove midline and proceed w/ line holiday for 2 days prior to placing another form of long term access while awaiting repeat cx  F/u repeat blood cultures  No signs of fungal endophthalmitis per ophtho, appreciate assistance  C/w SBP prophylaxis    Transplant work-up  Hepatitis B core, surface Ag, surface Ab negative  Hepatitis A IgG positive  Hepatitis C Ab negative  Varicella Zoster IgG positive  Strongyloides Ab IgG negative  CMV IgG positive  HIV negative  RPR non-reactive  States has received vaccinations  through grade school    Needs  Hepatitis B vaccination  F/u Tspot  Prevnar followed by Pneumovax 8 weeks later  Shingrix  Tdap    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 Will review the above vaccinations regarding any temporal relationships with GBS and will discuss with patient tomorrow.        Thank you for your consult. I will follow-up with patient. Please contact us if you have any additional questions.    Luis Alberto Gonzáles MD  Infectious Disease  [facility]    Subjective:     Principal Problem:Spontaneous bacterial peritonitis    HPI: 33 y/o M PMhx alcoholic cirrhosis c/b ascites on diuretics, morbid obesity who presented to OSH 11/20 w/ worsening abdominal pain and distension with LE edema s/p transfer to [facility] for liver txp evaluation and LORENE. Patient was empirically started on ceftriaxone for SBP. Afterwards underwent paracentesis 11/21 w/ removal 6.2L (820 segs (816 corrected)) & given albumin. CT abd/pelvis w/ IV cont 11/21 revealed mild diffuse fluid or stranding about pancreas & lipase 700. Consequently diagnosed w/ pancreatitis. He subsequently developed LORENE and he was started on treatment for presumed HRS. Given worsening leukocytosis (16.5->27) ID was consulted and antibiotics broadened to meropenem. Patient transferred to [facility] for liver transplant evaluation. Additionally patient now w/ fungemia (candida glabrata). ID is consulted for antibiotic recommendations.    Of note last drink ~1 month ago (started drinking ~15 yrs ago, with 2-3 wine bottles/day).  Interval History: Repeat blood cx performed today. Optho consulted to r/o endophthalmitis.     Review of Systems   Constitutional: Positive for activity change and fatigue. Negative for appetite change and fever.   HENT: Negative for trouble swallowing.    Eyes: Negative for photophobia.   Respiratory: Negative for cough and shortness of breath.    Cardiovascular: Positive for leg swelling. Negative for chest  pain.   Gastrointestinal: Positive for abdominal distention. Negative for abdominal pain, blood in stool, constipation, diarrhea, nausea and vomiting.   Genitourinary: Negative for flank pain.   Musculoskeletal: Negative for arthralgias and back pain.   Skin: Positive for color change.   Allergic/Immunologic: Positive for immunocompromised state.   Psychiatric/Behavioral: Negative for agitation and confusion.     Objective:     Vital Signs (Most Recent):  Temp: 96.6 °F (35.9 °C) (12/02/19 1647)  Pulse: 91 (12/02/19 1647)  Resp: 19 (12/02/19 1647)  BP: (!) 112/54 (12/02/19 1647)  SpO2: (!) 94 % (12/02/19 1647) Vital Signs (24h Range):  Temp:  [96.6 °F (35.9 °C)-98.5 °F (36.9 °C)] 96.6 °F (35.9 °C)  Pulse:  [] 91  Resp:  [17-19] 19  SpO2:  [92 %-100 %] 94 %  BP: (109-137)/(54-64) 112/54     Weight: 129.3 kg (285 lb)  Body mass index is 36.59 kg/m².    Estimated Creatinine Clearance: 17.9 mL/min (A) (based on SCr of 8.3 mg/dL (H)).    Physical Exam   Constitutional: He is oriented to person, place, and time. No distress.   HENT:   Head: Normocephalic and atraumatic.   Mouth/Throat: No oropharyngeal exudate.   Eyes: Right eye exhibits no discharge. Left eye exhibits no discharge. Scleral icterus is present.   Neck: Normal range of motion. Neck supple.   Cardiovascular: Normal rate and regular rhythm.   R midline   Pulmonary/Chest: Effort normal and breath sounds normal.   Abdominal: Soft. Bowel sounds are normal. He exhibits distension. There is no tenderness. There is no guarding.   Musculoskeletal: Normal range of motion. He exhibits edema. He exhibits no tenderness.   Neurological: He is alert and oriented to person, place, and time.   Skin: Skin is warm and dry. No rash noted. He is not diaphoretic.   Striae on abdomen and back   Psychiatric: He has a normal mood and affect. His behavior is normal.       Significant Labs:   Blood Culture:   Recent Labs   Lab 11/25/19 2207 11/25/19  2239 11/29/19  1131  11/29/19  1143 12/02/19  0331   LABBLOO No growth after 5 days. No growth after 5 days. Gram stain teresa bottle: yeast   Results called to and read back by: Alysha Khoury LPN  11/30/2019    21:19  CANDIDA GLABRATA  Susceptibility pending  ID consult required at [facility] and [facility] locations.  * Gram stain teresa bottle: yeast   Positive results previously called 12/01/2019  00:11  RITA GLABRATA  ID consult required at [facility] and [facility].  For susceptibility see order #[id number]  * No Growth to date  No Growth to date     CBC:   Recent Labs   Lab 12/01/19  0415 12/02/19 0331   WBC 23.73* 27.47*   HGB 7.6* 7.9*   HCT 23.5* 25.0*   PLT 71* 68*     CMP:   Recent Labs   Lab 12/01/19 0415 12/02/19 0331   * 129*   K 4.4 5.1   CL 92* 92*   CO2 19* 16*    97   BUN 99* 108*   CREATININE 7.1* 8.3*   CALCIUM 8.7 8.6*   PROT 5.4* 5.9*   ALBUMIN 3.3* 3.5   BILITOT 7.8* 8.6*   ALKPHOS 184* 212*   AST 95* 88*   ALT 26 26   ANIONGAP 17* 21*   EGFRNONAA 9.1* 7.6*     Microbiology Results (last 7 days)     Procedure Component Value Units Date/Time    Culture, Body Fluid - Bactec [[id number]]     Order Status:  No result Specimen:  Body Fluid from Peritoneal Fluid     Gram stain [[id number]]     Order Status:  No result Specimen:  Body Fluid from Peritoneal Fluid     AFB culture [[id number]]     Order Status:  No result Specimen:  Body Fluid from Peritoneal Fluid     Fungus culture [[id number]]     Order Status:  No result Specimen:  Ascites from Peritoneal Fluid     Culture, Anaerobe [[id number]] Collected:  11/29/19 1556    Order Status:  Completed Specimen:  Body Fluid from Ascites Updated:  12/02/19 1357     Anaerobic Culture Culture in progress    Blood culture [[id number]] Collected:  12/02/19 0331    Order Status:  Completed Specimen:  Blood Updated:  12/02/19 1115     Blood Culture, Routine No Growth to date    Blood culture [[id number]] Collected:  12/02/19 0331    Order  Status:  Completed Specimen:  Blood Updated:  12/02/19 1115     Blood Culture, Routine No Growth to date    Culture, Anaerobe [[id number]] Collected:  11/26/19 0744    Order Status:  Completed Specimen:  Body Fluid from Peritoneal Fluid Updated:  12/02/19 1036     Anaerobic Culture Culture in progress    Blood culture [[id number]]  (Abnormal) Collected:  11/29/19 1143    Order Status:  Completed Specimen:  Blood Updated:  12/02/19 0935     Blood Culture, Routine Gram stain teresa bottle: yeast       Positive results previously called 12/01/2019  00:11      RITA GLABRATA  ID consult required at [facility].  For susceptibility see order #[id number]      Narrative:       Collection has been rescheduled by T at 11/29/2019 11:02 Reason:   patient in bathroom.   Collection has been rescheduled by T at 11/29/2019 11:19 Reason:   Unable to collect  Collection has been rescheduled by T at 11/29/2019 11:02 Reason:   patient in bathroom.   Collection has been rescheduled by T at 11/29/2019 11:19 Reason:   Unable to collect    Blood culture [[id number]]  (Abnormal) Collected:  11/29/19 1133    Order Status:  Completed Specimen:  Blood Updated:  12/02/19 0934     Blood Culture, Routine Gram stain teresa bottle: yeast       Results called to and read back by: Alysha Khoury LPN  11/30/2019        21:19      RITA GLABRATA  Susceptibility pending  ID consult required at [facility] and [facility].      Narrative:       Collection has been rescheduled by T at 11/29/2019 11:02 Reason:   patient in bathroom.   Collection has been rescheduled by T at 11/29/2019 11:19 Reason:   Unable to collect  Collection has been rescheduled by T at 11/29/2019 11:02 Reason:   patient in bathroom.   Collection has been rescheduled by T at 11/29/2019 11:19 Reason:   Unable to collect    Aerobic culture [[id number]] Collected:  11/29/19 1556    Order Status:  Completed Specimen:  Body Fluid from  Ascites Updated:  12/02/19 0852     Aerobic Bacterial Culture No growth    Blood culture [[id number]] Collected:  11/25/19 2207    Order Status:  Completed Specimen:  Blood Updated:  11/30/19 2312     Blood Culture, Routine No growth after 5 days.    Blood culture [[id number]] Collected:  11/25/19 2230    Order Status:  Completed Specimen:  Blood Updated:  11/30/19 2312     Blood Culture, Routine No growth after 5 days.    Urine culture [[id number]] Collected:  11/29/19 1338    Order Status:  Completed Specimen:  Urine Updated:  11/30/19 1928     Urine Culture, Routine No growth    Narrative:       Preferred Collection Type->Urine, Clean Catch    Gram stain [[id number]] Collected:  11/29/19 1556    Order Status:  Completed Specimen:  Body Fluid from Ascites Updated:  11/29/19 1814     Gram Stain Result Rare WBC's      No organisms seen    Fungus culture [[id number]] Collected:  11/29/19 1556    Order Status:  Sent Specimen:  Body Fluid from Ascites Updated:  11/29/19 1717    Aerobic culture [[id number]] Collected:  11/26/19 0744    Order Status:  Completed Specimen:  Body Fluid from Peritoneal Fluid Updated:  11/29/19 1001     Aerobic Bacterial Culture No growth          Significant Imaging: I have reviewed all pertinent imaging results/findings within the past 24 hours.

## 2019-12-03 NOTE — PROGRESS NOTES
"ADDICTION CONSULT FOLLOW UP VISIT     DEPARTMENT:  Psychiatry  SITE: [facility] [address]    DATE OF ADMISSION: 11/25/2019  8:50 PM  LENGTH OF STAY: 8 days    EXAMINING PRACTITIONER: Eugenio Marcelo      SUBJECTIVE:     HISTORY    Patient Name: Luis Hurd  YOB: 1985    CHIEF COMPLAINT   Luis Hurd is a 34 y.o. male who is being seen today for a follow up visit by the addiction psychiatry consult service.  Luis Hurd presents with the chief complaint of: alcohol use disorder    HPI & PSYCHIATRIC ROS    Patient states he remains motivated for sobriety.  Last drink about a month ago.  I did reinforce with him the need for formal rehab program and 12 step meeting involvement to help mitigate his high risk transplant status.  He is concerned about rehab affecting work status.  He is open to 12 step meetings.        PFSH: The patient's past medical history has been reviewed and updated as appropriate within the electronic medical record system.      PSYCHOTROPIC MEDICATIONS   None      OBJECTIVE:     EXAMINATION    Vitals:    12/03/19 0403 12/03/19 0607 12/03/19 0744 12/03/19 0751   BP:   110/62    BP Location:       Patient Position:       Pulse: 84 82 88 83   Resp:   18    Temp:   97.4 °F (36.3 °C)    TempSrc:       SpO2:   95%    Weight:       Height:           CONSTITUTIONAL  General Appearance: stated age, notable ascites, sitting up in bed    PSYCHIATRIC   Speech: spontaneous, conversational  Mood: "fine"  Affect: euthymic  Thought Process: linear  Thought Content: no SI  Insight: improving  Judgment: improving      ASSESSMENT:     DIAGNOSES & PROBLEMS    Alcohol Use Disorder  Alcohol Cirrhosis  Acute Renal Failure        PLAN:       MANAGEMENT PLAN, TREATMENT GOALS, THERAPEUTIC TECHNIQUES/APPROACHES & CLINICAL REASONING    Relapse prevention and motivational interviewing provided.  Patient counseled on the need for formal rehab program - IOP vs residential - as part " of aftercare plan.  Patient counseled on 12 step meeting attendance.        DIAGNOSTIC TESTING  The chart was reviewed for recent diagnostic investigations, and pertinent results are noted below.  11/26/19 - utox positive for opiates  11/25/19 - PETH pending

## 2019-12-03 NOTE — ASSESSMENT & PLAN NOTE
Unclear at this time etiology of LORENE, but likely from HRS (hyponatremia, low/normal Bps) vs prolonged prerenal or volume depletion after paracentesis 6L removal at prior hospital  - Renal US, no significant abnormalities seen in kidneys  - U/P Cr ratio: 0.23; urine sodium<20  - Cr is worsening. UOP decreasing - reports having bowel movements and micturition with bm - unable to quantify how much.  - Central line removed in setting of fungemia. Per primary team, trying to give patient some time without central line.    Plan:   - Continue to monitor UOP. Currently oliguric. Low threshold for RRT. Currently mentating well, can hold off on dialysis  - Recommend 120 of lasix to help with electrolyte status.   - Strict I/Os and chart  - MAP >65  - Hb > 7 gm/dL  - Avoid nephrotoxic agents, NSAIDs, IV contrast, etc  - Will follow closely

## 2019-12-03 NOTE — PROGRESS NOTES
[facility]  Nephrology  Progress Note    Patient Name: Luis Hurd  MRN: [id number]  Admission Date: 11/25/2019  Hospital Length of Stay: 8 days  Attending Provider: Ender Cervantes MD   Primary Care Physician: William Nettles MD  Principal Problem:Spontaneous bacterial peritonitis    Subjective:     HPI: 35 y/o man with hx of alcohol abuse, alcoholic cirrhosis decompensated with ascites,  morbid obesity, no history of CKD baseline sCr 0.7 (as of 11/20/2019), admitted on 11/25/2019 as a transfer from OSH for liver txp evaluation and LORENE, concern for HRS.  Originally presented on 11/20 to OSH with worsening abdominal pain and distention, along with increased lower extremity edema, and also diagnosed with SBP and pancreatitis.  Last drink ~1 month ago (started drinking ~15 yrs ago, with 2-3 wine bottles/day).  On admit, sCr 0.7, had paracentesis with ~6L removal, administered albumin post procedure, no report of acute hypotension during previous hospital stay, yet subsequently developed rise in sCr to 1.63 on 11/22, 2.39 on 11/23, 2.44 on 11/25, and labs on arrival to [facility] on 11/25 up to 3.1.  He was initiated in HRS treatment, receiving albumin, octreotide, and midodrine.  Ofr SBP started on ceftriaxone.      Nephrology consulted for evaluation of Lorene.      Interval History: Continues to have worsening kidney function and now with hyperkalemia at 5.5. In concern with fungemia, central line removed. Also went for IR paracenteisis with 6L removed.     Review of patient's allergies indicates:  No Known Allergies  Current Facility-Administered Medications   Medication Frequency    acetaminophen tablet 325 mg Q4H PRN    bisacodyl suppository 10 mg Daily PRN    ciprofloxacin HCl tablet 250 mg Daily    dextrose 10% (D10W) Bolus PRN    dextrose 10% (D10W) Bolus PRN    folic acid tablet 1 mg Daily    glucagon (human recombinant) injection 1 mg PRN    glucose chewable tablet 16 g PRN    glucose  chewable tablet 24 g PRN    glycerin 99.5% topical solution 100 mL Once PRN    And    magnesium citrate solution 296 mL Once PRN    And    sodium chloride 0.9% bolus 500 mL Once PRN    micafungin 100 mg in sodium chloride 0.9 % 100 mL IVPB (ready to mix system) Q24H    midodrine tablet 15 mg TID    ondansetron tablet 8 mg Q6H PRN    oxyCODONE immediate release tablet 5 mg Q6H PRN    polyethylene glycol packet 17 g Daily    promethazine (PHENERGAN) 12.5 mg in dextrose 5 % 50 mL IVPB Q6H PRN    senna-docusate 8.6-50 mg per tablet 1 tablet BID    sevelamer carbonate tablet 1,600 mg TID WM    sodium chloride 0.9% flush 10 mL PRN    thiamine tablet 100 mg Daily    traMADol tablet 50 mg Q6H PRN       Objective:     Vital Signs (Most Recent):  Temp: 97.6 °F (36.4 °C) (12/03/19 1222)  Pulse: 88 (12/03/19 1222)  Resp: 18 (12/03/19 1222)  BP: (!) 111/57 (12/03/19 1222)  SpO2: 95 % (12/03/19 1222)  O2 Device (Oxygen Therapy): room air (12/02/19 2000) Vital Signs (24h Range):  Temp:  [96.6 °F (35.9 °C)-98.1 °F (36.7 °C)] 97.6 °F (36.4 °C)  Pulse:  [82-91] 88  Resp:  [16-20] 18  SpO2:  [94 %-100 %] 95 %  BP: (106-139)/(53-75) 111/57     Weight: 129.3 kg (285 lb) (11/29/19 1300)  Body mass index is 36.59 kg/m².  Body surface area is 2.6 meters squared.    I/O last 3 completed shifts:  In: 1085 [P.O.:1085]  Out: 275 [Urine:275]    Physical Exam   Constitutional: He is oriented to person, place, and time. He appears well-developed and well-nourished.   HENT:   Head: Normocephalic and atraumatic.   Nose: Nose normal.   Mouth/Throat: No oropharyngeal exudate.   Eyes: Pupils are equal, round, and reactive to light. Conjunctivae and EOM are normal. Right eye exhibits no discharge. Left eye exhibits no discharge. Scleral icterus is present.   Neck: Normal range of motion. Neck supple. No tracheal deviation present. No thyromegaly present.   Cardiovascular: Normal rate and regular rhythm.   No murmur  heard.  Pulmonary/Chest: Effort normal and breath sounds normal. No respiratory distress. He has no wheezes.   Abdominal: Soft. Bowel sounds are normal. He exhibits distension. There is tenderness (over paracentesis site). There is no guarding.   Musculoskeletal: Normal range of motion. He exhibits edema. He exhibits no tenderness or deformity.   Neurological: He is alert and oriented to person, place, and time. He displays normal reflexes. No cranial nerve deficit. Coordination normal.   Skin: Skin is warm and dry. No rash noted. No erythema.       Significant Labs:  CBC:   Recent Labs   Lab 12/03/19  0529   WBC 26.73*   RBC 2.19*   HGB 8.0*   HCT 24.8*   PLT 68*   *   MCH 36.5*   MCHC 32.3     CMP:   Recent Labs   Lab 12/03/19 0529 12/03/19  1155   * 118*   CALCIUM 8.5* 8.5*   ALBUMIN 3.2*  --    PROT 5.7*  --    * 129*   K 5.5* 5.4*   CO2 18* 14*   CL 91* 92*   * 113*   CREATININE 9.7* 10.0*   ALKPHOS 263*  --    ALT 26  --    AST 94*  --    BILITOT 9.9*  --         Significant Imaging:  X-Ray: Reviewed  US: Reviewed  CT: Reviewed    Assessment/Plan:     * Spontaneous bacterial peritonitis  Management per primary team    Acute renal failure with tubular necrosis  Unclear at this time etiology of LORENE, but likely from HRS (hyponatremia, low/normal Bps) vs prolonged prerenal or volume depletion after paracentesis 6L removal at prior hospital  - Renal US, no significant abnormalities seen in kidneys  - U/P Cr ratio: 0.23; urine sodium<20  - Cr is worsening. UOP decreasing - reports having bowel movements and micturition with bm - unable to quantify how much.  - Central line removed in setting of fungemia. Per primary team, trying to give patient some time without central line.    Plan:   - Continue to monitor UOP. Currently oliguric. Low threshold for RRT. Currently mentating well, can hold off on dialysis  - Recommend 120 of lasix to help with electrolyte status.   - Recommend planning to  place temporary HD catheter for RRT in am   - Strict I/Os and chart  - MAP >65  - Hb > 7 gm/dL  - Avoid nephrotoxic agents, NSAIDs, IV contrast, etc  - Will follow closely      Hyponatremia  Likely from his liver failure and volume overload. Refer to LORENE    Decompensated hepatic cirrhosis  Per primary team  Liver team evaluation for txp          Thank you for your consult. I will follow-up with patient. Please contact us if you have any additional questions.    Juno Caro MD  Nephrology  [facility]    ATTENDING PHYSICIAN ATTESTATION  I have personally verified the history and examined the patient. I thoroughly reviewed the demographic, clinical, laboratorial and imaging information available in medical records. I agree with the assessment and recommendations provided by the subspecialty resident who was under my supervision.

## 2019-12-03 NOTE — ASSESSMENT & PLAN NOTE
35 y/o M PMhx alcoholic cirrhosis c/b ascites on diuretics, morbid obesity who presented to OSH 11/20 w/ worsening abdominal pain and distension with LE edema s/p transfer to [facility] [place] for liver txp evaluation and LORENE. Patient was empirically started on ceftriaxone for SBP. Afterwards underwent paracentesis 11/21 w/ removal 6.2L (820 segs (815 corrected)) & given albumin. CT abd/pelvis w/ IV cont 11/21 revealed mild diffuse fluid or stranding about pancreas & lipase 700. Consequently diagnosed w/ pancreatitis. He subsequently developed LORENE and he was started on treatment for presumed HRS. Given worsening leukocytosis (16.5->27) ID was consulted and antibiotics broadened to meropenem. Patient transferred to [facility] [place] for liver transplant evaluation. Additionally patient now w/ fungemia (candida glabrata). ID is consulted for antibiotic recommendations. He received >7 days antibiotics w/ paracentesis 11/29 showing resolution of peritonitis (126 segs from 815). TTE unremarkable. Fungemia likely 2/2 GI translocation vs line infection.    Recommendations  C/w micafungin, plan for 14 days from negative blood cx (12/2/19) EOT 12/16  Midline removed, would proceed w/ line holiday for 2 days while awaiting repeat cx prior to placing another form of long term access   F/u repeat blood cultures  F/u ascites fluid cultures  No signs of fungal endophthalmitis per ophtho, appreciate assistance  C/w SBP prophylaxis, QTc >500, would consider alternate agent for prophylaxis such as ceftriaxone     Transplant work-up  Hepatitis B core, surface Ag, surface Ab negative  Hepatitis A IgG positive  Hepatitis C Ab negative  Varicella Zoster IgG positive  Strongyloides Ab IgG negative  CMV IgG positive  HIV negative  RPR non-reactive  T-spot negative  States has received vaccinations through grade school    Needs  Hepatitis B vaccination  Prevnar followed by Pneumovax 8 weeks later  Shingrix  Tdap    Of note patient states developed GBS during his  teens. Discussed w/ patient and father at bedside that receipt of vaccines have been temporally associated with GBS including combined diphtheria, pertussis and tetanus, rubella, tetanus toxoid, hepatitis B, hepatitis A, influenza, and meningococcal conjugate vaccines, however, in most cases, the association of vaccination with subsequent GBS is temporal only. Additionally, patient states he has received hepatitis B, and Tetanus in the past without incident. Informed him that hepatitis B, pneumococcal, shingrix, and Tdap vaccines were recommended prior to transplantation as he will require immunosuppressive therapy should he receive a transplant and that these measures were to decrease risk of morbidity and mortality following transplant. Patient is agreeable to receive vaccinations. Patient is currently very ill and there is no urgency to administer the above vaccinations.    We will sign off. Should his repeat blood cx or his ascites fluid culture result as positive please call us back.

## 2019-12-03 NOTE — PLAN OF CARE
POC discussed : Pt requested not to be disturbed so he could get a few hours asleep : Assisted x 1 to bathroom , generalized weakness : bowel movement x2 : refused enema , after Paracentesis :fall prevention , safety rounds hourly : VSS, will cont to monitor.

## 2019-12-03 NOTE — PLAN OF CARE
PATIENT RETURNED FROM IR HAVING HAD A PARACENTESIS. REMOVED FROM PATIENT 6.9 LITERS. PATIENT AWAKE AND ALERT, BUT IN PAIN. ABDOMEN STILL DISTENDED. ORDERED PRN AT THIS TIME FOR PAIN FROM MD. BED LOCKED AND IN LOW POSITION. CALL LIGHT WITHIN REACH.

## 2019-12-03 NOTE — SUBJECTIVE & OBJECTIVE
Interval History: Continues to have worsening kidney function and now with hyperkalemia at 5.5. In concern with fungemia, central line removed. Also went for IR paracenteisis with 6L removed.     Review of patient's allergies indicates:  No Known Allergies  Current Facility-Administered Medications   Medication Frequency    acetaminophen tablet 325 mg Q4H PRN    bisacodyl suppository 10 mg Daily PRN    ciprofloxacin HCl tablet 250 mg Daily    dextrose 10% (D10W) Bolus PRN    dextrose 10% (D10W) Bolus PRN    folic acid tablet 1 mg Daily    glucagon (human recombinant) injection 1 mg PRN    glucose chewable tablet 16 g PRN    glucose chewable tablet 24 g PRN    glycerin 99.5% topical solution 100 mL Once PRN    And    magnesium citrate solution 296 mL Once PRN    And    sodium chloride 0.9% bolus 500 mL Once PRN    micafungin 100 mg in sodium chloride 0.9 % 100 mL IVPB (ready to mix system) Q24H    midodrine tablet 15 mg TID    ondansetron tablet 8 mg Q6H PRN    oxyCODONE immediate release tablet 5 mg Q6H PRN    polyethylene glycol packet 17 g Daily    promethazine (PHENERGAN) 12.5 mg in dextrose 5 % 50 mL IVPB Q6H PRN    senna-docusate 8.6-50 mg per tablet 1 tablet BID    sevelamer carbonate tablet 1,600 mg TID WM    sodium chloride 0.9% flush 10 mL PRN    thiamine tablet 100 mg Daily    traMADol tablet 50 mg Q6H PRN       Objective:     Vital Signs (Most Recent):  Temp: 97.6 °F (36.4 °C) (12/03/19 1222)  Pulse: 88 (12/03/19 1222)  Resp: 18 (12/03/19 1222)  BP: (!) 111/57 (12/03/19 1222)  SpO2: 95 % (12/03/19 1222)  O2 Device (Oxygen Therapy): room air (12/02/19 2000) Vital Signs (24h Range):  Temp:  [96.6 °F (35.9 °C)-98.1 °F (36.7 °C)] 97.6 °F (36.4 °C)  Pulse:  [82-91] 88  Resp:  [16-20] 18  SpO2:  [94 %-100 %] 95 %  BP: (106-139)/(53-75) 111/57     Weight: 129.3 kg (285 lb) (11/29/19 1300)  Body mass index is 36.59 kg/m².  Body surface area is 2.6 meters squared.    I/O last 3 completed  shifts:  In: 1085 [P.O.:1085]  Out: 275 [Urine:275]    Physical Exam   Constitutional: He is oriented to person, place, and time. He appears well-developed and well-nourished.   HENT:   Head: Normocephalic and atraumatic.   Nose: Nose normal.   Mouth/Throat: No oropharyngeal exudate.   Eyes: Pupils are equal, round, and reactive to light. Conjunctivae and EOM are normal. Right eye exhibits no discharge. Left eye exhibits no discharge. Scleral icterus is present.   Neck: Normal range of motion. Neck supple. No tracheal deviation present. No thyromegaly present.   Cardiovascular: Normal rate and regular rhythm.   No murmur heard.  Pulmonary/Chest: Effort normal and breath sounds normal. No respiratory distress. He has no wheezes.   Abdominal: Soft. Bowel sounds are normal. He exhibits distension. There is tenderness (over paracentesis site). There is no guarding.   Musculoskeletal: Normal range of motion. He exhibits edema. He exhibits no tenderness or deformity.   Neurological: He is alert and oriented to person, place, and time. He displays normal reflexes. No cranial nerve deficit. Coordination normal.   Skin: Skin is warm and dry. No rash noted. No erythema.       Significant Labs:  CBC:   Recent Labs   Lab 12/03/19  0529   WBC 26.73*   RBC 2.19*   HGB 8.0*   HCT 24.8*   PLT 68*   *   MCH 36.5*   MCHC 32.3     CMP:   Recent Labs   Lab 12/03/19  0529 12/03/19  1155   * 118*   CALCIUM 8.5* 8.5*   ALBUMIN 3.2*  --    PROT 5.7*  --    * 129*   K 5.5* 5.4*   CO2 18* 14*   CL 91* 92*   * 113*   CREATININE 9.7* 10.0*   ALKPHOS 263*  --    ALT 26  --    AST 94*  --    BILITOT 9.9*  --         Significant Imaging:  X-Ray: Reviewed  US: Reviewed  CT: Reviewed

## 2019-12-04 PROBLEM — E44.0 MODERATE MALNUTRITION: Status: ACTIVE | Noted: 2019-01-01

## 2019-12-04 NOTE — NURSING
PRE EDUCATION TEACHING NOTE    Luis Hurd was seen in the hospital today.  Handbook on pre-liver transplant information (see outline below) was given to the patient and time was allowed for questions.  Patient's parents were at his bedside.   Informed consent signed by his mother due to patient's critical status and written information given on selection criteria.      LIVER TRANSPLANT WORK-UP EDUCATION  I. NATIONAL REGISTRY LISTING  A. Information for listing  B. Regions  C. Per UNOS, can be listed at more than one center  II. SURGERY  A. Length  B. Complications: bleeding, infection  C. Central lines, drains, Myers catheter, incision, endotracheal tube, NG tube  D. Transfusions, cell saver  III. SHORT TERM RECOVERY  A. ICU, PICU, Hospital stay  IV. LONG TERM RECOVERY  A. Labs at home  B. Clinic visits  C. Complications: infection, rejection, readmissions  D. Normal immunity and immunosuppression  E. Incidence of re-admit in 1st year  V. REJECTION  A. Incidence  B. Treatment: Solumedrol, Prograf, Thymoglobulin (actions and side effects)  VI. IMMUNOSUPPRESSIVES  A. Prednisone  B. Imuran/Cellcept  C. Cyclosporin/Prograf  D. Rapamune  E. Need for lifetime compliance  F. Actions and side effects  G. Costs  VII. RECURRENCE OF VIRAL HEPATITIS

## 2019-12-04 NOTE — SUBJECTIVE & OBJECTIVE
Interval History: 6.9 L of ascites removed via paracentesis, morning labs revealed hb of 6.9 with worsening of kidney function.    Review of patient's allergies indicates:  No Known Allergies  Current Facility-Administered Medications   Medication Frequency    0.9%  NaCl infusion (for blood administration) Q24H PRN    acetaminophen tablet 325 mg Q4H PRN    bisacodyl suppository 10 mg Daily PRN    calcium carbonate 200 mg calcium (500 mg) chewable tablet 500 mg TID PRN    ciprofloxacin HCl tablet 250 mg Daily    dextrose 10% (D10W) Bolus PRN    dextrose 10% (D10W) Bolus PRN    folic acid tablet 1 mg Daily    glucagon (human recombinant) injection 1 mg PRN    glucose chewable tablet 16 g PRN    glucose chewable tablet 24 g PRN    glycerin 99.5% topical solution 100 mL Once PRN    And    magnesium citrate solution 296 mL Once PRN    And    sodium chloride 0.9% bolus 500 mL Once PRN    hepatitis B (HEPLISAV-B) 20 mcg/0.5 mL vaccine 0.5 mL vaccine x 1 dose    micafungin 100 mg in sodium chloride 0.9 % 100 mL IVPB (ready to mix system) Q24H    midodrine tablet 15 mg TID    ondansetron tablet 8 mg Q6H PRN    oxyCODONE immediate release tablet 5 mg Q6H PRN    pneumoc 13-uche conj-dip cr(PF) (PREVNAR 13 (PF)) 0.5 mL Once    polyethylene glycol packet 17 g Daily    promethazine (PHENERGAN) 12.5 mg in dextrose 5 % 50 mL IVPB Q6H PRN    senna-docusate 8.6-50 mg per tablet 1 tablet BID    sevelamer carbonate tablet 1,600 mg TID WM    sodium chloride 0.9% flush 10 mL PRN    Tdap vaccine injection 0.5 mL Once    thiamine tablet 100 mg Daily    traMADol tablet 50 mg Q6H PRN       Objective:     Vital Signs (Most Recent):  Temp: 97.1 °F (36.2 °C) (12/04/19 0835)  Pulse: 88 (12/04/19 0835)  Resp: 17 (12/04/19 0835)  BP: (!) 104/49 (12/04/19 0835)  SpO2: (!) 93 % (12/04/19 0835)  O2 Device (Oxygen Therapy): room air (12/03/19 2000) Vital Signs (24h Range):  Temp:  [97.1 °F (36.2 °C)-97.6 °F (36.4 °C)] 97.1  °F (36.2 °C)  Pulse:  [81-91] 88  Resp:  [17-20] 17  SpO2:  [93 %-100 %] 93 %  BP: ()/(48-62) 104/49     Weight: 129.3 kg (285 lb) (11/29/19 1300)  Body mass index is 36.59 kg/m².  Body surface area is 2.6 meters squared.    I/O last 3 completed shifts:  In: 375 [P.O.:375]  Out: 200 [Urine:200]    Physical Exam   Constitutional: He is oriented to person, place, and time. He appears well-developed and well-nourished. No distress.   HENT:   Head: Normocephalic and atraumatic.   Nose: Nose normal.   Mouth/Throat: Oropharynx is clear and moist. No oropharyngeal exudate.   Eyes: Pupils are equal, round, and reactive to light. EOM are normal. Scleral icterus is present.   Neck: Normal range of motion. Neck supple. No JVD present.   Cardiovascular: Normal rate and regular rhythm.   No murmur heard.  Pulmonary/Chest: Effort normal and breath sounds normal. He has no wheezes.   Abdominal: Soft. Bowel sounds are normal. He exhibits distension. There is tenderness.   Musculoskeletal: Normal range of motion. He exhibits no edema, tenderness or deformity.   Lymphadenopathy:     He has no cervical adenopathy.   Neurological: He is alert and oriented to person, place, and time. He displays normal reflexes. No cranial nerve deficit or sensory deficit. He exhibits normal muscle tone. Coordination normal.   Alert and oriented x 4, however did have a moment where he answered 2019 to questioning of the the current president. Is able to add and subtract from 100 with a little time. Spelled world backwards correctly.    Skin: He is not diaphoretic.       Significant Labs:  CBC:   Recent Labs   Lab 12/04/19 0412   WBC 22.00*   RBC 1.93*   HGB 6.9*   HCT 21.6*   PLT 69*   *   MCH 35.8*   MCHC 31.9*     CMP:   Recent Labs   Lab 12/04/19 0412   *   CALCIUM 8.0*   ALBUMIN 3.0*   PROT 5.4*   *   K 5.4*   CO2 18*   CL 92*   *   CREATININE 10.5*   ALKPHOS 253*   ALT 22   AST 81*   BILITOT 9.1*     All labs  within the past 24 hours have been reviewed.     Significant Imaging:  Labs: Reviewed  imaging reviewed

## 2019-12-04 NOTE — ASSESSMENT & PLAN NOTE
Unclear at this time etiology of LORENE, but likely from HRS (hyponatremia, low/normal Bps) vs prolonged prerenal or volume depletion after paracentesis 6L removal at prior hospital  - Renal US, no significant abnormalities seen in kidneys  - U/P Cr ratio: 0.23; urine sodium<20  - Cr is worsening. UOP decreasing - reports having bowel movements and micturition with bm - unable to quantify how much.  - Central line removed in setting of fungemia. Per primary team and ID, trying to give patient some time without central line; requesting earliest line placement 12/6  - 12/4 developed hyperkalemia to 5.4    Plan:   - Continue to monitor UOP. Currently oliguric. Low threshold for RRT. Currently mentating well, can hold off on dialysis  - Recommend 120 of lasix to help with electrolyte status.   - Strict I/Os and chart  - MAP >65  - Hb > 7 gm/dL  - Avoid nephrotoxic agents, NSAIDs, IV contrast, etc  - Will follow closely

## 2019-12-04 NOTE — PROGRESS NOTES
Progress Note  Hospital Medicine  [facility]         Patient Name: Luis Hrud  MRN:  [id number]  Hospital Medicine Team: [facility] [place] [facility]P MED L Ender Cervantes MD  Date of Admission:  11/25/2019     Length of Stay:  LOS: 8 days   Expected Discharge Date: 12/9/2019  Principal Problem:  Spontaneous bacterial peritonitis     Subjective:     Interval History/Overnight Events:    Doing well today,Mental status at baseline. No resp distress.  No fevers or chills. VSS  On micafungin for candida glabrata candademia. Went for paracentesis yesterday with 6.9 L removed and negative for SBP; Cr and BUN continue to rise, however no uremic signs;   - ID states they would like a two day line holiday, so midline removed and trying to hold off on doing HD for two days; ID states 14 days of micafungin from 12/2, state cleared for transplant; plan for presentation tomorrow;      ciprofloxacin HCl  250 mg Oral Daily    folic acid  1 mg Oral Daily    micafungin (MYCAMINE) IVPB  100 mg Intravenous Q24H    midodrine  15 mg Oral TID    polyethylene glycol  17 g Oral Daily    senna-docusate 8.6-50 mg  1 tablet Oral BID    sevelamer carbonate  1,600 mg Oral TID WM    thiamine  100 mg Oral Daily           acetaminophen, bisacodyl, calcium carbonate, Dextrose 10% Bolus, Dextrose 10% Bolus, glucagon (human recombinant), glucose, glucose, glycerin 99.5% **AND** magnesium citrate **AND** sodium chloride 0.9%, hepatitis B, ondansetron, oxyCODONE, pneumoc 13-uche conj-dip cr(PF), promethazine (PHENERGAN) IVPB, sodium chloride 0.9%, DIPH,PERTUS (ADACEL),TETANUS PF VAC (ADULT), traMADol    Review of Systems   Constitutional: Negative for chills, fatigue, fever.   HENT: Negative for sore throat, trouble swallowing.    Eyes: Negative for photophobia, visual disturbance.   Respiratory: Negative for cough, shortness of breath.    Cardiovascular: Negative for chest pain, palpitations, leg swelling.   Gastrointestinal:  Negative for abdominal pain, constipation, diarrhea, nausea, vomiting.   Endocrine: Negative for cold intolerance, heat intolerance.   Genitourinary: Negative for dysuria, frequency.   Musculoskeletal: Negative for arthralgias, myalgias.   Skin: Negative for rash, wound, erythema   Neurological: Negative for dizziness, syncope, weakness, light-headedness.   Psychiatric/Behavioral: Negative for confusion, hallucinations, anxiety  All other systems reviewed and are negative.    Objective:     Temp:  [97.4 °F (36.3 °C)-98 °F (36.7 °C)]   Pulse:  [81-91]   Resp:  [16-20]   BP: ()/(48-75)   SpO2:  [95 %-100 %]       Physical Exam:  Constitutional: appears older than stated age, chronically ill looking,  non-distressed, not diaphoretic.   HENT: NC/AT, external ears normal, oropharynx clear, MMM w/o exudates.   Eyes: PERRL, EOMI, conjunctiva normal, no discharge b/l, +scleral icterus   Neck: normal ROM, supple  CV: RRR, no m/r/g, no carotid bruits, +2 peripheral pulses.  Pulmonary/Chest wall: Breathing comfortably w/o distress, CTAB, no w/r/r, no crackles.    GI: Soft, non-tender, (+) BS, (+) BM   +distended,  Caput medusa present,  Obese abdomen  Musculoskeletal: Normal ROM, no atrophy,  +trace pitting edema in LE bilaterally   Neurological: AAO x 4, CN II-XI in tact, nl sensation, nl strength/tone  No asterixis   Skin: warm, dry   +pallor, jaundice, + spider angiomas, +bruising   Psych: normal mood and affect, normal behavior, thought content and judgement.    Labs:    Chemistries:   Recent Labs   Lab 12/01/19  0415 12/02/19  0331 12/03/19  0529 12/03/19  1155 12/03/19  1616   * 129* 128* 129* 128*   K 4.4 5.1 5.5* 5.4* 5.1   CL 92* 92* 91* 92* 92*   CO2 19* 16* 18* 14* 18*   BUN 99* 108* 118* 113* 115*   CREATININE 7.1* 8.3* 9.7* 10.0* 9.9*   CALCIUM 8.7 8.6* 8.5* 8.5* 8.1*   PROT 5.4* 5.9* 5.7*  --   --    BILITOT 7.8* 8.6* 9.9*  --   --    ALKPHOS 184* 212* 263*  --   --    ALT 26 26 26  --   --    AST 95*  88* 94*  --   --    MG 3.1* 3.2* 3.4*  --   --    PHOS 6.5* 6.8* 7.8*  --   --         WBC:   Recent Labs   Lab 11/29/19  1635 11/30/19  0553 12/01/19  0415 12/02/19  0331 12/03/19  0529   WBC 34.15* 25.78* 23.73* 27.47* 26.73*     Bands:     CBC/Anemia Labs: Coags:    Recent Labs   Lab 12/01/19  0415 12/02/19  0331 12/03/19  0529   WBC 23.73* 27.47* 26.73*   HGB 7.6* 7.9* 8.0*   HCT 23.5* 25.0* 24.8*   PLT 71* 68* 68*   * 114* 113*   RDW 14.4 15.2* 15.8*    Recent Labs   Lab 12/01/19  0415 12/02/19  0331 12/03/19  0529   INR 1.3* 1.3* 1.4*        Diagnostic Results:       Ref. Range 11/26/2019 07:45 11/29/2019 15:56   Fluid Color Unknown Yellow Yellow   Fluid Appearance Unknown Cloudy Cloudy   Body Fluid Type Unknown Ascites Ascites   WBC, Body Fluid Latest Units: /cu mm 855 468   Segs, Fluid Latest Units: % 49 27   Lymphs, Fluid Latest Units: % 13 36   Monocytes/Macrophages, Fluid Latest Units: % 37 34   Eos, Fluid Latest Units: %  2     Blood culture [[id number]] (Abnormal) Collected: 11/29/19 1143   Order Status: Completed Specimen: Blood Updated: 12/01/19 1206    Blood Culture, Routine Gram stain teresa bottle: yeast      Positive results previously called 12/01/2019  00:11     RITA GLABRATA   ID consult required at [facility] [facility].   For susceptibility see order #[id number]   Abnormal    Narrative:     Collection has been rescheduled by VHT at 11/29/2019 11:02 Reason:   patient in bathroom.   Collection has been rescheduled by VHT at 11/29/2019 11:19 Reason:   Unable to collect  Collection has been rescheduled by VHT at 11/29/2019 11:02 Reason:   patient in bathroom.   Collection has been rescheduled by VHT at 11/29/2019 11:19 Reason:   Unable to collect         Assessment and Plan     Hospital Course:    Mr. Luis Hurd was admitted to Hospital Medicine for management of SBP , ARF, and  Decompensated cirrhosis/ liver failure     Active Hospital Problems    Diagnosis  POA     *Spontaneous bacterial peritonitis [K65.2]  Yes    Infection due to Candida glabrata [B37.9]  Yes    Candidemia [B37.7]  Yes    Acute liver failure without hepatic coma [K72.00]  Yes    Hepatorenal syndrome [K76.7]  Yes    Decompensated hepatic cirrhosis [K72.90]  Yes    Alcoholic cirrhosis of liver with ascites [K70.31]  Yes    Coagulopathy [D68.9]  Yes    Severe sepsis with acute organ dysfunction [A41.9, R65.20]  Yes    Anemia of chronic disease [D63.8]  Yes    Hyponatremia [E87.1]  Yes    Alcoholic hepatitis with ascites [K70.11]  Yes    Severe alcohol dependence [F10.20]  Yes    Acute pancreatitis [K85.90]  Yes    Acute renal failure with tubular necrosis [N17.0]  Yes    Macrocytic anemia [D53.9]  Yes    Severe protein-calorie malnutrition [E43]  Yes    Encounter for pre-transplant evaluation for liver transplant [Z01.818]  Not Applicable      Resolved Hospital Problems   No resolved problems to display.       Severe sepsis with organ dysfunction  SBP  - patient diagnosed on 11/21 and started on rocephin then switched to meropenem at OSH  - on 11/25, would be Day 5 of treatment, Zosyn  Started on presentation at [facility] on 11/25/2019   -  Patient underwent 4L paracentesis 11/26/2019, fluid analysis continues to show evidence of SBP with 850  WBCs in 49% segmented neutrophils.  WBC count in the periphery decreasing to 26 from 35.   -  Follow-up blood cultures- NGTD  -  Continue albumin for SBP treatment  -  MRCP on 11/26/2019 showed no pancreatic abscess,  Hepatic biliary obstruction. showing 1.4 cm pancreatic tail cyst  - will need repeat LVP/ diagnostic paracentesis on 11/29/2019 to ensure clearance of SBP  - p.o. fluconazole added on 11/28, given concern for worsening infection , given decompensating kidneys  - repeating diagnostic and therapeutic paracentesis on 11/29/2019 to ensure resolution of SBP- showed decrease in WBC count and resolution of SBP  - s/p  5 days of Zosyn and 5 days  "of fluconazole  - renally dosed PO cipro ppx for sbp going forward     Candidemia due to Candida Glabrata  - Blood cx from 11/29 with 4/4 bottles with candida glabrata, resulted on 12/1.  Patient has been on empiric PO fluconazole therapy for possible fungal SBP, fluc stopped.   - source likely intra abdominal   - IV micafungin started on 12/1.   ID consulted.   - already had a TTE on 11/29 which showed no fungal mass  -ophtho consulted- benign eye exam   - ID states 14 days of micafungin from 12/2     Acute liver failure without hepatic coma  Decompensated alcohol cirrhosis with ascites  Alcohol hepatitis with ascites  Severe alcohol dependence   MELD-Na score: 34 at 12/3/2019  4:16 PM  MELD score: 32 at 12/3/2019  4:16 PM  Calculated from:  Serum Creatinine: 9.9 mg/dL (Rounded to 4 mg/dL) at 12/3/2019  4:16 PM  Serum Sodium: 128 mmol/L at 12/3/2019  4:16 PM  Total Bilirubin: 9.9 mg/dL at 12/3/2019  5:29 AM  INR(ratio): 1.4 at 12/3/2019  5:29 AM  Age: 34 years     - patient's last drink about a month ago  Prior to presentation  - viral studies OSH largely negative  - thiamine, folate   - hepatology consulted  - addiction psych consulted   - PETH pending; urine tox + for opioids   - U/S liver with doppler shows "Findings consistent with cirrhosis and sequela of portal hypertension including a large volume of ascites, splenomegaly, and collateral vessel formation"   -  Treat SBP as above  -  Treat renal dysfunction as below  - echo wnl  - obtained financial approval from insurance company for initiation of liver transplant eval- undergoing liver transplant evaluation  - plan for presentation tomorrow      LORENE  Hepatorenal Syndrome  - was being treated for HRS at OSH with rise in Cr with SBP. atient also had IV contrast and had large volume paracentesis and does not appear to have gotten albumin after.  Patient also with recent pancreatitis at the outside hospital, which may be contributing to renal dysfunction  -  " Creatinine 3.1 on presentation at [facility]  - urine studies- review concerning for H RS  - cont albumin and octreotide   - nephrology consulted  - midodrine started on 11/27, in attempt to hyper perfuse kidneys   - treat SBP and fungemia as above  - monitor urine output closely.  Nephrology is following.  Patient may require dialysis soon, currently no indications for RT at this time. Cr up to 7 on 12/1  - 12/3- no need for RRT today      Acute pancreatitis - resolved  - due to alcohol   - lipase improved from OSH;  Lipase here 540-->453  - CLD--> advance diet on 11/26  - MRCP 11/26- showed 1.4 cm lesion in the tail of the pancreas as described above.  Considerations include a pancreatic cyst, small pancreatic pseudocysts, or cystic neoplasm. No acute pancreatitis      Coagulopathy due to liver disease  -  Likely due to cirrhosis/  Current decompensation, liver failure  -  fibrinogen 262  - vitamin K for 3 days .  INR trended down to 1.4 with IV vitamin K     Hyponatremia  - fluid restrict . Na 132     Macrocytic anemia  Anemia of chronic disease  - hemoglobin 11-10, . Likely due to alcoholism and liver disease  -  Hemolysis labs reviewed, show some evidence of hemolysis  -  iron studies, ferritin and folate-  Showed evidence of anemia of chronic disease  -  Continue folate     Obesity   Severe protein-calorie malnutrition  Body mass index is 36.66 kg/m²  On admission --> Body mass index is 36.59 kg/m².  -  Albumin 2.4, pre-albumin 4. Likely due to alcoholism/  Cirrhosis,  In setting of acute decompensation  -  Advancing diet as tolerated  -  Boost  -  Dietary consult    Diet:  Low Na with fluid restriction   GI PPx:  PO PPI  DVT PPx:  SCDs due to coagulopathy   Goals of Care:  Full Patient has an Advance directive which was discussed with him and his parents .  Patient has has made wishes of withdrawal of care in the case of end-stage futile measures, if that were ever case.  He is interested in all  aggressive measures, full code, transplantation, at this time    High Risk Conditions:  SUGEY, SBP    Disposition:    TBD.  Undergoing liver transplant evaluation.    If patient is approved for liver transplant, may require hospitalization until his transplantation.    Given patient's decompensated status, he is low threshold for ICU admission

## 2019-12-04 NOTE — ASSESSMENT & PLAN NOTE
Patient is a 34-year-old male with history of alcohol abuse in new diagnosis of alcoholic cirrhosis who presented with SBP and pancreatitis, worsening despite treatment with IV antibiotics.  Repeat infx workup notable for fungemia currently on treatment and undergoing transplant evaluation. Renal failure progressively worsening since admission and will be starting HD.    Plan  - SBP. 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 On micafungin. Negative optho examination. Negative echo. To complete 2 week of therapy on 12/17.  -LORENE, likely HRS. Worsening, will start HD.  -varices screening:  No EGD in the past  -hepatocellular carcinoma screening:  CTAP and RUQ neg. AFP WNL  - encouraged PT and nutrition  -transplant candidacy: presentation today

## 2019-12-04 NOTE — ASSESSMENT & PLAN NOTE
Moderate protein-calorie malnutrition   Malnutrition in the context of Acute Illness/Injury     Related to (etiology):  ETOH cirrhosis with ascites     Signs and Symptoms (as evidenced by):  Energy Intake: <50% of estimated energy requirement for >5 days  Body Fat Depletion: mild depletion of orbitals, triceps and thoracic and lumbar region   Muscle Mass Depletion: mild/moderate depletion of temples, clavicle region, scapular region, interosseous muscle and lower extremities   Fluid Accumulation: moderate to abdomen     Interventions/Recommendations (treatment strategy):  Collaboration of nutrition care with other providers  Referral of care  Modified diet-renal (predialysis)  Supplemental beverage-Boost Breeze  Vitamin-MVI  Diet education-Low Na     Nutrition Diagnosis Status:  Continues

## 2019-12-04 NOTE — PHYSICIAN QUERY
PT Name: Luis Hurd  MR #: [id number]    Physician Query Form - Cause and Effect Relationship Clarification      CDS/: Chito CUNNINGHAM, RN-BC  Contact information: [email or web address]    This form is a permanent document in the medical record.     Query Date: December 4, 2019    By submitting this query, we are merely seeking further clarification of documentation. Please utilize your independent clinical judgment when addressing the question(s) below.    The Medical record contains the following:  Supporting Clinical Findings   Location in record                RITA GLABRATA                                                                                                                             Blood cx from 11/29 with 4/4 bottles with candida glabrata, resulted on 12/1.  Patient has been on empiric PO fluconazole therapy for possible fungal SBP, fluc stopped.   source likely intra abdominal   IV micafungin started on 12/1.   ID consulted.            Blood culture 11/29/19        [facility] Medicine, PN, Mya Subramanian, 12/1/19     Provider, please clarify if there is any correlation between Sepsis and Candida Glabrata.           Are the conditions:    [x  ] Due to or associated with each other   [  ] Unrelated to each other   [  ] Other (Please Specify): _________________________   [  ] Clinically Undetermined

## 2019-12-04 NOTE — PLAN OF CARE
POC discussed : Pt requested not to be disturbed after 11 pm until 5 am : noted with decrease urine output : bladder scanned no residual noted : encouraged to reposition every two hours : fall prevention with bed alarm & safety hourly rounds : Liver evaluation inprogress : assisted x 1-2 with activities: VSS, will cont to monitor.

## 2019-12-04 NOTE — NURSING
Pt noted with only 100 ml urine output in 12 hours : bladder scanned no residual noted : Place call to IML .

## 2019-12-04 NOTE — PROGRESS NOTES
[facility]  Nephrology  Progress Note    Patient Name: Luis Hurd  MRN: [id number]  Admission Date: 11/25/2019  Hospital Length of Stay: 9 days  Attending Provider: Ender Cervantes MD   Primary Care Physician: William Nettles MD  Principal Problem:Spontaneous bacterial peritonitis    Subjective:     HPI: 35 y/o man with hx of alcohol abuse, alcoholic cirrhosis decompensated with ascites,  morbid obesity, no history of CKD baseline sCr 0.7 (as of 11/20/2019), admitted on 11/25/2019 as a transfer from OSH for liver txp evaluation and LORENE, concern for HRS.  Originally presented on 11/20 to OSH with worsening abdominal pain and distention, along with increased lower extremity edema, and also diagnosed with SBP and pancreatitis.  Last drink ~1 month ago (started drinking ~15 yrs ago, with 2-3 wine bottles/day).  On admit, sCr 0.7, had paracentesis with ~6L removal, administered albumin post procedure, no report of acute hypotension during previous hospital stay, yet subsequently developed rise in sCr to 1.63 on 11/22, 2.39 on 11/23, 2.44 on 11/25, and labs on arrival to C on 11/25 up to 3.1.  He was initiated in HRS treatment, receiving albumin, octreotide, and midodrine.  Ofr SBP started on ceftriaxone.      Nephrology consulted for evaluation of Lorene.      Interval History: 6.9 L of ascites removed via paracentesis, morning labs revealed hb of 6.9 with worsening of kidney function.    Review of patient's allergies indicates:  No Known Allergies  Current Facility-Administered Medications   Medication Frequency    0.9%  NaCl infusion (for blood administration) Q24H PRN    acetaminophen tablet 325 mg Q4H PRN    bisacodyl suppository 10 mg Daily PRN    calcium carbonate 200 mg calcium (500 mg) chewable tablet 500 mg TID PRN    ciprofloxacin HCl tablet 250 mg Daily    dextrose 10% (D10W) Bolus PRN    dextrose 10% (D10W) Bolus PRN    folic acid tablet 1 mg Daily    glucagon (human  recombinant) injection 1 mg PRN    glucose chewable tablet 16 g PRN    glucose chewable tablet 24 g PRN    glycerin 99.5% topical solution 100 mL Once PRN    And    magnesium citrate solution 296 mL Once PRN    And    sodium chloride 0.9% bolus 500 mL Once PRN    hepatitis B (HEPLISAV-B) 20 mcg/0.5 mL vaccine 0.5 mL vaccine x 1 dose    micafungin 100 mg in sodium chloride 0.9 % 100 mL IVPB (ready to mix system) Q24H    midodrine tablet 15 mg TID    ondansetron tablet 8 mg Q6H PRN    oxyCODONE immediate release tablet 5 mg Q6H PRN    pneumoc 13-uche conj-dip cr(PF) (PREVNAR 13 (PF)) 0.5 mL Once    polyethylene glycol packet 17 g Daily    promethazine (PHENERGAN) 12.5 mg in dextrose 5 % 50 mL IVPB Q6H PRN    senna-docusate 8.6-50 mg per tablet 1 tablet BID    sevelamer carbonate tablet 1,600 mg TID WM    sodium chloride 0.9% flush 10 mL PRN    Tdap vaccine injection 0.5 mL Once    thiamine tablet 100 mg Daily    traMADol tablet 50 mg Q6H PRN       Objective:     Vital Signs (Most Recent):  Temp: 97.1 °F (36.2 °C) (12/04/19 0835)  Pulse: 88 (12/04/19 0835)  Resp: 17 (12/04/19 0835)  BP: (!) 104/49 (12/04/19 0835)  SpO2: (!) 93 % (12/04/19 0835)  O2 Device (Oxygen Therapy): room air (12/03/19 2000) Vital Signs (24h Range):  Temp:  [97.1 °F (36.2 °C)-97.6 °F (36.4 °C)] 97.1 °F (36.2 °C)  Pulse:  [81-91] 88  Resp:  [17-20] 17  SpO2:  [93 %-100 %] 93 %  BP: ()/(48-62) 104/49     Weight: 129.3 kg (285 lb) (11/29/19 1300)  Body mass index is 36.59 kg/m².  Body surface area is 2.6 meters squared.    I/O last 3 completed shifts:  In: 375 [P.O.:375]  Out: 200 [Urine:200]    Physical Exam   Constitutional: He is oriented to person, place, and time. He appears well-developed and well-nourished. No distress.   HENT:   Head: Normocephalic and atraumatic.   Nose: Nose normal.   Mouth/Throat: Oropharynx is clear and moist. No oropharyngeal exudate.   Eyes: Pupils are equal, round, and reactive to light. EOM  are normal. Scleral icterus is present.   Neck: Normal range of motion. Neck supple. No JVD present.   Cardiovascular: Normal rate and regular rhythm.   No murmur heard.  Pulmonary/Chest: Effort normal and breath sounds normal. He has no wheezes. Fine crackles at bases of lungs bilaterally  Abdominal: Soft. Bowel sounds are normal. He exhibits distension. There is tenderness.   Musculoskeletal: Normal range of motion. He exhibits no edema, tenderness or deformity.   Lymphadenopathy:     He has no cervical adenopathy.   Neurological: He is alert and oriented to person, place, and time. He displays normal reflexes. No cranial nerve deficit or sensory deficit. He exhibits normal muscle tone. Coordination normal.   Alert and oriented x 4, however did have a moment where he answered 2019 to questioning of the the current president. Is able to add and subtract from 100 with a little time. Spelled world backwards correctly.    Skin: He is not diaphoretic.       Significant Labs:  CBC:   Recent Labs   Lab 12/04/19 0412   WBC 22.00*   RBC 1.93*   HGB 6.9*   HCT 21.6*   PLT 69*   *   MCH 35.8*   MCHC 31.9*     CMP:   Recent Labs   Lab 12/04/19 0412   *   CALCIUM 8.0*   ALBUMIN 3.0*   PROT 5.4*   *   K 5.4*   CO2 18*   CL 92*   *   CREATININE 10.5*   ALKPHOS 253*   ALT 22   AST 81*   BILITOT 9.1*     All labs within the past 24 hours have been reviewed.     Significant Imaging:  Labs: Reviewed  imaging reviewed    Assessment/Plan:     * Spontaneous bacterial peritonitis  Management per primary team    Acute renal failure with tubular necrosis  Unclear at this time etiology of LORENE, but likely from HRS (hyponatremia, low/normal Bps) vs prolonged prerenal or volume depletion after paracentesis 6L removal at prior hospital  - Renal US, no significant abnormalities seen in kidneys  - U/P Cr ratio: 0.23; urine sodium<20  - Cr is worsening. UOP decreasing - reports having bowel movements and micturition  with bm - unable to quantify how much.  - Central line removed in setting of fungemia. Per primary team and ID, trying to give patient some time without central line; requesting earliest line placement 12/6  - 12/4 developed hyperkalemia to 5.4    Plan:   - Continue to monitor UOP. Currently oliguric. Low threshold for RRT. Currently mentating well, can hold off on dialysis  - lasix 120 one dose for hyperkalemia management  -Monitor for fluid status, fine crackles in bases of lung. Continue to monitor.   - Strict I/Os and chart  - MAP >65  - Hb > 7 gm/dL  - Avoid nephrotoxic agents, NSAIDs, IV contrast, etc  - Will follow closely      Hyponatremia  Likely from his liver failure and volume overload. Refer to LORENE        Thank you for your consult. I will follow-up with patient. Please contact us if you have any additional questions.    Juno Caro MD  Nephrology  [facility]    ATTENDING PHYSICIAN ATTESTATION  I have personally verified the history and examined the patient. I thoroughly reviewed the demographic, clinical, laboratorial and imaging information available in medical records. I agree with the assessment and recommendations provided by the subspecialty resident who was under my supervision.

## 2019-12-04 NOTE — PROGRESS NOTES
"  [facility]  Adult Nutrition  Consult Note    SUMMARY     Recommendations    1. Continue current Renal diet + Beneprotein. Add Novasource ONS to aid in caloric intake.   2. RD to monitor & follow-up.    Goals: 1.) Pt to consume/tolerate >75% EEN and EPN by follow up.   Nutrition Goal Status: goal not met  Communication of RD Recs: (POC)    Reason for Assessment    Reason For Assessment: RD follow-up  Diagnosis: other (see comments)(spontaneous bacterial pertonitis)  Relevant Medical History: ETOH abuse, alcoholic cirrhosis with ascites, pacreatitis  Interdisciplinary Rounds: did not attend    General Information Comments: Unable to see patient this AM. Per RN documentation, pt consuming 25-50% of meals. Beneprotein ONS ordered to aid in caloric/protein intake. NFPE complete . Due to poor PO intake ~5 days, edema, and moderate wasting, pt meets ASPEN criteria for acute malnutrition. Low sodium diet education complete . Paracentesis complete  - 6.9L removed.  Nutrition Discharge Planning: Adequate PO intake, dietary compliance    Nutrition/Diet History    Spiritual, Cultural Beliefs, Mandaen Practices, Values that Affect Care: no  Factors Affecting Nutritional Intake: decreased appetite    Anthropometrics    Temp: 97.1 °F (36.2 °C)  Height Method: Stated  Height: 6' 2" (188 cm)  Height (inches): 74 in  Weight Method: Bed Scale  Weight: 129.3 kg (285 lb 0.9 oz)  Weight (lb): 285.06 lb  Ideal Body Weight (IBW), Male: 190 lb  % Ideal Body Weight, Male (lb): 150.03 lb  BMI (Calculated): 36.6  BMI Grade: 35 - 39.9 - obesity - grade II  Usual Body Weight (UBW), k.1 kg(over the past few years)  % Usual Body Weight: 109.88    Lab/Procedures/Meds    Pertinent Labs Reviewed: reviewed  Pertinent Labs Comments: Na 129, K 5.4, , Creat 10.5, GFR 5.7, Bili 9.1  Pertinent Medications Reviewed: reviewed  Pertinent Medications Comments: Folic acid, Thiamine    Estimated/Assessed " Needs    Weight Used For Calorie Calculations: 129.3 kg (285 lb 0.9 oz)     Energy Calorie Requirements (kcal): 2305  Energy Need Method: Oldham-St Jeor(no AF)     Protein Requirements: 129 g/d (1 g/kg)  Weight Used For Protein Calculations: 129.3 kg (285 lb 0.9 oz)     Estimated Fluid Requirement Method: RDA Method(or per MD)    Nutrition Prescription Ordered    Current Diet Order: Renal + Beneprotein    Evaluation of Received Nutrient/Fluid Intake    Comments: LBM: 12/3    Tolerance: tolerating    Nutrition Risk    Level of Risk/Frequency of Follow-up: (1x/week)     Assessment and Plan    Moderate malnutrition    Malnutrition in the context of Acute Illness/Injury     Related to (etiology):  ETOH cirrhosis with ascites     Signs and Symptoms (as evidenced by):  Energy Intake: <50% of estimated energy requirement for >5 days  Body Fat Depletion: mild depletion of orbitals, triceps and thoracic and lumbar region   Muscle Mass Depletion: mild/moderate depletion of temples, clavicle region, scapular region, interosseous muscle and lower extremities   Fluid Accumulation: moderate to abdomen     Interventions/Recommendations (treatment strategy):  Collaboration of nutrition care with other providers     Nutrition Diagnosis Status:  Continues     Monitor and Evaluation    Food and Nutrient Intake: energy intake, food and beverage intake  Food and Nutrient Adminstration: diet order  Knowledge/Beliefs/Attitudes: food and nutrition knowledge/skill  Physical Activity and Function: nutrition-related ADLs and IADLs  Anthropometric Measurements: weight, weight change, body mass index  Biochemical Data, Medical Tests and Procedures: electrolyte and renal panel, gastrointestinal profile, glucose/endocrine profile, lipid profile  Nutrition-Focused Physical Findings: overall appearance     Malnutrition Assessment    Energy Intake: moderate energy intake  Energy Intake (Malnutrition): less than or equal to 50% for greater than or  equal to 5 days   Orbital Region (Subcutaneous Fat Loss): well nourished  Upper Arm Region (Subcutaneous Fat Loss): mild depletion   Conger Region (Muscle Loss): moderate depletion  Clavicle Bone Region (Muscle Loss): mild depletion  Clavicle and Acromion Bone Region (Muscle Loss): mild depletion  Dorsal Hand (Muscle Loss): mild depletion  Anterior Thigh Region (Muscle Loss): well nourished  Posterior Calf Region (Muscle Loss): well nourished   Edema (Fluid Accumulation): 3-->moderate(abdomen)   Subcutaneous Fat Loss (Final Summary): mild protein-calorie malnutrition  Muscle Loss Evaluation (Final Summary): mild protein-calorie malnutrition  Fluid Accumulation Evaluation: moderate      Nutrition Follow-Up    RD Follow-up?: Yes

## 2019-12-04 NOTE — SUBJECTIVE & OBJECTIVE
Interval History:   - reports feeling well today without any complaints.  - notes he is waiting for physical therapy.  He is able to get up out of bed with assistance from the nurse but does not require walker.  - denies abd pain, n/v/d  - ophto exam negative  - repeat cultures negative  - pending HD line placement    Current Facility-Administered Medications   Medication    0.9%  NaCl infusion (for blood administration)    acetaminophen tablet 325 mg    bisacodyl suppository 10 mg    calcium carbonate 200 mg calcium (500 mg) chewable tablet 500 mg    ciprofloxacin HCl tablet 250 mg    dextrose 10% (D10W) Bolus    dextrose 10% (D10W) Bolus    folic acid tablet 1 mg    glucagon (human recombinant) injection 1 mg    glucose chewable tablet 16 g    glucose chewable tablet 24 g    glycerin 99.5% topical solution 100 mL    And    magnesium citrate solution 296 mL    And    sodium chloride 0.9% bolus 500 mL    hepatitis B (HEPLISAV-B) 20 mcg/0.5 mL vaccine 0.5 mL    micafungin 100 mg in sodium chloride 0.9 % 100 mL IVPB (ready to mix system)    midodrine tablet 15 mg    ondansetron tablet 8 mg    oxyCODONE immediate release tablet 5 mg    polyethylene glycol packet 17 g    promethazine (PHENERGAN) 12.5 mg in dextrose 5 % 50 mL IVPB    senna-docusate 8.6-50 mg per tablet 1 tablet    sevelamer carbonate tablet 1,600 mg    sodium chloride 0.9% flush 10 mL    thiamine tablet 100 mg    traMADol tablet 50 mg       Objective:     Vital Signs (Most Recent):  Temp: 97.1 °F (36.2 °C) (12/04/19 0835)  Pulse: 92 (12/04/19 1149)  Resp: 17 (12/04/19 0835)  BP: (!) 104/49 (12/04/19 0835)  SpO2: (!) 93 % (12/04/19 0835) Vital Signs (24h Range):  Temp:  [97.1 °F (36.2 °C)-97.6 °F (36.4 °C)] 97.1 °F (36.2 °C)  Pulse:  [81-92] 92  Resp:  [17-18] 17  SpO2:  [93 %-97 %] 93 %  BP: ()/(48-56) 104/49     Weight: 129.3 kg (285 lb 0.9 oz) (12/04/19 1000)  Body mass index is 36.6 kg/m².    Physical Exam    Constitutional: He is oriented to person, place, and time. He appears well-nourished. No distress.   Comfortable, no distress. Accompanied by family. In good spirits, talkative.   HENT:   Mouth/Throat: Oropharynx is clear and moist.   Eyes: Scleral icterus is present.   Pulmonary/Chest: Effort normal. No respiratory distress.   Abdominal: Soft. He exhibits no distension. There is no tenderness.   Soft, nondistended or tender.   Musculoskeletal: He exhibits edema. He exhibits no tenderness.   Good strength bilaterally.   Neurological: He is alert and oriented to person, place, and time.   Skin: Skin is warm. Capillary refill takes less than 2 seconds. He is not diaphoretic.   Psychiatric: He has a normal mood and affect.   Nursing note and vitals reviewed.      MELD-Na score: 35 at 12/4/2019  4:12 AM  MELD score: 33 at 12/4/2019  4:12 AM  Calculated from:  Serum Creatinine: 10.5 mg/dL (Rounded to 4 mg/dL) at 12/4/2019  4:12 AM  Serum Sodium: 129 mmol/L at 12/4/2019  4:12 AM  Total Bilirubin: 9.1 mg/dL at 12/4/2019  4:12 AM  INR(ratio): 1.5 at 12/4/2019  4:12 AM  Age: 34 years    Significant Labs:  Labs within the past month have been reviewed.    Significant Imaging:  Labs: Reviewed

## 2019-12-04 NOTE — PROGRESS NOTES
"[facility]  Hepatology  Progress Note    Patient Name: Luis Hurd  MRN: [id number]  Admission Date: 11/25/2019  Hospital Length of Stay: 9 days  Attending Provider: Ender Cervantes MD   Primary Care Physician: William Nettles MD  Principal Problem:Spontaneous bacterial peritonitis    Subjective:     Transplant status: No    HPI: Patient is a 34-year-old male with past medical history of alcohol abuse, newly diagnosed alcoholic cirrhosis with ascites, history of morbid obesity, who was admitted to outside hospital in 1 week ago for abdominal pain and lower extremity edema.  Despite initial evaluation consistent with SBP and a pancreatitis, the patient did not improve with IV ceftriaxone or pentoxifylline with worsening leukocytosis and kidney function.  He was transferred to [facility] for further evaluation.    Patient states that he drinks 2-3 bottles of wine every night after coming back from work, and has been doing that for a long time.  He stopped about a month ago as "it was not making him feel good".  Denies being to rehab, or having issues at work related to his alcohol use.  Endorses single DUI in the past.  Denies IV drug use, history of viral hepatitis, or history of liver disease. He was told that his liver enzymes were elevated in the past, but was never fully counseled to stop alcohol.  He was not on any medications prior to this admission.    On evaluation at outside hospital, the patient was hemodynamically stable on evaluation.  A paracentesis was done and was consistent with SBP, with PMNs around 1000.  Lipase was elevated at 700.  A CT scan of the abdomen with contrast on 11/21 was consistent with acute pancreatitis with possible early pseudocyst.  He also had features of cirrhosis with ascites. He was started on pentoxifylline, IV ceftriaxone.  During hospitalization his white count worsened to 27 from 10, creatinine 3.1 from normal, and worsening bilirubin.  He " was started on hepatorenal syndrome treatment, however with no improvement.  He was switched to meropenem prior to transfer.    Today the patient is feeling well, and endorses overall improvement of his abdominal pain.  Denies fever, chills, nausea, vomiting, change in bowel habits.  He is hemodynamically stable however mildly tachycardic.  Afebrile.  He underwent paracentesis, with consistent with improving SBP.  Of note ascitic fluid amylase was elevated at 2000 at outside hospital.    Interval History:   - reports feeling well today without any complaints.  - notes he is waiting for physical therapy.  He is able to get up out of bed with assistance from the nurse but does not require walker.  - denies abd pain, n/v/d  - ophto exam negative  - repeat cultures negative  - pending HD line placement    Current Facility-Administered Medications   Medication    0.9%  NaCl infusion (for blood administration)    acetaminophen tablet 325 mg    bisacodyl suppository 10 mg    calcium carbonate 200 mg calcium (500 mg) chewable tablet 500 mg    ciprofloxacin HCl tablet 250 mg    dextrose 10% (D10W) Bolus    dextrose 10% (D10W) Bolus    folic acid tablet 1 mg    glucagon (human recombinant) injection 1 mg    glucose chewable tablet 16 g    glucose chewable tablet 24 g    glycerin 99.5% topical solution 100 mL    And    magnesium citrate solution 296 mL    And    sodium chloride 0.9% bolus 500 mL    hepatitis B (HEPLISAV-B) 20 mcg/0.5 mL vaccine 0.5 mL    micafungin 100 mg in sodium chloride 0.9 % 100 mL IVPB (ready to mix system)    midodrine tablet 15 mg    ondansetron tablet 8 mg    oxyCODONE immediate release tablet 5 mg    polyethylene glycol packet 17 g    promethazine (PHENERGAN) 12.5 mg in dextrose 5 % 50 mL IVPB    senna-docusate 8.6-50 mg per tablet 1 tablet    sevelamer carbonate tablet 1,600 mg    sodium chloride 0.9% flush 10 mL    thiamine tablet 100 mg    traMADol tablet 50 mg        Objective:     Vital Signs (Most Recent):  Temp: 97.1 °F (36.2 °C) (12/04/19 0835)  Pulse: 92 (12/04/19 1149)  Resp: 17 (12/04/19 0835)  BP: (!) 104/49 (12/04/19 0835)  SpO2: (!) 93 % (12/04/19 0835) Vital Signs (24h Range):  Temp:  [97.1 °F (36.2 °C)-97.6 °F (36.4 °C)] 97.1 °F (36.2 °C)  Pulse:  [81-92] 92  Resp:  [17-18] 17  SpO2:  [93 %-97 %] 93 %  BP: ()/(48-56) 104/49     Weight: 129.3 kg (285 lb 0.9 oz) (12/04/19 1000)  Body mass index is 36.6 kg/m².    Physical Exam   Constitutional: He is oriented to person, place, and time. He appears well-nourished. No distress.   Comfortable, no distress. Accompanied by family. In good spirits, talkative.   HENT:   Mouth/Throat: Oropharynx is clear and moist.   Eyes: Scleral icterus is present.   Pulmonary/Chest: Effort normal. No respiratory distress.   Abdominal: Soft. He exhibits no distension. There is no tenderness.   Soft, nondistended or tender.   Musculoskeletal: He exhibits edema. He exhibits no tenderness.   Good strength bilaterally.   Neurological: He is alert and oriented to person, place, and time.   Skin: Skin is warm. Capillary refill takes less than 2 seconds. He is not diaphoretic.   Psychiatric: He has a normal mood and affect.   Nursing note and vitals reviewed.      MELD-Na score: 35 at 12/4/2019  4:12 AM  MELD score: 33 at 12/4/2019  4:12 AM  Calculated from:  Serum Creatinine: 10.5 mg/dL (Rounded to 4 mg/dL) at 12/4/2019  4:12 AM  Serum Sodium: 129 mmol/L at 12/4/2019  4:12 AM  Total Bilirubin: 9.1 mg/dL at 12/4/2019  4:12 AM  INR(ratio): 1.5 at 12/4/2019  4:12 AM  Age: 34 years    Significant Labs:  Labs within the past month have been reviewed.    Significant Imaging:  Labs: Reviewed    Assessment/Plan:     Decompensated hepatic cirrhosis  Patient is a 34-year-old male with history of alcohol abuse in new diagnosis of alcoholic cirrhosis who presented with SBP and pancreatitis, worsening despite treatment with IV antibiotics.  Repeat  infx workup notable for fungemia currently on treatment and undergoing transplant evaluation. Renal failure progressively worsening since admission and will be starting HD.    Plan  - SBP. 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 On micafungin. Negative optho examination. Negative echo. To complete 2 week of therapy on 12/17.  -LORENE, likely HRS. Worsening, will start HD.  -varices screening:  No EGD in the past  -hepatocellular carcinoma screening:  CTAP and RUQ neg. AFP WNL  - encouraged PT and nutrition  -transplant candidacy: presentation today          Thank you for your consult. I will follow-up with patient. Please contact us if you have any additional questions.    Keenan Apple MD  Hepatology  [facility]

## 2019-12-05 PROBLEM — E87.5 HYPERKALEMIA: Status: ACTIVE | Noted: 2019-01-01

## 2019-12-05 PROBLEM — Z76.82 LIVER TRANSPLANT CANDIDATE: Status: ACTIVE | Noted: 2019-01-01

## 2019-12-05 PROBLEM — I95.9 HYPOTENSION: Status: ACTIVE | Noted: 2019-01-01

## 2019-12-05 NOTE — PLAN OF CARE
Problem: Adult Inpatient Plan of Care  Goal: Plan of Care Review  Outcome: Ongoing, Progressing     Patient remained stable throughout the shift. Minor c/o pain treated and resolved with PRN Tramadol. Normal sinus, VSS, WCTM for any changes in condition.

## 2019-12-05 NOTE — PROGRESS NOTES
"[facility]  Hepatology  Progress Note    Patient Name: Luis Hurd  MRN: [id number]  Admission Date: 11/25/2019  Hospital Length of Stay: 10 days  Attending Provider: Eugenio Nettles MD   Primary Care Physician: William Nettles MD  Principal Problem:Spontaneous bacterial peritonitis    Subjective:     Transplant status: Pre-transplant    HPI: Patient is a 34-year-old male with past medical history of alcohol abuse, newly diagnosed alcoholic cirrhosis with ascites, history of morbid obesity, who was admitted to outside hospital in 1 week ago for abdominal pain and lower extremity edema.  Despite initial evaluation consistent with SBP and a pancreatitis, the patient did not improve with IV ceftriaxone or pentoxifylline with worsening leukocytosis and kidney function.  He was transferred to [facility] for further evaluation.    Patient states that he drinks 2-3 bottles of wine every night after coming back from work, and has been doing that for a long time.  He stopped about a month ago as "it was not making him feel good".  Denies being to rehab, or having issues at work related to his alcohol use.  Endorses single DUI in the past.  Denies IV drug use, history of viral hepatitis, or history of liver disease. He was told that his liver enzymes were elevated in the past, but was never fully counseled to stop alcohol.  He was not on any medications prior to this admission.    On evaluation at outside hospital, the patient was hemodynamically stable on evaluation.  A paracentesis was done and was consistent with SBP, with PMNs around 1000.  Lipase was elevated at 700.  A CT scan of the abdomen with contrast on 11/21 was consistent with acute pancreatitis with possible early pseudocyst.  He also had features of cirrhosis with ascites. He was started on pentoxifylline, IV ceftriaxone.  During hospitalization his white count worsened to 27 from 10, creatinine 3.1 from normal, and worsening " bilirubin.  He was started on hepatorenal syndrome treatment, however with no improvement.  He was switched to meropenem prior to transfer.    Today the patient is feeling well, and endorses overall improvement of his abdominal pain.  Denies fever, chills, nausea, vomiting, change in bowel habits.  He is hemodynamically stable however mildly tachycardic.  Afebrile.  He underwent paracentesis, with consistent with improving SBP.  Of note ascitic fluid amylase was elevated at 2000 at outside hospital.    Interval History:   - reports he is feeling very weak this morning, denies any acute focal complaints.  - morning blood pressure 80 systolic.  Pending transfer to ICU  - to start HD today, hyperkalemia 6.1, hyponatremic 128  - mild rising WBC to 24, INR stable in 0.5, bilirubin mild elevation to 9.8  - H&H stable 6.8, pending transfusion      Current Facility-Administered Medications   Medication    0.9%  NaCl infusion (for blood administration)    acetaminophen tablet 325 mg    bisacodyl suppository 10 mg    calcium carbonate 200 mg calcium (500 mg) chewable tablet 500 mg    cefTRIAXone injection 1 g    dextrose 10% (D10W) Bolus    dextrose 10% (D10W) Bolus    folic acid tablet 1 mg    glucagon (human recombinant) injection 1 mg    glucose chewable tablet 16 g    glucose chewable tablet 24 g    hepatitis B (HEPLISAV-B) 20 mcg/0.5 mL vaccine 0.5 mL    micafungin 100 mg in sodium chloride 0.9 % 100 mL IVPB (ready to mix system)    midodrine tablet 15 mg    ondansetron tablet 8 mg    oxyCODONE immediate release tablet 5 mg    polyethylene glycol packet 17 g    promethazine (PHENERGAN) 12.5 mg in dextrose 5 % 50 mL IVPB    senna-docusate 8.6-50 mg per tablet 1 tablet    sevelamer carbonate tablet 1,600 mg    sodium chloride 0.9% flush 10 mL    thiamine tablet 100 mg    traMADol tablet 50 mg       Objective:     Vital Signs (Most Recent):  Temp: 97.4 °F (36.3 °C) (12/05/19 1315)  Pulse: 84 (12/05/19  1330)  Resp: 13 (12/05/19 1330)  BP: 104/60 (12/05/19 1315)  SpO2: 98 % (12/05/19 1330) Vital Signs (24h Range):  Temp:  [96.3 °F (35.7 °C)-97.6 °F (36.4 °C)] 97.4 °F (36.3 °C)  Pulse:  [83-91] 84  Resp:  [13-20] 13  SpO2:  [89 %-98 %] 98 %  BP: ()/(37-60) 104/60     Weight: 129.3 kg (285 lb 0.9 oz) (12/04/19 1000)  Body mass index is 36.6 kg/m².    Physical Exam   Constitutional: He is oriented to person, place, and time. He appears well-nourished. No distress.   Lying in bed, weak appearing.  But no acute distress.   HENT:   Mouth/Throat: Oropharynx is clear and moist.   Eyes: Scleral icterus is present.   Pulmonary/Chest: Effort normal. No respiratory distress.   Abdominal: Soft. He exhibits no distension. There is no tenderness.   Soft, nondistended or tender.   Musculoskeletal: He exhibits edema. He exhibits no tenderness.   Neurological: He is alert and oriented to person, place, and time.   Skin: Skin is warm. Capillary refill takes less than 2 seconds. He is not diaphoretic.   Psychiatric: He has a normal mood and affect.   Nursing note and vitals reviewed.      MELD-Na score: 35 at 12/5/2019  5:12 AM  MELD score: 33 at 12/5/2019  5:12 AM  Calculated from:  Serum Creatinine: 11.8 mg/dL (Rounded to 4 mg/dL) at 12/5/2019  5:12 AM  Serum Sodium: 128 mmol/L at 12/5/2019  5:12 AM  Total Bilirubin: 9.8 mg/dL at 12/5/2019  5:12 AM  INR(ratio): 1.5 at 12/5/2019  5:12 AM  Age: 34 years    Significant Labs:  Labs within the past month have been reviewed.    Significant Imaging:  Labs: Reviewed    Assessment/Plan:     Decompensated hepatic cirrhosis  Patient is a 34-year-old male with history of alcohol abuse in new diagnosis of alcoholic cirrhosis who presented with SBP and pancreatitis, worsening despite treatment with IV antibiotics.  Repeat infx workup notable for fungemia currently on treatment and undergoing transplant evaluation. Renal failure progressively worsening since admission and will be starting  HD.  Complete transplant evaluation, approved pending insurance approval.  On 12/05 looks acutely unwell with hypotension, with concern for developing sepsis.    Plan  - given acute worsening, recommend transfer to ICU, repeat infectious workup  - SBP. Completed antibiotics, negative para 12/3.  Will repeat paracentesis  - fungemia likely 2/2 GI translocation. On micafungin. Negative optho examination. Negative echo. To complete 2 week of therapy on 12/17.  Repeat cultures negative  -LORENE, likely HRS. Worsening, will start HD.  Likely CRRT while in ICU  -varices screening:  No EGD in the past  -hepatocellular carcinoma screening:  CTAP and RUQ neg. AFP WNL  - encouraged PT and nutrition  -transplant candidacy:  Approved, pending insurance          Thank you for your consult. I will follow-up with patient. Please contact us if you have any additional questions.    Keenan Apple MD  Hepatology  [facility]

## 2019-12-05 NOTE — PT/OT/SLP EVAL
"Physical Therapy Evaluation    Patient Name:  Luis Hurd   MRN:  [id number]    Recommendations:     Discharge Recommendations:  home health PT, home with home health   Discharge Equipment Recommendations: walker, rolling, 3-in-1 commode, bath bench   Barriers to discharge: None    Assessment:     Luis Hurd is a 34 y.o. male admitted with a medical diagnosis of Spontaneous bacterial peritonitis.  He presents with the following impairments/functional limitations:  weakness, impaired endurance, impaired functional mobilty, gait instability, pain, decreased lower extremity function, impaired balance . Patient limited in participation in evaluation due to high level of abdominal pain. Per nursing, at end of session, patient will transfer to ICU for dialysis.    Rehab Prognosis: Good; patient would benefit from acute skilled PT services to address these deficits and reach maximum level of function.    Recent Surgery: * No surgery found *      Plan:     During this hospitalization, patient to be seen 4 x/week to address the identified rehab impairments via therapeutic activities, gait training, therapeutic exercises, wheelchair management/training and progress toward the following goals:    · Plan of Care Expires:  01/04/20    Subjective     Chief Complaint: pain  Patient/Family Comments/goals: "I have to go slow. You can't rush me."  Pain/Comfort:  Pain Rating 1: 8/10  Location 1: abdomen  Pain Addressed 1: Reposition, Distraction, Nurse notified  Pain Rating Post-Intervention 1: 8/10    Patients cultural, spiritual, Tenriism conflicts given the current situation:      Living Environment:  Patient lived alone prior to this illness, reports he is moving home w/ his parents and they will be able to assist him.  Prior to admission, patients level of function was independent, active and working.  Equipment used at home: none.  DME owned (not currently used): none.  Upon discharge, patient will have assistance from " parents.    Objective:     Communicated with nurse prior to session.  Patient found HOB elevated with telemetry  upon PT entry to room. Nurse was providing meds to patient, requiring extra time and encouragement w/ breaks between each pill; patient participating in part of PT session during pill taking breaks.    General Precautions: Standard, fall   Orthopedic Precautions:N/A   Braces: N/A     Exams:  · Cognitive Exam:  Patient is oriented to Person, Place, Time and Situation  · Gross Motor Coordination:  WFL  · Postural Exam:  Patient presented with the following abnormalities:    · -       Rounded shoulders  · -       Posterior pelvic tilt  · RLE ROM: WFL  · RLE Strength: grossly 3/5 or better, decreased following directions  · LLE ROM: WFL  · LLE Strength: grossly 3-/5, decreased following directions    Functional Mobility:  · Bed Mobility:     · Supine to Sit: minimum assistance  · Sit to Supine: stand by assistance;   · Transfers:     · Sit to Stand: attempted to stand from bed w/ RW w/ patient refusing d/t c/o abdominal pain and fatigue. No hip elevation or significant effort  · Patient instructed in partial stand to scoot to HOB, refusing and lying down.  · Patient scooted to HOB w/ use of side rails       Therapeutic Activities and Exercises:   patient educated on PT POC, need for OOB activity to increase endurance.  At end of session, nurse reports patient is now to go to ICU for HD. Patient's father arrived.    AM-PAC 6 CLICK MOBILITY  Total Score:11     Patient left HOB elevated with all lines intact, call button in reach and nurse present.    GOALS:   Multidisciplinary Problems     Physical Therapy Goals        Problem: Physical Therapy Goal    Goal Priority Disciplines Outcome Goal Variances Interventions   Physical Therapy Goal     PT, PT/OT Ongoing, Progressing     Description:  Goals to be met by: 19     Patient will increase functional independence with mobility by performin. Supine to  sit with Set-up [place]  2. Sit to supine with Set-up [place]  3. Sit to stand transfer with Stand-by Assistance  4. Bed to chair transfer with Stand-by Assistance using Rolling Walker or appropriate AD  5. Gait  x 150 feet with Stand-by Assistance using Rolling Walker. Or LRAD or w/o AD, as appropriate  6. Lower extremity exercise program x15 reps per with assistance as needed                      History:     Past Medical History:   Diagnosis Date    Acute pancreatitis 11/26/2019    Alcoholic cirrhosis of liver with ascites 11/26/2019    Severe alcohol dependence 11/26/2019    Spontaneous bacterial peritonitis 11/25/2019       History reviewed. No pertinent surgical history.    Time Tracking:     PT Received On: 12/05/19  PT Start Time: 0942     PT Stop Time: 1002  PT Total Time (min): 20 min     Billable Minutes: Evaluation 20      Katherine Hurtado, PT  12/05/2019

## 2019-12-05 NOTE — SUBJECTIVE & OBJECTIVE
Interval History:   - reports he is feeling very weak this morning, denies any acute focal complaints.  - morning blood pressure 80 systolic.  Pending transfer to ICU  - to start HD today, hyperkalemia 6.1, hyponatremic 128  - mild rising WBC to 24, INR stable in 0.5, bilirubin mild elevation to 9.8  - H&H stable 6.8, pending transfusion      Current Facility-Administered Medications   Medication    0.9%  NaCl infusion (for blood administration)    acetaminophen tablet 325 mg    bisacodyl suppository 10 mg    calcium carbonate 200 mg calcium (500 mg) chewable tablet 500 mg    cefTRIAXone injection 1 g    dextrose 10% (D10W) Bolus    dextrose 10% (D10W) Bolus    folic acid tablet 1 mg    glucagon (human recombinant) injection 1 mg    glucose chewable tablet 16 g    glucose chewable tablet 24 g    hepatitis B (HEPLISAV-B) 20 mcg/0.5 mL vaccine 0.5 mL    micafungin 100 mg in sodium chloride 0.9 % 100 mL IVPB (ready to mix system)    midodrine tablet 15 mg    ondansetron tablet 8 mg    oxyCODONE immediate release tablet 5 mg    polyethylene glycol packet 17 g    promethazine (PHENERGAN) 12.5 mg in dextrose 5 % 50 mL IVPB    senna-docusate 8.6-50 mg per tablet 1 tablet    sevelamer carbonate tablet 1,600 mg    sodium chloride 0.9% flush 10 mL    thiamine tablet 100 mg    traMADol tablet 50 mg       Objective:     Vital Signs (Most Recent):  Temp: 97.4 °F (36.3 °C) (12/05/19 1315)  Pulse: 84 (12/05/19 1330)  Resp: 13 (12/05/19 1330)  BP: 104/60 (12/05/19 1315)  SpO2: 98 % (12/05/19 1330) Vital Signs (24h Range):  Temp:  [96.3 °F (35.7 °C)-97.6 °F (36.4 °C)] 97.4 °F (36.3 °C)  Pulse:  [83-91] 84  Resp:  [13-20] 13  SpO2:  [89 %-98 %] 98 %  BP: ()/(37-60) 104/60     Weight: 129.3 kg (285 lb 0.9 oz) (12/04/19 1000)  Body mass index is 36.6 kg/m².    Physical Exam   Constitutional: He is oriented to person, place, and time. He appears well-nourished. No distress.   Lying in bed, weak appearing.   But no acute distress.   HENT:   Mouth/Throat: Oropharynx is clear and moist.   Eyes: Scleral icterus is present.   Pulmonary/Chest: Effort normal. No respiratory distress.   Abdominal: Soft. He exhibits no distension. There is no tenderness.   Soft, nondistended or tender.   Musculoskeletal: He exhibits edema. He exhibits no tenderness.   Neurological: He is alert and oriented to person, place, and time.   Skin: Skin is warm. Capillary refill takes less than 2 seconds. He is not diaphoretic.   Psychiatric: He has a normal mood and affect.   Nursing note and vitals reviewed.      MELD-Na score: 35 at 12/5/2019  5:12 AM  MELD score: 33 at 12/5/2019  5:12 AM  Calculated from:  Serum Creatinine: 11.8 mg/dL (Rounded to 4 mg/dL) at 12/5/2019  5:12 AM  Serum Sodium: 128 mmol/L at 12/5/2019  5:12 AM  Total Bilirubin: 9.8 mg/dL at 12/5/2019  5:12 AM  INR(ratio): 1.5 at 12/5/2019  5:12 AM  Age: 34 years    Significant Labs:  Labs within the past month have been reviewed.    Significant Imaging:  Labs: Reviewed

## 2019-12-05 NOTE — PROCEDURES
"Luis Hurd is a 34 y.o. male patient.    Temp: 97.5 °F (36.4 °C) (12/05/19 1500)  Pulse: 87 (12/05/19 1600)  Resp: 18 (12/05/19 1600)  BP: (!) 103/53 (12/05/19 1600)  SpO2: 98 % (12/05/19 1600)  Weight: 129.3 kg (285 lb 0.9 oz) (12/04/19 1000)  Height: 6' 2" (188 cm) (12/04/19 1000)       Arterial Line  Date/Time: 12/5/2019 4:03 PM  Performed by: Shannon Boudreaux MD  Authorized by: Sha Brunson MD   Consent Done: Yes  Consent: Written consent obtained.  Risks and benefits: risks, benefits and alternatives were discussed  Consent given by: patient  Patient understanding: patient states understanding of the procedure being performed  Patient consent: the patient's understanding of the procedure matches consent given  Procedure consent: procedure consent matches procedure scheduled  Site marked: the operative site was marked  Patient identity confirmed: MRN, name and verbally with patient  Time out: Immediately prior to procedure a "time out" was called to verify the correct patient, procedure, equipment, support staff and site/side marked as required.  Preparation: Patient was prepped and draped in the usual sterile fashion.  Indications: hemodynamic monitoring  Location: right radial  Anesthesia: local infiltration    Anesthesia:  Local anesthesia used: yes  Local Anesthetic: lidocaine 1% without epinephrine  Seth's test normal: yes  Needle gauge: 20  Seldinger technique: Seldinger technique used  Number of attempts: 2  Complications: No  Estimated blood loss (mL): 1  Specimens: No  Implants: No  Post-procedure: line sutured and dressing applied  Post-procedure CMS: unchanged and decreased circulation  Patient tolerance: Patient tolerated the procedure well with no immediate complications  Comments: Mild hematoma noted lateral and adjacent to arterial line placement.  Pressure dressing applied and information relayed to RN.  Good waveform obtained.  Arterial line draws back and flushes adequately.  "     Assisted by DO Pedro Flannery  12/5/2019

## 2019-12-05 NOTE — SUBJECTIVE & OBJECTIVE
Interval History: Patient seen and examined at bedside this AM, renal function worsening, potassium increasing. Expressed nausea/poor appetite. Anuric.      Review of patient's allergies indicates:  No Known Allergies  Current Facility-Administered Medications   Medication Frequency    0.9%  NaCl infusion (for blood administration) Q24H PRN    acetaminophen tablet 325 mg Q4H PRN    bisacodyl suppository 10 mg Daily PRN    calcium carbonate 200 mg calcium (500 mg) chewable tablet 500 mg TID PRN    cefTRIAXone injection 1 g Q24H    dextrose 10% (D10W) Bolus PRN    dextrose 10% (D10W) Bolus PRN    folic acid tablet 1 mg Daily    glucagon (human recombinant) injection 1 mg PRN    glucose chewable tablet 16 g PRN    glucose chewable tablet 24 g PRN    hepatitis B (HEPLISAV-B) 20 mcg/0.5 mL vaccine 0.5 mL vaccine x 1 dose    micafungin 100 mg in sodium chloride 0.9 % 100 mL IVPB (ready to mix system) Q24H    midodrine tablet 15 mg TID    ondansetron tablet 8 mg Q6H PRN    oxyCODONE immediate release tablet 5 mg Q6H PRN    polyethylene glycol packet 17 g Daily    promethazine (PHENERGAN) 12.5 mg in dextrose 5 % 50 mL IVPB Q6H PRN    senna-docusate 8.6-50 mg per tablet 1 tablet BID    sevelamer carbonate tablet 1,600 mg TID WM    sodium chloride 0.9% flush 10 mL PRN    thiamine tablet 100 mg Daily    traMADol tablet 50 mg Q6H PRN       Objective:     Vital Signs (Most Recent):  Temp: 96.3 °F (35.7 °C) (12/05/19 1034)  Pulse: 83 (12/05/19 1034)  Resp: 17 (12/05/19 1034)  BP: (!) 100/48 (12/05/19 1034)  SpO2: (!) 89 % (12/05/19 1034)  O2 Device (Oxygen Therapy): room air (12/03/19 2000) Vital Signs (24h Range):  Temp:  [96.3 °F (35.7 °C)-97.6 °F (36.4 °C)] 96.3 °F (35.7 °C)  Pulse:  [83-91] 83  Resp:  [16-20] 17  SpO2:  [89 %-97 %] 89 %  BP: ()/(37-56) 100/48     Weight: 129.3 kg (285 lb 0.9 oz) (12/04/19 1000)  Body mass index is 36.6 kg/m².  Body surface area is 2.6 meters squared.    I/O last 3  completed shifts:  In: 250 [P.O.:250]  Out: 100 [Urine:100]    Physical Exam   Constitutional: He is oriented to person, place, and time. He appears well-developed and well-nourished.   HENT:   Head: Normocephalic and atraumatic.   Cardiovascular: Normal rate and regular rhythm.   Pulmonary/Chest: Effort normal and breath sounds normal.   Abdominal: Soft. He exhibits distension.   Musculoskeletal: Normal range of motion. He exhibits no edema.   Neurological: He is alert and oriented to person, place, and time.   Vitals reviewed.      Significant Labs:  CBC:   Recent Labs   Lab 12/05/19 0512   WBC 24.87*   RBC 1.88*   HGB 6.8*   HCT 21.7*   PLT 98*   *   MCH 36.2*   MCHC 31.3*     CMP:   Recent Labs   Lab 12/05/19 0512   GLU 94   CALCIUM 8.2*   ALBUMIN 2.7*   PROT 5.2*   *   K 6.1*   CO2 14*   CL 92*   *   CREATININE 11.8*   ALKPHOS 273*   ALT 25   *   BILITOT 9.8*     All labs within the past 24 hours have been reviewed.

## 2019-12-05 NOTE — H&P
[facility]  Critical Care - Surgery  History & Physical    Patient Name: Luis Hurd  MRN: [id number]  Admission Date: 11/25/2019  Code Status: Full Code  Attending Physician: Eugenio Nettles MD   Primary Care Provider: William Nettles MD   Principal Problem: Spontaneous bacterial peritonitis    Subjective:     HPI:  Patient is a 33 yo male with pmh of etoh abuse, alcoholic cirrhosis, history of morbid obesity, who was admitted to OSH 1 week ago for abd pain and LE edema.   Initial evaluation consistent with SBP and a pancreatitis, however patient did not improve with IV ceftriaxone or pentoxifylline with worsening leukocytosis and kidney function.   Transferred to [facility] for further evaluation.  Stepped up to SICU needing central access for CRRT as well as closer BP monitoring.      Hospital/ICU Course:  Pt arrived from OSH.  Newly diagnosed alcoholic cirrhosis, pt accepted to SICU with LTS as primary.  Renal function declining.  RIJ trialysis catheter placed with plan for patient to possibly receive CRRT evening of 12/5/19.           Past Medical History:   Diagnosis Date    Acute pancreatitis 11/26/2019    Alcoholic cirrhosis of liver with ascites 11/26/2019    Severe alcohol dependence 11/26/2019    Spontaneous bacterial peritonitis 11/25/2019       History reviewed. No pertinent surgical history.    Review of patient's allergies indicates:  No Known Allergies    Family History     None        Tobacco Use    Smoking status: Never Smoker    Smokeless tobacco: Never Used   Substance and Sexual Activity    Alcohol use: Not on file    Drug use: Not on file    Sexual activity: Not on file      Review of Systems   Constitutional: Positive for fatigue. Negative for chills and fever.   HENT: Negative.    Respiratory: Positive for shortness of breath. Negative for cough and chest tightness.    Cardiovascular: Positive for leg swelling. Negative for chest pain.    Gastrointestinal: Positive for abdominal distention and abdominal pain. Negative for constipation, nausea and vomiting.   Endocrine: Negative.    Genitourinary: Negative.    Musculoskeletal: Negative.    Skin: Positive for color change.   Allergic/Immunologic: Negative.    Neurological: Negative.      Objective:     Vital Signs (Most Recent):  Temp: 97.4 °F (36.3 °C) (12/05/19 1315)  Pulse: 85 (12/05/19 1430)  Resp: 14 (12/05/19 1430)  BP: (!) 96/50 (12/05/19 1430)  SpO2: 100 % (12/05/19 1430) Vital Signs (24h Range):  Temp:  [96.3 °F (35.7 °C)-97.6 °F (36.4 °C)] 97.4 °F (36.3 °C)  Pulse:  [83-91] 85  Resp:  [13-20] 14  SpO2:  [89 %-100 %] 100 %  BP: ()/(37-60) 96/50     Weight: 129.3 kg (285 lb 0.9 oz)  Body mass index is 36.6 kg/m².      Intake/Output Summary (Last 24 hours) at 12/5/2019 1452  Last data filed at 12/5/2019 1245  Gross per 24 hour   Intake 297.5 ml   Output --   Net 297.5 ml       Physical Exam   Constitutional: He is oriented to person, place, and time. No distress.   HENT:   Head: Normocephalic.   Eyes: Pupils are equal, round, and reactive to light. Scleral icterus is present.   Neck: Neck supple.   Cardiovascular: Normal rate, regular rhythm, normal heart sounds and intact distal pulses.   Pulmonary/Chest: Effort normal and breath sounds normal. No respiratory distress.   Abdominal: He exhibits distension. There is tenderness. There is no rebound and no guarding.   Neurological: He is alert and oriented to person, place, and time.   Skin: Skin is warm and dry. He is not diaphoretic.   Psychiatric: He has a normal mood and affect. His behavior is normal.   Nursing note and vitals reviewed.      Vents:       Lines/Drains/Airways     Central Venous Catheter Line                 Trialysis (Dialysis) Catheter 12/05/19 1429 right internal jugular less than 1 day          Peripheral Intravenous Line                 Peripheral IV - Single Lumen 12/03/19 1100 18 G Anterior;Right Upper Arm 2 days                 Significant Labs:    CBC/Anemia Profile:  Recent Labs   Lab 12/04/19  0412 12/05/19  0512   WBC 22.00* 24.87*   HGB 6.9* 6.8*   HCT 21.6* 21.7*   PLT 69* 98*   * 115*   RDW 15.9* 16.8*        Chemistries:  Recent Labs   Lab 12/03/19  1616 12/04/19  0412 12/05/19  0512   * 129* 128*   K 5.1 5.4* 6.1*   CL 92* 92* 92*   CO2 18* 18* 14*   * 129* 145*   CREATININE 9.9* 10.5* 11.8*   CALCIUM 8.1* 8.0* 8.2*   ALBUMIN  --  3.0* 2.7*   PROT  --  5.4* 5.2*   BILITOT  --  9.1* 9.8*   ALKPHOS  --  253* 273*   ALT  --  22 25   AST  --  81* 100*   MG  --  3.4* 3.4*   PHOS  --  7.1* 7.2*       All pertinent labs within the past 24 hours have been reviewed.    Significant Imaging: I have reviewed all pertinent imaging results/findings within the past 24 hours.    Assessment/Plan:     Decompensated hepatic cirrhosis  35 yo male stepped up to SICU for line and hemodynamic stabilization while awaiting CRRT.  Possible transplant list candidate.      Neuro  -AOx3  -No acute issues  -Normal mentation    Cardio  -HDS at time of step up.  -Low diastolic pressures, obtaining a line and will continue to monitor.     Pulm  -Orthopnea, likely 2/2 relative fluid overload  -CRRT this evening.  O2 via NC if needed.  -Will consider CXR     FENGI  # Decompensated alcohol cirrhosis with ascites  # Alcohol hepatitis with ascites  # Severe alcohol dependence   MELD-Na score: 34 at 11/25/2019 10:07 PM  MELD score: 32 at 11/25/2019 10:07 PM  Calculated from:  Serum Creatinine: 3.1 mg/dL at 11/25/2019 10:07 PM  Serum Sodium: 131 mmol/L at 11/25/2019 10:07 PM  Total Bilirubin: 10.2 mg/dL at 11/25/2019 10:07 PM  INR(ratio): 1.7 at 11/25/2019 10:07 PM  Age: 34 years  - patient's last drink about a month ago  - hepatology consulted  - addiction psych consulted   - PETH pending; urine tox  - getting U/S liver with doppler  - will likely need inpatient liver transplant evaluation; supportive family and good insight into his  alcohol abuse  - IR paracentesis in AM, no more than 4L to be removed  - viral studies OSH largely negative  - thiamine, folate     # Acute pancreatitis   - due to alcohol   - lipase improved from OSH;   - will trial on CLD; no evidence of stones on CT OSH     # Hepatorenal Syndrome  - was being treated for HRS at OSH with rise in Cr with SBP  - patient also had IV contrast and had large volume paracentesis and does not appear to have gotten albumin after  - will start on albumin and octreotide here, BP ok   - getting urine studies and renal U/S  - nephrology consult  - strict I/O     # Coagulopathy due to liver disease  - DIC labs  - vitamin K for 3 days     ID:  # SBP  - patient diagnosed on 11/21 and started on rocephin then switched to meropenem at OSH  - today would be Day 5 of treatment, will start patient on Zosyn here and plan for IR paracentesis in the AM to check for resolution and follow up cultures      Renal     # Hyponatremia  - Continue to monitor, daily bmp  - CRRT     # Anemia of chronic disease  - iron studies, ferritin and folate     # Obesity   Body mass index is 36.66 kg/m².  - dietary     Diet:  CLD  DVT PPx:  Heparin   Goals of Care:  full      Critical secondary to Patient has a condition that poses threat to life and bodily function: Alcoholic cirrhosis, possible SBT, renal decompensation.      Critical care was time spent personally by me on the following activities: development of treatment plan with patient or surrogate and bedside caregivers, discussions with consultants, evaluation of patient's response to treatment, examination of patient, ordering and performing treatments and interventions, ordering and review of laboratory studies, ordering and review of radiographic studies, pulse oximetry, re-evaluation of patient's condition.  This critical care time did not overlap with that of any other provider or involve time for any procedures.     Pedro Jacobsen, DO  Critical Care -  Surgery  [facility]

## 2019-12-05 NOTE — NURSING
Pt transferred from floor to SICU via bed with RN and tech. Continous cardiac monitoring in place. Placed on bedside monitor and Transplant resident notified of arrival. PT AAO VSS

## 2019-12-05 NOTE — PROGRESS NOTES
Progress Note  Hospital Medicine  [facility], [place] [place]         Patient Name: Luis Hurd  MRN:  [id number]  Hospital Medicine Team: [facility] [place] [facility] Ender Cervantes MD  Date of Admission:  11/25/2019     Length of Stay:  LOS: 9 days   Expected Discharge Date: 12/9/2019  Principal Problem:  Spontaneous bacterial peritonitis     Subjective:     Interval History/Overnight Events:  Patient doing ok today, hemoglobin dropped to 6.9, transfusing 1 unit of PRBC; patient's Cr and BUN continues to increase, however no urgent need for HD, ID requesting 2 days of line holiday due to fungemia, will likely need HD tomorrow; patient has been presented for liver transplant and approved, pending listing and transfer to LTS service;      [START ON 12/5/2019] cefTRIAXone (ROCEPHIN) IVPB  1 g Intravenous Q24H    folic acid  1 mg Oral Daily    micafungin (MYCAMINE) IVPB  100 mg Intravenous Q24H    midodrine  15 mg Oral TID    polyethylene glycol  17 g Oral Daily    senna-docusate 8.6-50 mg  1 tablet Oral BID    sevelamer carbonate  1,600 mg Oral TID WM    thiamine  100 mg Oral Daily           sodium chloride, acetaminophen, bisacodyl, calcium carbonate, Dextrose 10% Bolus, Dextrose 10% Bolus, glucagon (human recombinant), glucose, glucose, glycerin 99.5% **AND** magnesium citrate **AND** sodium chloride 0.9%, hepatitis B, ondansetron, oxyCODONE, promethazine (PHENERGAN) IVPB, sodium chloride 0.9%, traMADol    Review of Systems   Constitutional: Negative for chills, fatigue, fever.   HENT: Negative for sore throat, trouble swallowing.    Eyes: Negative for photophobia, visual disturbance.   Respiratory: Negative for cough, shortness of breath.    Cardiovascular: Negative for chest pain, palpitations, leg swelling.   Gastrointestinal: Negative for abdominal pain, constipation, diarrhea, nausea, vomiting.   Endocrine: Negative for cold intolerance, heat intolerance.   Genitourinary: Negative for dysuria,  frequency.   Musculoskeletal: Negative for arthralgias, myalgias.   Skin: Negative for rash, wound, erythema   Neurological: Negative for dizziness, syncope, weakness, light-headedness.   Psychiatric/Behavioral: Negative for confusion, hallucinations, anxiety  All other systems reviewed and are negative.    Objective:     Temp:  [97.1 °F (36.2 °C)-97.5 °F (36.4 °C)]   Pulse:  [84-92]   Resp:  [16-18]   BP: (101-117)/(49-56)   SpO2:  [93 %-97 %]       Physical Exam:  Constitutional: appears older than stated age, chronically ill looking,  non-distressed, not diaphoretic.   HENT: NC/AT, external ears normal, oropharynx clear, MMM w/o exudates.   Eyes: PERRL, EOMI, conjunctiva normal, no discharge b/l, +scleral icterus   Neck: normal ROM, supple  CV: RRR, no m/r/g, no carotid bruits, +2 peripheral pulses.  Pulmonary/Chest wall: Breathing comfortably w/o distress, CTAB, no w/r/r, no crackles.    GI: Soft, non-tender, (+) BS, (+) BM   +distended,  Caput medusa present,  Obese abdomen  Musculoskeletal: Normal ROM, no atrophy,  +trace pitting edema in LE bilaterally   Neurological: AAO x 4, CN II-XI in tact, nl sensation, nl strength/tone  No asterixis   Skin: warm, dry   +pallor, jaundice, + spider angiomas, +bruising   Psych: normal mood and affect, normal behavior, thought content and judgement.    Labs:    Chemistries:   Recent Labs   Lab 12/02/19  0331 12/03/19  0529 12/03/19  1155 12/03/19  1616 12/04/19  0412   * 128* 129* 128* 129*   K 5.1 5.5* 5.4* 5.1 5.4*   CL 92* 91* 92* 92* 92*   CO2 16* 18* 14* 18* 18*   * 118* 113* 115* 129*   CREATININE 8.3* 9.7* 10.0* 9.9* 10.5*   CALCIUM 8.6* 8.5* 8.5* 8.1* 8.0*   PROT 5.9* 5.7*  --   --  5.4*   BILITOT 8.6* 9.9*  --   --  9.1*   ALKPHOS 212* 263*  --   --  253*   ALT 26 26  --   --  22   AST 88* 94*  --   --  81*   MG 3.2* 3.4*  --   --  3.4*   PHOS 6.8* 7.8*  --   --  7.1*        WBC:   Recent Labs   Lab 11/30/19  0553 12/01/19  0415 12/02/19  0331  12/03/19  0529 12/04/19  0412   WBC 25.78* 23.73* 27.47* 26.73* 22.00*     Bands:     CBC/Anemia Labs: Coags:    Recent Labs   Lab 12/02/19 0331 12/03/19  0529 12/04/19  0412   WBC 27.47* 26.73* 22.00*   HGB 7.9* 8.0* 6.9*   HCT 25.0* 24.8* 21.6*   PLT 68* 68* 69*   * 113* 112*   RDW 15.2* 15.8* 15.9*    Recent Labs   Lab 12/02/19  0331 12/03/19  0529 12/04/19  0412   INR 1.3* 1.4* 1.5*        Diagnostic Results:       Ref. Range 11/26/2019 07:45 11/29/2019 15:56   Fluid Color Unknown Yellow Yellow   Fluid Appearance Unknown Cloudy Cloudy   Body Fluid Type Unknown Ascites Ascites   WBC, Body Fluid Latest Units: /cu mm 855 468   Segs, Fluid Latest Units: % 49 27   Lymphs, Fluid Latest Units: % 13 36   Monocytes/Macrophages, Fluid Latest Units: % 37 34   Eos, Fluid Latest Units: %  2     Blood culture [[id number]] (Abnormal) Collected: 11/29/19 1143   Order Status: Completed Specimen: Blood Updated: 12/01/19 1206    Blood Culture, Routine Gram stain teresa bottle: yeast      Positive results previously called 12/01/2019  00:11     RITA GLABRATA   ID consult required at [facility], later [facility] and [facility].   For susceptibility see order #[id number]   Abnormal    Narrative:     Collection has been rescheduled by VHT at 11/29/2019 11:02 Reason:   patient in bathroom.   Collection has been rescheduled by VHT at 11/29/2019 11:19 Reason:   Unable to collect  Collection has been rescheduled by VHT at 11/29/2019 11:02 Reason:   patient in bathroom.   Collection has been rescheduled by VHT at 11/29/2019 11:19 Reason:   Unable to collect         Assessment and Plan     Hospital Course:    Mr. Luis Hurd was admitted to Hospital Medicine for management of SBP , ARF, and  Decompensated cirrhosis/ liver failure     Active Hospital Problems    Diagnosis  POA    *Spontaneous bacterial peritonitis [K65.2]  Yes    Infection due to Candida glabrata [B37.9]  Yes    Candidemia [B37.7]  Yes    Acute liver failure  without hepatic coma [K72.00]  Yes    Hepatorenal syndrome [K76.7]  Yes    Decompensated hepatic cirrhosis [K72.90]  Yes    Alcoholic cirrhosis of liver with ascites [K70.31]  Yes    Coagulopathy [D68.9]  Yes    Severe sepsis with acute organ dysfunction [A41.9, R65.20]  Yes    Anemia of chronic disease [D63.8]  Yes    Hyponatremia [E87.1]  Yes    Alcoholic hepatitis with ascites [K70.11]  Yes    Severe alcohol dependence [F10.20]  Yes    Acute pancreatitis [K85.90]  Yes    Acute renal failure with tubular necrosis [N17.0]  Yes    Macrocytic anemia [D53.9]  Yes    Moderate malnutrition [E44.0]  Unknown    Encounter for pre-transplant evaluation for liver transplant [Z01.088]  Not Applicable      Resolved Hospital Problems   No resolved problems to display.       Severe sepsis with organ dysfunction  SBP  - patient diagnosed on 11/21 and started on rocephin then switched to meropenem at OSH  - on 11/25, would be Day 5 of treatment, Zosyn  Started on presentation at [facility] on 11/25/2019   -  Patient underwent 4L paracentesis 11/26/2019, fluid analysis continues to show evidence of SBP with 850  WBCs in 49% segmented neutrophils.  WBC count in the periphery decreasing to 26 from 35.   -  Follow-up blood cultures- NGTD  -  Continue albumin for SBP treatment  -  MRCP on 11/26/2019 showed no pancreatic abscess,  Hepatic biliary obstruction. showing 1.4 cm pancreatic tail cyst  - will need repeat LVP/ diagnostic paracentesis on 11/29/2019 to ensure clearance of SBP  - p.o. fluconazole added on 11/28, given concern for worsening infection , given decompensating kidneys  - repeating diagnostic and therapeutic paracentesis on 11/29/2019 to ensure resolution of SBP- showed decrease in WBC count and resolution of SBP  - s/p  5 days of Zosyn and 5 days of fluconazole  - renally dosed PO cipro ppx for sbp going forward     Sepsis due to Candidemia due to Candida Glabrata  - Blood cx from 11/29 with 4/4 bottles with  "cindy glabrata, resulted on 12/1.  Patient has been on empiric PO fluconazole therapy for possible fungal SBP, fluc stopped.   - source likely intra abdominal   - IV micafungin started on 12/1.   ID consulted.   - already had a TTE on 11/29 which showed no fungal mass  -ophtho consulted- benign eye exam   - ID states 14 days of micafungin from 12/2     Acute liver failure without hepatic coma  Decompensated alcohol cirrhosis with ascites  Alcohol hepatitis with ascites  Severe alcohol dependence   MELD-Na score: 35 at 12/4/2019  4:12 AM  MELD score: 33 at 12/4/2019  4:12 AM  Calculated from:  Serum Creatinine: 10.5 mg/dL (Rounded to 4 mg/dL) at 12/4/2019  4:12 AM  Serum Sodium: 129 mmol/L at 12/4/2019  4:12 AM  Total Bilirubin: 9.1 mg/dL at 12/4/2019  4:12 AM  INR(ratio): 1.5 at 12/4/2019  4:12 AM  Age: 34 years     - patient's last drink about a month ago  Prior to presentation  - viral studies OSH largely negative  - thiamine, folate   - hepatology consulted  - addiction psych consulted   - PETH pending; urine tox + for opioids   - U/S liver with doppler shows "Findings consistent with cirrhosis and sequela of portal hypertension including a large volume of ascites, splenomegaly, and collateral vessel formation"   -  Treat SBP as above  -  Treat renal dysfunction as below  - echo wnl  - obtained financial approval from insurance company for initiation of liver transplant eval- undergoing liver transplant evaluation  - approved for liver transplant and awaiting listing       LORENE  Hepatorenal Syndrome  - was being treated for HRS at OSH with rise in Cr with SBP. atient also had IV contrast and had large volume paracentesis and does not appear to have gotten albumin after.  Patient also with recent pancreatitis at the outside hospital, which may be contributing to renal dysfunction  -  Creatinine 3.1 on presentation at [facility]  - urine studies- review concerning for H RS  - cont albumin and octreotide   - " nephrology consulted  - midodrine started on 11/27, in attempt to hyper perfuse kidneys   - treat SBP and fungemia as above  - monitor urine output closely.  Nephrology is following.  Patient may require dialysis soon, currently no indications for RT at this time. Cr up to 7 on 12/1  - 12/3- no need for RRT today      Acute pancreatitis - resolved  - due to alcohol   - lipase improved from OSH;  Lipase here 540-->453  - CLD--> advance diet on 11/26  - MRCP 11/26- showed 1.4 cm lesion in the tail of the pancreas as described above.  Considerations include a pancreatic cyst, small pancreatic pseudocysts, or cystic neoplasm. No acute pancreatitis      Coagulopathy due to liver disease  -  Likely due to cirrhosis/  Current decompensation, liver failure  -  fibrinogen 262  - vitamin K for 3 days .  INR trended down to 1.4 with IV vitamin K     Hyponatremia  - fluid restrict . Na 132     Macrocytic anemia  Anemia of chronic disease  - hemoglobin 11-10, . Likely due to alcoholism and liver disease  -  Hemolysis labs reviewed, show some evidence of hemolysis  -  iron studies, ferritin and folate-  Showed evidence of anemia of chronic disease  -  Continue folate     Obesity   Severe protein-calorie malnutrition  Body mass index is 36.66 kg/m²  On admission --> Body mass index is 36.6 kg/m².  -  Albumin 2.4, pre-albumin 4. Likely due to alcoholism/  Cirrhosis,  In setting of acute decompensation  -  Advancing diet as tolerated  -  Boost  -  Dietary consult    Diet:  Low Na with fluid restriction   GI PPx:  PO PPI  DVT PPx:  SCDs due to coagulopathy   Goals of Care:  Full Patient has an Advance directive which was discussed with him and his parents .  Patient has has made wishes of withdrawal of care in the case of end-stage futile measures, if that were ever case.  He is interested in all aggressive measures, full code, transplantation, at this time    High Risk Conditions:  penitentiary, SBP    Disposition:    - patient  approved for liver transplant awaiting listing

## 2019-12-05 NOTE — SUBJECTIVE & OBJECTIVE
Past Medical History:   Diagnosis Date    Acute pancreatitis 11/26/2019    Alcoholic cirrhosis of liver with ascites 11/26/2019    Severe alcohol dependence 11/26/2019    Spontaneous bacterial peritonitis 11/25/2019       History reviewed. No pertinent surgical history.    Review of patient's allergies indicates:  No Known Allergies    Family History     None        Tobacco Use    Smoking status: Never Smoker    Smokeless tobacco: Never Used   Substance and Sexual Activity    Alcohol use: Not on file    Drug use: Not on file    Sexual activity: Not on file      Review of Systems   Constitutional: Positive for fatigue. Negative for chills and fever.   HENT: Negative.    Respiratory: Positive for shortness of breath. Negative for cough and chest tightness.    Cardiovascular: Positive for leg swelling. Negative for chest pain.   Gastrointestinal: Positive for abdominal distention and abdominal pain. Negative for constipation, nausea and vomiting.   Endocrine: Negative.    Genitourinary: Negative.    Musculoskeletal: Negative.    Skin: Positive for color change.   Allergic/Immunologic: Negative.    Neurological: Negative.      Objective:     Vital Signs (Most Recent):  Temp: 97.4 °F (36.3 °C) (12/05/19 1315)  Pulse: 85 (12/05/19 1430)  Resp: 14 (12/05/19 1430)  BP: (!) 96/50 (12/05/19 1430)  SpO2: 100 % (12/05/19 1430) Vital Signs (24h Range):  Temp:  [96.3 °F (35.7 °C)-97.6 °F (36.4 °C)] 97.4 °F (36.3 °C)  Pulse:  [83-91] 85  Resp:  [13-20] 14  SpO2:  [89 %-100 %] 100 %  BP: ()/(37-60) 96/50     Weight: 129.3 kg (285 lb 0.9 oz)  Body mass index is 36.6 kg/m².      Intake/Output Summary (Last 24 hours) at 12/5/2019 1452  Last data filed at 12/5/2019 1245  Gross per 24 hour   Intake 297.5 ml   Output --   Net 297.5 ml       Physical Exam   Constitutional: He is oriented to person, place, and time. No distress.   HENT:   Head: Normocephalic.   Eyes: Pupils are equal, round, and reactive to light.  Scleral icterus is present.   Neck: Neck supple.   Cardiovascular: Normal rate, regular rhythm, normal heart sounds and intact distal pulses.   Pulmonary/Chest: Effort normal and breath sounds normal. No respiratory distress.   Abdominal: He exhibits distension. There is tenderness. There is no rebound and no guarding.   Neurological: He is alert and oriented to person, place, and time.   Skin: Skin is warm and dry. He is not diaphoretic.   Psychiatric: He has a normal mood and affect. His behavior is normal.   Nursing note and vitals reviewed.      Vents:       Lines/Drains/Airways     Central Venous Catheter Line                 Trialysis (Dialysis) Catheter 12/05/19 1429 right internal jugular less than 1 day          Peripheral Intravenous Line                 Peripheral IV - Single Lumen 12/03/19 1100 18 G Anterior;Right Upper Arm 2 days                Significant Labs:    CBC/Anemia Profile:  Recent Labs   Lab 12/04/19  0412 12/05/19  0512   WBC 22.00* 24.87*   HGB 6.9* 6.8*   HCT 21.6* 21.7*   PLT 69* 98*   * 115*   RDW 15.9* 16.8*        Chemistries:  Recent Labs   Lab 12/03/19  1616 12/04/19  0412 12/05/19  0512   * 129* 128*   K 5.1 5.4* 6.1*   CL 92* 92* 92*   CO2 18* 18* 14*   * 129* 145*   CREATININE 9.9* 10.5* 11.8*   CALCIUM 8.1* 8.0* 8.2*   ALBUMIN  --  3.0* 2.7*   PROT  --  5.4* 5.2*   BILITOT  --  9.1* 9.8*   ALKPHOS  --  253* 273*   ALT  --  22 25   AST  --  81* 100*   MG  --  3.4* 3.4*   PHOS  --  7.1* 7.2*       All pertinent labs within the past 24 hours have been reviewed.    Significant Imaging: I have reviewed all pertinent imaging results/findings within the past 24 hours.

## 2019-12-05 NOTE — HOSPITAL COURSE
Patient with pre-liver status in fulminant liver failure being managed in the SICU awaiting liver transplant listing. Over course of day, patient had increased work of breathing and septic clinical picture requiring additional imaging. Decision made to intubate at which time large volume aspiration occurred. Following this, patient severely decompensated requiring max dose pressor and inotropic support with continued hypotension and lactic acidosis. All heroic measures were pursued per the patient's advanced directive including PPI and octreotide gtt for UGI bleeding, CRRT which failed due to hemodynamic instability, and max dose bicarbonate gtt. Despite these efforts, patient continued to decompensate. As it became evident that our measures had become futile, comfort measures were initiated. Very quickly, patient's rhythm was found to be asystole with no detectable blood pressure. No heart tones auscultated on exam and pupils were fix and dilated. No spontaneous respirations or gag reflex appreciated. At 2215, patient was pronounced . Cause of death multisystem organ failure.

## 2019-12-05 NOTE — NURSING
"Dx: Spontaneous bacterial peritonitis; Worsening LORENE    Shift Events: Transferred to SICU for higher level of care. Creatinine continues to increase: plan for CRRT. Hypotension. Central access and eros obtained.     Neuro: AAO x4, Follows Commands and Moves All Extremities    Vital Signs: BP (!) 105/52   Pulse 85   Temp 97.5 °F (36.4 °C) (Oral)   Resp 16   Ht 6' 2" (1.88 m)   Wt 129.3 kg (285 lb 0.9 oz)   SpO2 99%   BMI 36.60 kg/m²     Diet: 2 g NA    Urine Output: Voids Spontaneously Oliguric. CRRT orders placed    Labs: Renal and Mg q8    Skin: intact. Generalized bruising. Turn and reposition q2h. Heels and elbows offloaded.       "

## 2019-12-05 NOTE — PROGRESS NOTES
[facility]  Nephrology  Progress Note    Patient Name: Luis Hurd  MRN: [id number]  Admission Date: 11/25/2019  Hospital Length of Stay: 10 days  Attending Provider: Eugenio Nettles MD   Primary Care Physician: William Nettles MD  Principal Problem:Spontaneous bacterial peritonitis    Subjective:     HPI: 35 y/o man with hx of alcohol abuse, alcoholic cirrhosis decompensated with ascites,  morbid obesity, no history of CKD baseline sCr 0.7 (as of 11/20/2019), admitted on 11/25/2019 as a transfer from OSH for liver txp evaluation and LORENE, concern for HRS.  Originally presented on 11/20 to OSH with worsening abdominal pain and distention, along with increased lower extremity edema, and also diagnosed with SBP and pancreatitis.  Last drink ~1 month ago (started drinking ~15 yrs ago, with 2-3 wine bottles/day).  On admit, sCr 0.7, had paracentesis with ~6L removal, administered albumin post procedure, no report of acute hypotension during previous hospital stay, yet subsequently developed rise in sCr to 1.63 on 11/22, 2.39 on 11/23, 2.44 on 11/25, and labs on arrival to [facility] on 11/25 up to 3.1.  He was initiated in HRS treatment, receiving albumin, octreotide, and midodrine.  Ofr SBP started on ceftriaxone.      Nephrology consulted for evaluation of Lorene.      Interval History: Patient seen and examined at bedside this AM, renal function worsening, potassium increasing. Expressed nausea/poor appetite. Anuric.      Review of patient's allergies indicates:  No Known Allergies  Current Facility-Administered Medications   Medication Frequency    0.9%  NaCl infusion (for blood administration) Q24H PRN    acetaminophen tablet 325 mg Q4H PRN    bisacodyl suppository 10 mg Daily PRN    calcium carbonate 200 mg calcium (500 mg) chewable tablet 500 mg TID PRN    cefTRIAXone injection 1 g Q24H    dextrose 10% (D10W) Bolus PRN    dextrose 10% (D10W) Bolus PRN    folic acid tablet 1 mg Daily     glucagon (human recombinant) injection 1 mg PRN    glucose chewable tablet 16 g PRN    glucose chewable tablet 24 g PRN    hepatitis B (HEPLISAV-B) 20 mcg/0.5 mL vaccine 0.5 mL vaccine x 1 dose    micafungin 100 mg in sodium chloride 0.9 % 100 mL IVPB (ready to mix system) Q24H    midodrine tablet 15 mg TID    ondansetron tablet 8 mg Q6H PRN    oxyCODONE immediate release tablet 5 mg Q6H PRN    polyethylene glycol packet 17 g Daily    promethazine (PHENERGAN) 12.5 mg in dextrose 5 % 50 mL IVPB Q6H PRN    senna-docusate 8.6-50 mg per tablet 1 tablet BID    sevelamer carbonate tablet 1,600 mg TID WM    sodium chloride 0.9% flush 10 mL PRN    thiamine tablet 100 mg Daily    traMADol tablet 50 mg Q6H PRN       Objective:     Vital Signs (Most Recent):  Temp: 96.3 °F (35.7 °C) (12/05/19 1034)  Pulse: 83 (12/05/19 1034)  Resp: 17 (12/05/19 1034)  BP: (!) 100/48 (12/05/19 1034)  SpO2: (!) 89 % (12/05/19 1034)  O2 Device (Oxygen Therapy): room air (12/03/19 2000) Vital Signs (24h Range):  Temp:  [96.3 °F (35.7 °C)-97.6 °F (36.4 °C)] 96.3 °F (35.7 °C)  Pulse:  [83-91] 83  Resp:  [16-20] 17  SpO2:  [89 %-97 %] 89 %  BP: ()/(37-56) 100/48     Weight: 129.3 kg (285 lb 0.9 oz) (12/04/19 1000)  Body mass index is 36.6 kg/m².  Body surface area is 2.6 meters squared.    I/O last 3 completed shifts:  In: 250 [P.O.:250]  Out: 100 [Urine:100]    Physical Exam   Constitutional: He is oriented to person, place, and time. He appears well-developed and well-nourished.   HENT:   Head: Normocephalic and atraumatic.   Cardiovascular: Normal rate and regular rhythm.   Pulmonary/Chest: Effort normal and breath sounds normal.   Abdominal: Soft. He exhibits distension.   Musculoskeletal: Normal range of motion. He exhibits no edema.   Neurological: He is alert and oriented to person, place, and time.   Vitals reviewed.      Significant Labs:  CBC:   Recent Labs   Lab 12/05/19  0512   WBC 24.87*   RBC 1.88*   HGB 6.8*   HCT  21.7*   PLT 98*   *   MCH 36.2*   MCHC 31.3*     CMP:   Recent Labs   Lab 12/05/19  0512   GLU 94   CALCIUM 8.2*   ALBUMIN 2.7*   PROT 5.2*   *   K 6.1*   CO2 14*   CL 92*   *   CREATININE 11.8*   ALKPHOS 273*   ALT 25   *   BILITOT 9.8*     All labs within the past 24 hours have been reviewed.         Assessment/Plan:     Hyperkalemia  Likely secondary to oliguria, metabolic acidosis(bicarb 14 today) and progressive acute renal failure    Plan/Recommendations  - Adjust CRRT baths to latest renal panel  - Low potassium diet (renal diet)  - Renal panel as scheduled with CRRT initiation       Liver transplant candidate  Managed by primary team    Acute renal failure with tubular necrosis  Unclear at this time etiology of LORENE, but likely from HRS (hyponatremia, low/normal Bps) vs prolonged prerenal or volume depletion after paracentesis 6L removal at prior hospital  - Renal US, no significant abnormalities seen in kidneys  - U/P Cr ratio: 0.23; urine sodium<20  - Central line removed in setting of fungemia. Per primary team and ID, trying to give patient some time without central line; requesting earliest line placement 12/6  - 12/4 developed hyperkalemia to 5.4    Plan/ Recommendations:   - Trialysis line placement for CRRT initiation in ICU. Patient and parent were oriented about current treatment plan, need for RRT and expressed understanding. Will adjust CRRT baths accordingly. Will start with 10 hr session for volume removal and metabolic clerance.   - Monitor hemodynamics   - Strict I/Os and chart  - MAP >65  - Hb > 7 gm/dL  - Avoid nephrotoxic agents, NSAIDs, IV contrast, etc  - Will follow closely      Decompensated hepatic cirrhosis  Per primary team  Liver team evaluation for txp          Thank you for your consult. I will follow-up with patient. Please contact us if you have any additional questions.    Conor King MD  Nephrology  [facility]    ATTENDING  PHYSICIAN ATTESTATION  I have personally verified the history and examined the patient. I thoroughly reviewed the demographic, clinical, laboratorial and imaging information available in medical records. I agree with the assessment and recommendations provided by the subspecialty resident who was under my supervision.

## 2019-12-05 NOTE — PLAN OF CARE
Problem: Physical Therapy Goal  Goal: Physical Therapy Goal  Description  Goals to be met by: 19     Patient will increase functional independence with mobility by performin. Supine to sit with Set-up Fenton  2. Sit to supine with Set-up Fenton  3. Sit to stand transfer with Stand-by Assistance  4. Bed to chair transfer with Stand-by Assistance using Rolling Walker or appropriate AD  5. Gait  x 150 feet with Stand-by Assistance using Rolling Walker. Or LRAD or w/o AD, as appropriate  6. Lower extremity exercise program x15 reps per with assistance as needed     PT eval completed. Afterwards, nurse reports patient is transferring to ICU, will be re-evaluated when appropriate. Katherine Hurtado, PT

## 2019-12-05 NOTE — ASSESSMENT & PLAN NOTE
Unclear at this time etiology of LORENE, but likely from HRS (hyponatremia, low/normal Bps) vs prolonged prerenal or volume depletion after paracentesis 6L removal at prior hospital  - Renal US, no significant abnormalities seen in kidneys  - U/P Cr ratio: 0.23; urine sodium<20  - Central line removed in setting of fungemia. Per primary team and ID, trying to give patient some time without central line; requesting earliest line placement 12/6  - 12/4 developed hyperkalemia to 5.4    Plan/ Recommendations:   - Trialysis line placement for CRRT initiation in ICU. Patient and parent were oriented about current treatment plan, need for RRT and expressed understanding. Will adjust CRRT baths accordingly. Will start with 10 hr session for volume removal and metabolic clerance.   - Monitor hemodynamics   - Strict I/Os and chart  - MAP >65  - Hb > 7 gm/dL  - Avoid nephrotoxic agents, NSAIDs, IV contrast, etc  - Will follow closely

## 2019-12-05 NOTE — ASSESSMENT & PLAN NOTE
Unclear at this time etiology of LORENE, but likely from HRS (hyponatremia, low/normal Bps) vs prolonged prerenal or volume depletion after paracentesis 6L removal at prior hospital  - Renal US, no significant abnormalities seen in kidneys  - U/P Cr ratio: 0.23; urine sodium<20  - Central line removed in setting of fungemia. Per primary team and ID, trying to give patient some time without central line; requesting earliest line placement 12/6  - 12/4 developed hyperkalemia to 5.4    Plan/ Recommendations:   - CRRT session yesterday achieved the metabolic clearance that was expected. No need for RRT today. Will reevaluate in a daily basis.   - MAP >65  - Strict I/Os and chart  - Hb > 7 gm/dL  - Avoid nephrotoxic agents, NSAIDs, IV contrast, etc  - Will follow closely

## 2019-12-05 NOTE — PROCEDURES
"Luis Hurd is a 34 y.o. male patient.    Temp: 97.4 °F (36.3 °C) (12/05/19 1315)  Pulse: 87 (12/05/19 1420)  Resp: 17 (12/05/19 1420)  BP: (!) 100/47 (12/05/19 1420)  SpO2: 100 % (12/05/19 1420)  Weight: 129.3 kg (285 lb 0.9 oz) (12/04/19 1000)  Height: 6' 2" (188 cm) (12/04/19 1000)       Central Line  Date/Time: 12/5/2019 2:48 PM  Performed by: Nathen Titus MD  Consent Done: Yes  Site marked: the operative site was marked  Time out: Immediately prior to procedure a "time out" was called to verify the correct patient, procedure, equipment, support staff and site/side marked as required.  Indications: med administration, hemodynamic monitoring and vascular access  Anesthesia: local infiltration    Anesthesia:  Local anesthesia used: yes  Local Anesthetic: lidocaine 1% with epinephrine  Anesthetic total: 5 mL  Preparation: skin prepped with ChloraPrep  Skin prep agent dried: skin prep agent completely dried prior to procedure  Sterile barriers: all five maximum sterile barriers used - cap, mask, sterile gown, sterile gloves, and large sterile sheet  Hand hygiene: hand hygiene performed prior to central venous catheter insertion  Location details: right internal jugular  Catheter type: triple lumen  Catheter size: 12 Fr  Ultrasound guidance: yes  Vessel Caliber: medium, compressibility normal  Needle advanced into vessel with real time Ultrasound guidance.  Guidewire confirmed in vessel.  Manometry: Yes  Number of attempts: 1  Specimens: No  Post-procedure: line sutured,  chlorhexidine patch,  sterile dressing applied and blood return through all ports  Complications: No          Nathen Titus  12/5/2019  "

## 2019-12-05 NOTE — ASSESSMENT & PLAN NOTE
Patient is a 34-year-old male with history of alcohol abuse in new diagnosis of alcoholic cirrhosis who presented with SBP and pancreatitis, worsening despite treatment with IV antibiotics.  Repeat infx workup notable for fungemia currently on treatment and undergoing transplant evaluation. Renal failure progressively worsening since admission and will be starting HD.  Complete transplant evaluation, approved pending insurance approval.  On 12/05 looks acutely unwell with hypotension, with concern for developing sepsis.    Plan  - given acute worsening, recommend transfer to ICU, repeat infectious workup  - SBP. Completed antibiotics, negative para 12/3.  Will repeat paracentesis  - fungemia likely 2/2 GI translocation. On micafungin. Negative optho examination. Negative echo. To complete 2 week of therapy on 12/17.  Repeat cultures negative  -LORENE, likely HRS. Worsening, will start HD.  Likely CRRT while in ICU  -varices screening:  No EGD in the past  -hepatocellular carcinoma screening:  CTAP and RUQ neg. AFP WNL  - encouraged PT and nutrition  -transplant candidacy:  Approved, pending insurance

## 2019-12-05 NOTE — ASSESSMENT & PLAN NOTE
Likely secondary to oliguria, metabolic acidosis(bicarb 14 today) and progressive acute renal failure    Plan/Recommendations  - Adjust CRRT baths to latest renal panel  - Low potassium diet (renal diet)  - Renal panel as scheduled with CRRT initiation

## 2019-12-05 NOTE — ASSESSMENT & PLAN NOTE
35 yo male stepped up to SICU for line and hemodynamic stabilization while awaiting CRRT.  Possible transplant list candidate.      Neuro  -AOx3  -No acute issues  -Normal mentation    Cardio  -HDS at time of step up.  -Low diastolic pressures, obtaining a line and will continue to monitor.     Pulm  -Orthopnea, likely 2/2 relative fluid overload  -CRRT this evening.  O2 via NC if needed.  -Will consider CXR     SHANIKA  # Decompensated alcohol cirrhosis with ascites  # Alcohol hepatitis with ascites  # Severe alcohol dependence   MELD-Na score: 34 at 11/25/2019 10:07 PM  MELD score: 32 at 11/25/2019 10:07 PM  Calculated from:  Serum Creatinine: 3.1 mg/dL at 11/25/2019 10:07 PM  Serum Sodium: 131 mmol/L at 11/25/2019 10:07 PM  Total Bilirubin: 10.2 mg/dL at 11/25/2019 10:07 PM  INR(ratio): 1.7 at 11/25/2019 10:07 PM  Age: 34 years  - patient's last drink about a month ago  - hepatology consulted  - addiction psych consulted   - PETH pending; urine tox  - getting U/S liver with doppler  - will likely need inpatient liver transplant evaluation; supportive family and good insight into his alcohol abuse  - IR paracentesis in AM, no more than 4L to be removed  - viral studies OSH largely negative  - thiamine, folate     # Acute pancreatitis   - due to alcohol   - lipase improved from OSH;   - will trial on CLD; no evidence of stones on CT OSH     # Hepatorenal Syndrome  - was being treated for HRS at OSH with rise in Cr with SBP  - patient also had IV contrast and had large volume paracentesis and does not appear to have gotten albumin after  - will start on albumin and octreotide here, BP ok   - getting urine studies and renal U/S  - nephrology consult  - strict I/O     # 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  - dietary     Diet:  CLD  DVT PPx:  Heparin   Goals of Care:  full

## 2019-12-05 NOTE — HPI
Patient is a 33 yo male with pmh of etoh abuse, alcoholic cirrhosis, history of morbid obesity, who was admitted to OSH 1 week ago for abd pain and LE edema.  Initial evaluation consistent with SBP and a pancreatitis, however patient did not improve with IV ceftriaxone or pentoxifylline with worsening leukocytosis and kidney function.  Transferred to [facility] for further evaluation.  Stepped up to SICU needing central access for CRRT as well as closer BP monitoring.

## 2019-12-06 NOTE — PROGRESS NOTES
[facility]  Critical Care - Surgery  Progress Note    Patient Name: Luis Hurd  MRN: [id number]  Admission Date: 11/25/2019  Hospital Length of Stay: 11 days  Code Status: Full Code  Attending Provider: Eugenio Nettles MD  Primary Care Provider: William Nettles MD   Principal Problem: Spontaneous bacterial peritonitis    Subjective:     Hospital/ICU Course:  Pt arrived from OSH.  Newly diagnosed alcoholic cirrhosis, pt accepted to SICU with LTS as primary.  Renal function declining.  RIJ trialysis catheter placed with plan for patient to possibly receive CRRT evening of 12/5/19.      12/6/19 - Pt received CRRT overnight, requiring pressor support at this time.  Otherwise doing well, possibly being put on transplant list today.           Past Medical History:   Diagnosis Date    Acute pancreatitis 11/26/2019    Alcoholic cirrhosis of liver with ascites 11/26/2019    Severe alcohol dependence 11/26/2019    Spontaneous bacterial peritonitis 11/25/2019       History reviewed. No pertinent surgical history.    Review of patient's allergies indicates:  No Known Allergies    Family History     None        Tobacco Use    Smoking status: Never Smoker    Smokeless tobacco: Never Used   Substance and Sexual Activity    Alcohol use: Not on file    Drug use: Not on file    Sexual activity: Not on file      Review of Systems   Constitutional: Positive for fatigue. Negative for chills and fever.   HENT: Negative.    Respiratory: Positive for shortness of breath. Negative for cough and chest tightness.    Cardiovascular: Positive for leg swelling. Negative for chest pain.   Gastrointestinal: Positive for abdominal distention and abdominal pain. Negative for constipation, nausea and vomiting.   Endocrine: Negative.    Genitourinary: Negative.    Musculoskeletal: Negative.    Skin: Positive for color change.   Allergic/Immunologic: Negative.    Neurological: Negative.      Objective:     Vital Signs  (Most Recent):  Temp: 97.7 °F (36.5 °C) (12/06/19 0701)  Pulse: 91 (12/06/19 0930)  Resp: (!) 25 (12/06/19 0930)  BP: (!) 109/53 (12/06/19 0915)  SpO2: (!) 93 % (12/06/19 0930) Vital Signs (24h Range):  Temp:  [96.3 °F (35.7 °C)-98 °F (36.7 °C)] 97.7 °F (36.5 °C)  Pulse:  [83-99] 91  Resp:  [13-55] 25  SpO2:  [89 %-100 %] 93 %  BP: ()/(42-60) 109/53  Arterial Line BP: ()/(25-52) 103/36     Weight: 129.3 kg (285 lb 0.9 oz)  Body mass index is 36.6 kg/m².      Intake/Output Summary (Last 24 hours) at 12/6/2019 0948  Last data filed at 12/6/2019 0900  Gross per 24 hour   Intake 2603.1 ml   Output 3112 ml   Net -508.9 ml       Physical Exam   Constitutional: He is oriented to person, place, and time. No distress.   HENT:   Head: Normocephalic.   Eyes: Pupils are equal, round, and reactive to light. Scleral icterus is present.   Neck: Neck supple.   Cardiovascular: Normal rate, regular rhythm, normal heart sounds and intact distal pulses.   Pulmonary/Chest: Effort normal and breath sounds normal. No respiratory distress.   Abdominal: He exhibits distension. There is tenderness. There is no rebound and no guarding.   Neurological: He is alert and oriented to person, place, and time.   Skin: Skin is warm and dry. He is not diaphoretic.   Psychiatric: He has a normal mood and affect. His behavior is normal.   Nursing note and vitals reviewed.      Vents:       Lines/Drains/Airways     Central Venous Catheter Line                 Trialysis (Dialysis) Catheter 12/05/19 1429 right internal jugular less than 1 day          Arterial Line                 Arterial Line 12/05/19 1544 Right Radial less than 1 day          Peripheral Intravenous Line                 Peripheral IV - Single Lumen 12/03/19 1100 18 G Anterior;Right Upper Arm 2 days                Significant Labs:    CBC/Anemia Profile:  Recent Labs   Lab 12/05/19  1656 12/05/19  2128 12/06/19  0300   WBC 27.99* 30.32* 31.37*   HGB 6.9* 7.9* 7.4*   HCT 21.0*  24.3* 22.9*   PLT 91* 104* 103*   * 105* 107*   RDW 18.7* 18.9* 19.7*        Chemistries:  Recent Labs   Lab 12/05/19  0512 12/05/19  1710 12/05/19 2128 12/06/19  0300   * 127* 134* 137  137   K 6.1* 6.2* 4.7 4.1  4.1   CL 92* 91* 94* 94*  94*   CO2 14* 15* 20* 25  25   * 147* 87* 53*  53*   CREATININE 11.8* 12.3* 7.3* 4.4*  4.4*   CALCIUM 8.2* 8.5* 8.0* 8.3*  8.3*   ALBUMIN 2.7* 2.8* 2.9* 3.2*  3.2*   PROT 5.2* 5.3*  --  5.8*   BILITOT 9.8* 11.8*  --  12.2*   ALKPHOS 273* 289*  --  329*   ALT 25 27  --  31   * 125*  --  143*   MG 3.4*  --  2.7* 2.3  2.3   PHOS 7.2*  --  4.7* 3.8  3.8       All pertinent labs within the past 24 hours have been reviewed.    Significant Imaging: I have reviewed all pertinent imaging results/findings within the past 24 hours.    Assessment/Plan:     Decompensated hepatic cirrhosis  33 yo male with decompensated alcoholic cirrhosis on LTS in SICU for hemodynamic stabilization and CRRT in the setting of ARF 2/2 decompensated liver failure.  Possible transplant list candidate.      Neuro  -AOx3  -No acute issues  -Normal mentation    Cardio  -Pressor requirements of 0.08 levo / 0.04 vaso.  -Continue invasive BP monitoring.  -Wean pressors AT  -Attempt to achieve euvolemia.      Pulm  -Orthopnea, likely 2/2 relative fluid overload  -CRRT.  O2 via NC if needed.    SHANIKA  # Decompensated alcohol cirrhosis with ascites  # Alcohol hepatitis with ascites  # Severe alcohol dependence   MELD-Na score: 34 at 11/25/2019 10:07 PM  MELD score: 32 at 11/25/2019 10:07 PM  - patient's last drink about a month ago  - hepatology consulted  - addiction psych consulted   - PETH pending; urine tox  - getting U/S liver with doppler  - viral studies OSH largely negative  - Likely to be listed as liver transplant candidate.     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      Renal     # Hyponatremia  - Continue to monitor, daily bmp  - CRRT     # Anemia of chronic disease  - iron studies, ferritin and folate       Diet:  CLD  DVT PPx:  Heparin   Goals of Care:  full          Critical secondary to Patient has a condition that poses threat to life and bodily function: SBP, acute pancreatitis, liver and renal failure.         Pedro Jacobsen DO  Critical Care - Surgery  [facility]

## 2019-12-06 NOTE — PLAN OF CARE
"Dx: Spontaneous bacterial peritonitis    Shift Events: Vasopressin started. CRRT run. Acute heart rate change. Pressors titrated. KUB obtained. Nausea treated.     Neuro: AAO x4, Follows Commands and Moves All Extremities    Vital Signs: BP (!) 115/55 (BP Location: Left arm, Patient Position: Lying)   Pulse 97   Temp 98 °F (36.7 °C) (Oral)   Resp (!) 44   Ht 6' 2" (1.88 m)   Wt 129.3 kg (285 lb 0.9 oz)   SpO2 (!) 94%   BMI 36.60 kg/m²     Diet: Renal diet    Gtts: Norepinephrine and Vasopressin    Urine Output: Anuric due to patient being on CRRT.    CRRT SLED for 10 hours at goal UF rate of 300. Patient tolerated well. Pressors titrated accordingly.      Labs/Accuchecks:   - WBC: 31.57  - Hgb: 7.4  - Hct: 22.9  - Platelets: 103  - INR: 1.7  - Na: 137  - K: 4.1  - BUN: 53  - Creatinine: 4.4  - Ca: 8.3  - Phos: 3.8  - Ma.3  - AST: 143  - ALT: 31    Skin: Abdomen is distended and taut and firm on palpation with ecchymosis noted. Ecchymotic areas noted on all four extremities. No breakdown noted on any pressure points. Will continue to monitor.        "

## 2019-12-06 NOTE — SUBJECTIVE & OBJECTIVE
Interval History: Developed hypotension during CRRT overnight which required pressor support. No acute abnormalities on AM chem panel.     Review of patient's allergies indicates:  No Known Allergies  Current Facility-Administered Medications   Medication Frequency    0.9%  NaCl infusion (CRRT USE ONLY) Continuous    acetaminophen tablet 325 mg Q4H PRN    bisacodyl suppository 10 mg Daily PRN    calcium carbonate 200 mg calcium (500 mg) chewable tablet 500 mg TID PRN    cefTRIAXone injection 1 g Q24H    dextrose 10% (D10W) Bolus PRN    dextrose 10% (D10W) Bolus PRN    folic acid tablet 1 mg Daily    glucagon (human recombinant) injection 1 mg PRN    glucose chewable tablet 16 g PRN    glucose chewable tablet 24 g PRN    hepatitis B (HEPLISAV-B) 20 mcg/0.5 mL vaccine 0.5 mL vaccine x 1 dose    micafungin 100 mg in sodium chloride 0.9 % 100 mL IVPB (ready to mix system) Q24H    midodrine tablet 15 mg TID    norepinephrine 4 mg in dextrose 5% 250 mL infusion (premix) (titrating) Continuous    ondansetron injection 4 mg Q6H PRN    oxyCODONE immediate release tablet 5 mg Q6H PRN    polyethylene glycol packet 17 g Daily    promethazine (PHENERGAN) 12.5 mg in dextrose 5 % 50 mL IVPB Q6H PRN    senna-docusate 8.6-50 mg per tablet 1 tablet BID    sodium chloride 0.9% flush 10 mL PRN    thiamine tablet 100 mg Daily    traMADol tablet 50 mg Q6H PRN    vasopressin (PITRESSIN) 0.2 Units/mL in dextrose 5 % 100 mL infusion Continuous       Objective:     Vital Signs (Most Recent):  Temp: 97.6 °F (36.4 °C) (12/06/19 1101)  Pulse: 88 (12/06/19 1545)  Resp: 17 (12/06/19 1545)  BP: (!) 112/53 (12/06/19 1545)  SpO2: 96 % (12/06/19 1545)  O2 Device (Oxygen Therapy): room air (12/06/19 1501) Vital Signs (24h Range):  Temp:  [97.5 °F (36.4 °C)-98 °F (36.7 °C)] 97.6 °F (36.4 °C)  Pulse:  [86-99] 88  Resp:  [11-55] 17  SpO2:  [91 %-98 %] 96 %  BP: ()/(49-83) 112/53  Arterial Line BP: ()/(25-52) 99/33      Weight: 129.3 kg (285 lb 0.9 oz) (12/04/19 1000)  Body mass index is 36.6 kg/m².  Body surface area is 2.6 meters squared.    I/O last 3 completed shifts:  In: 2243.1 [I.V.:532; Blood:1011.1; IV Piggyback:700]  Out: 3112 [Other:3112]    Physical Exam   Constitutional: He is oriented to person, place, and time. He appears well-developed and well-nourished.   HENT:   Head: Normocephalic and atraumatic.   Cardiovascular: Normal rate and regular rhythm.   Pulmonary/Chest: Effort normal and breath sounds normal.   Abdominal: Soft. He exhibits distension.   Musculoskeletal: Normal range of motion. He exhibits no edema.   Neurological: He is alert and oriented to person, place, and time.   Vitals reviewed.      Significant Labs:  CBC:   Recent Labs   Lab 12/06/19  0300   WBC 31.37*   RBC 2.15*   HGB 7.4*   HCT 22.9*   *   *   MCH 34.4*   MCHC 32.3     CMP:   Recent Labs   Lab 12/06/19  0300 12/06/19  1429     108 135*   CALCIUM 8.3*  8.3* 8.6*   ALBUMIN 3.2*  3.2* 2.7*   PROT 5.8*  --      137 131*   K 4.1  4.1 4.7   CO2 25  25 19*   CL 94*  94* 94*   BUN 53*  53* 62*   CREATININE 4.4*  4.4* 5.3*   ALKPHOS 329*  --    ALT 31  --    *  --    BILITOT 12.2*  --      All labs within the past 24 hours have been reviewed.

## 2019-12-06 NOTE — TELEPHONE ENCOUNTER
"  LIVER WAIT LISTING NOTE    **NOTE:   IF ANY EXTERNAL LABS ARE USED FOR LISTING THE VALUES AND DATES MUST BE ENTERED IN EPIC TO GENERATE THIS NOTE**    Date of Financial clearance to list: 2019    N[facility]:        Organ: Liver  Name:       Luis uHrd    : 1985          Gender:     male      MRN#: [id number]                                 State of Permanent Residence:  [address] [place] [id number]  Ethnicity: /White   Race:      White    CLINICAL INFORMATION       ABO  ABO Blood Group:   A POS     ABO Confirmation: (THESE DATES MUST BE PRIOR TO THE LIST DATE AND SUPPORTED BY SEPARATE LAB REPORTS)    Internal Results    Lab Results   Component Value Date    GROUPTRH A POS 2019    GROUPTRH A POS 2019     No results found for: ABO    External Results    ABO Date 1:  ABO Result 1:  ABO Date 2:  ABO Result 2:     Are either of these ABO results based on External Labs? no  (If Yes, STOP and go to source document in Media Tab for verification).    VITALS  Height:    Ht Readings from Last 1 Encounters:   19 6' 2" (1.88 m)     Weight:    Wt Readings from Last 1 Encounters:   19 129.3 kg (285 lb 0.9 oz)       LIVER ORGAN INFORMATION  Candidate Medical Urgency Status: Active    Number of Previous Transplants: 0    MELD/PELD Data Collection:  Had dialysis twice, or 24 hours of CVVHD, within 1 week prior to the serum creatinine test: No  Encephalopathy: 1 - 2 Date: 2019  Ascites: moderate Date: 2019          MELD Score:  MELD-Na score: 35 at 2019  3:00 AM  MELD score: 35 at 2019  3:00 AM  Calculated from:  Serum Creatinine: 4.4 mg/dL (Rounded to 4 mg/dL) at 2019  3:00 AM  Serum Sodium: 137 mmol/L at 2019  3:00 AM  Total Bilirubin: 12.2 mg/dL at 2019  3:00 AM  INR(ratio): 1.7 at 2019  3:00 AM  Age: 34 years  Lab Results   Component Value Date    ALBUMIN 3.2 (L) 2019    ALBUMIN 3.2 (L) 2019     No results found " "for: EXTINR, EXTCREATININ, EXTSODIUM, EXTBILITOTAL, EXTALBUMIN    Additional Organs: none  Kidney: No    If Kidney is "Yes" above, check Diagnosis and enter the medical eligibility below for a simultaneous liver/kidney:    Diagnosis: Chronic kidney disease (CKD) with measured or calculated GFR less than or equal to 60 ml/min for greater than 90 consecutive days. At least one of the following must qualify for CKD:  Date Begun Dialysis CrCl (ml/min)  Must be < or = 30 eGFR (ml/min) Must be < or = 30    Yes/no:                    Diagnosis: Sustained acute kidney injury (must be confirmed at least once every 7 days). Please select at least one of the following criteria:  Date of test or treatment Dialysis received CrCl (ml/min) Must be < or = 30 eGFR (ml/min) Must be < or = 25 Number of days since previous test or treatment (must be less than or equal to 7 days)    Yes/No:                  Diagnosis: Metabolic disease, Check all diagnosis that apply:     Hyperoxaluria    Atypical hemolytic uremic syndrome (HUS) from mutations in factor H or factor I    Familial non-neuropathic systemic amyloidosis    Methylmalonic aciduria     Transplant nephrologist confirming candidate's most recent diagnosis for SLK: n/a               ## Please submit a separate Kidney Listing note for combined Liver/Kidney patients. ##    Will Recipient Accept?   Accept HBcAB Positive Organ:  yes  Accept HBV FARAZ Positive Organ:  yes  Accept HCV Antibody Positive Organ: yes   Accept HCV FARAZ Positive Organ:  yes                        Local: No                           Import: No  Accept DCD Organ:    yes  Minimum acceptable donor age:  5 years  Maximum acceptable donor age:  99 years  Minimum acceptable donor weight:  40 lbs    Maximum acceptable donor weight:  440 lb  Maximum miles the organ or  Recovery team will travel:   5000 miles    TCR Information    Citizenship: [place] Citizen   Country of permanent residence:   Year of entry to the [place]: "   Highest education level: Attended College/Technical School    Patient on Life Support: No  Functional Status: 20% - very sick, hospitalization necessary: active treatment necessary  Working for income: yes  If yes, working activity level: Working Full Time  Previous Pancreas Islet Infusion - No  Source of payment: Private Insurance  Diabetes: No  Any previous malignancy: No  Neoadjuvant Therapy: No  Has candidate ever had a diagnosis of HCC: No    Liver Medical Factors  Previous abdominal surgery: No  Spontaneous Bacterial Peritonitis: Yes  History of Portal Vein Thrombosis: No  Transjugular Intrahepatic Portosystemic Shunt: No

## 2019-12-06 NOTE — SUBJECTIVE & OBJECTIVE
Past Medical History:   Diagnosis Date    Acute pancreatitis 11/26/2019    Alcoholic cirrhosis of liver with ascites 11/26/2019    Severe alcohol dependence 11/26/2019    Spontaneous bacterial peritonitis 11/25/2019       History reviewed. No pertinent surgical history.    Review of patient's allergies indicates:  No Known Allergies    Family History     None        Tobacco Use    Smoking status: Never Smoker    Smokeless tobacco: Never Used   Substance and Sexual Activity    Alcohol use: Not on file    Drug use: Not on file    Sexual activity: Not on file      Review of Systems   Constitutional: Positive for fatigue. Negative for chills and fever.   HENT: Negative.    Respiratory: Positive for shortness of breath. Negative for cough and chest tightness.    Cardiovascular: Positive for leg swelling. Negative for chest pain.   Gastrointestinal: Positive for abdominal distention and abdominal pain. Negative for constipation, nausea and vomiting.   Endocrine: Negative.    Genitourinary: Negative.    Musculoskeletal: Negative.    Skin: Positive for color change.   Allergic/Immunologic: Negative.    Neurological: Negative.      Objective:     Vital Signs (Most Recent):  Temp: 97.7 °F (36.5 °C) (12/06/19 0701)  Pulse: 91 (12/06/19 0930)  Resp: (!) 25 (12/06/19 0930)  BP: (!) 109/53 (12/06/19 0915)  SpO2: (!) 93 % (12/06/19 0930) Vital Signs (24h Range):  Temp:  [96.3 °F (35.7 °C)-98 °F (36.7 °C)] 97.7 °F (36.5 °C)  Pulse:  [83-99] 91  Resp:  [13-55] 25  SpO2:  [89 %-100 %] 93 %  BP: ()/(42-60) 109/53  Arterial Line BP: ()/(25-52) 103/36     Weight: 129.3 kg (285 lb 0.9 oz)  Body mass index is 36.6 kg/m².      Intake/Output Summary (Last 24 hours) at 12/6/2019 0948  Last data filed at 12/6/2019 0900  Gross per 24 hour   Intake 2603.1 ml   Output 3112 ml   Net -508.9 ml       Physical Exam   Constitutional: He is oriented to person, place, and time. No distress.   HENT:   Head: Normocephalic.    Eyes: Pupils are equal, round, and reactive to light. Scleral icterus is present.   Neck: Neck supple.   Cardiovascular: Normal rate, regular rhythm, normal heart sounds and intact distal pulses.   Pulmonary/Chest: Effort normal and breath sounds normal. No respiratory distress.   Abdominal: He exhibits distension. There is tenderness. There is no rebound and no guarding.   Neurological: He is alert and oriented to person, place, and time.   Skin: Skin is warm and dry. He is not diaphoretic.   Psychiatric: He has a normal mood and affect. His behavior is normal.   Nursing note and vitals reviewed.      Vents:       Lines/Drains/Airways     Central Venous Catheter Line                 Trialysis (Dialysis) Catheter 12/05/19 1429 right internal jugular less than 1 day          Arterial Line                 Arterial Line 12/05/19 1544 Right Radial less than 1 day          Peripheral Intravenous Line                 Peripheral IV - Single Lumen 12/03/19 1100 18 G Anterior;Right Upper Arm 2 days                Significant Labs:    CBC/Anemia Profile:  Recent Labs   Lab 12/05/19  1656 12/05/19 2128 12/06/19  0300   WBC 27.99* 30.32* 31.37*   HGB 6.9* 7.9* 7.4*   HCT 21.0* 24.3* 22.9*   PLT 91* 104* 103*   * 105* 107*   RDW 18.7* 18.9* 19.7*        Chemistries:  Recent Labs   Lab 12/05/19  0512 12/05/19  1710 12/05/19 2128 12/06/19  0300   * 127* 134* 137  137   K 6.1* 6.2* 4.7 4.1  4.1   CL 92* 91* 94* 94*  94*   CO2 14* 15* 20* 25  25   * 147* 87* 53*  53*   CREATININE 11.8* 12.3* 7.3* 4.4*  4.4*   CALCIUM 8.2* 8.5* 8.0* 8.3*  8.3*   ALBUMIN 2.7* 2.8* 2.9* 3.2*  3.2*   PROT 5.2* 5.3*  --  5.8*   BILITOT 9.8* 11.8*  --  12.2*   ALKPHOS 273* 289*  --  329*   ALT 25 27  --  31   * 125*  --  143*   MG 3.4*  --  2.7* 2.3  2.3   PHOS 7.2*  --  4.7* 3.8  3.8       All pertinent labs within the past 24 hours have been reviewed.    Significant Imaging: I have reviewed all pertinent imaging  results/findings within the past 24 hours.

## 2019-12-06 NOTE — PROGRESS NOTES
[facility]  Nephrology  Progress Note    Patient Name: Luis Hurd  MRN: [id number]  Admission Date: 11/25/2019  Hospital Length of Stay: 11 days  Attending Provider: Eugenio Nettles MD   Primary Care Physician: William Nettles MD  Principal Problem:Spontaneous bacterial peritonitis    Subjective:     HPI: 35 y/o man with hx of alcohol abuse, alcoholic cirrhosis decompensated with ascites,  morbid obesity, no history of CKD baseline sCr 0.7 (as of 11/20/2019), admitted on 11/25/2019 as a transfer from OSH for liver txp evaluation and LORENE, concern for HRS.  Originally presented on 11/20 to OSH with worsening abdominal pain and distention, along with increased lower extremity edema, and also diagnosed with SBP and pancreatitis.  Last drink ~1 month ago (started drinking ~15 yrs ago, with 2-3 wine bottles/day).  On admit, sCr 0.7, had paracentesis with ~6L removal, administered albumin post procedure, no report of acute hypotension during previous hospital stay, yet subsequently developed rise in sCr to 1.63 on 11/22, 2.39 on 11/23, 2.44 on 11/25, and labs on arrival to [facility] on 11/25 up to 3.1.  He was initiated in HRS treatment, receiving albumin, octreotide, and midodrine.  Ofr SBP started on ceftriaxone.      Nephrology consulted for evaluation of Lorene.      Interval History: Developed hypotension during CRRT overnight which required pressor support. No acute abnormalities on AM chem panel.     Review of patient's allergies indicates:  No Known Allergies  Current Facility-Administered Medications   Medication Frequency    0.9%  NaCl infusion (CRRT USE ONLY) Continuous    acetaminophen tablet 325 mg Q4H PRN    bisacodyl suppository 10 mg Daily PRN    calcium carbonate 200 mg calcium (500 mg) chewable tablet 500 mg TID PRN    cefTRIAXone injection 1 g Q24H    dextrose 10% (D10W) Bolus PRN    dextrose 10% (D10W) Bolus PRN    folic acid tablet 1 mg Daily    glucagon (human recombinant)  injection 1 mg PRN    glucose chewable tablet 16 g PRN    glucose chewable tablet 24 g PRN    hepatitis B (HEPLISAV-B) 20 mcg/0.5 mL vaccine 0.5 mL vaccine x 1 dose    micafungin 100 mg in sodium chloride 0.9 % 100 mL IVPB (ready to mix system) Q24H    midodrine tablet 15 mg TID    norepinephrine 4 mg in dextrose 5% 250 mL infusion (premix) (titrating) Continuous    ondansetron injection 4 mg Q6H PRN    oxyCODONE immediate release tablet 5 mg Q6H PRN    polyethylene glycol packet 17 g Daily    promethazine (PHENERGAN) 12.5 mg in dextrose 5 % 50 mL IVPB Q6H PRN    senna-docusate 8.6-50 mg per tablet 1 tablet BID    sodium chloride 0.9% flush 10 mL PRN    thiamine tablet 100 mg Daily    traMADol tablet 50 mg Q6H PRN    vasopressin (PITRESSIN) 0.2 Units/mL in dextrose 5 % 100 mL infusion Continuous       Objective:     Vital Signs (Most Recent):  Temp: 97.6 °F (36.4 °C) (12/06/19 1101)  Pulse: 88 (12/06/19 1545)  Resp: 17 (12/06/19 1545)  BP: (!) 112/53 (12/06/19 1545)  SpO2: 96 % (12/06/19 1545)  O2 Device (Oxygen Therapy): room air (12/06/19 1501) Vital Signs (24h Range):  Temp:  [97.5 °F (36.4 °C)-98 °F (36.7 °C)] 97.6 °F (36.4 °C)  Pulse:  [86-99] 88  Resp:  [11-55] 17  SpO2:  [91 %-98 %] 96 %  BP: ()/(49-83) 112/53  Arterial Line BP: ()/(25-52) 99/33     Weight: 129.3 kg (285 lb 0.9 oz) (12/04/19 1000)  Body mass index is 36.6 kg/m².  Body surface area is 2.6 meters squared.    I/O last 3 completed shifts:  In: 2243.1 [I.V.:532; Blood:1011.1; IV Piggyback:700]  Out: 3112 [Other:3112]    Physical Exam   Constitutional: He is oriented to person, place, and time. He appears well-developed and well-nourished.   HENT:   Head: Normocephalic and atraumatic.   Cardiovascular: Normal rate and regular rhythm.   Pulmonary/Chest: Effort normal and breath sounds normal.   Abdominal: Soft. He exhibits distension.   Musculoskeletal: Normal range of motion. He exhibits no edema.   Neurological: He is  alert and oriented to person, place, and time.   Vitals reviewed.      Significant Labs:  CBC:   Recent Labs   Lab 12/06/19  0300   WBC 31.37*   RBC 2.15*   HGB 7.4*   HCT 22.9*   *   *   MCH 34.4*   MCHC 32.3     CMP:   Recent Labs   Lab 12/06/19  0300 12/06/19  1429     108 135*   CALCIUM 8.3*  8.3* 8.6*   ALBUMIN 3.2*  3.2* 2.7*   PROT 5.8*  --      137 131*   K 4.1  4.1 4.7   CO2 25  25 19*   CL 94*  94* 94*   BUN 53*  53* 62*   CREATININE 4.4*  4.4* 5.3*   ALKPHOS 329*  --    ALT 31  --    *  --    BILITOT 12.2*  --      All labs within the past 24 hours have been reviewed.         Assessment/Plan:     Hyperkalemia  Likely secondary to oliguria, metabolic acidosis(bicarb 14 today) and progressive acute renal failure    Plan/Recommendations  - Adjust CRRT baths to latest renal panel  - Low potassium diet (renal diet)  - Renal panel as scheduled with CRRT initiation       Liver transplant candidate  Managed by primary team    Encounter for pre-transplant evaluation for liver transplant  Managed by primary team    Acute renal failure with tubular necrosis  Unclear at this time etiology of LORENE, but likely from HRS (hyponatremia, low/normal Bps) vs prolonged prerenal or volume depletion after paracentesis 6L removal at prior hospital  - Renal US, no significant abnormalities seen in kidneys  - U/P Cr ratio: 0.23; urine sodium<20  - Central line removed in setting of fungemia. Per primary team and ID, trying to give patient some time without central line; requesting earliest line placement 12/6  - 12/4 developed hyperkalemia to 5.4    Plan/ Recommendations:   - CRRT session yesterday achieved the metabolic clearance that was expected. No need for RRT today. Will reevaluate in a daily basis.   - MAP >65  - Strict I/Os and chart  - Hb > 7 gm/dL  - Avoid nephrotoxic agents, NSAIDs, IV contrast, etc  - Will follow closely          Thank you for your consult. I will follow-up  with patient. Please contact us if you have any additional questions.    Conor King MD  Nephrology  [facility]    ATTENDING PHYSICIAN ATTESTATION  I have personally verified the history and examined the patient. I thoroughly reviewed the demographic, clinical, laboratorial and imaging information available in medical records. I agree with the assessment and recommendations provided by the subspecialty resident who was under my supervision.

## 2019-12-06 NOTE — PROGRESS NOTES
Progress Note  Hospital Medicine  [facility]         Patient Name: Luis Hurd  MRN:  [id number]  Hospital Medicine Team: [facility] MED L Ender Cervantes MD  Date of Admission:  11/25/2019     Length of Stay:  LOS: 10 days   Expected Discharge Date: 12/9/2019  Principal Problem:  Spontaneous bacterial peritonitis     Subjective:     Interval History/Overnight Events:  Patient feeling weak today, BP dropped down to 80/30, started on midodrine 15 mg PO TID and giving 1 unit of PRBC; patient is having uremic symptoms and at this point patient not able to do HD due to BP, will need CRRT, spoke with nephrology and hepatology, will transfer patient to LTS and SICU as patient will be listed soon; transferring patient to SICU; family updated;       cefTRIAXone (ROCEPHIN) IVPB  1 g Intravenous Q24H    folic acid  1 mg Oral Daily    micafungin (MYCAMINE) IVPB  100 mg Intravenous Q24H    midodrine  15 mg Oral TID    polyethylene glycol  17 g Oral Daily    senna-docusate 8.6-50 mg  1 tablet Oral BID    sevelamer carbonate  1,600 mg Oral TID WM    thiamine  100 mg Oral Daily        sodium chloride 0.9% 200 mL/hr at 12/05/19 1900    norepinephrine bitartrate-D5W 0.08 mcg/kg/min (12/05/19 1900)       acetaminophen, bisacodyl, calcium carbonate, Dextrose 10% Bolus, Dextrose 10% Bolus, glucagon (human recombinant), glucose, glucose, hepatitis B, magnesium sulfate IVPB, ondansetron, oxyCODONE, promethazine (PHENERGAN) IVPB, sodium chloride 0.9%, sodium phosphate IVPB, sodium phosphate IVPB, sodium phosphate IVPB, traMADol    Review of Systems   Constitutional: Negative for chills, fatigue, fever.   HENT: Negative for sore throat, trouble swallowing.    Eyes: Negative for photophobia, visual disturbance.   Respiratory: Negative for cough, shortness of breath.    Cardiovascular: Negative for chest pain, palpitations, leg swelling.   Gastrointestinal: Negative for abdominal pain,  constipation, diarrhea, nausea, vomiting.   Endocrine: Negative for cold intolerance, heat intolerance.   Genitourinary: Negative for dysuria, frequency.   Musculoskeletal: Negative for arthralgias, myalgias.   Skin: Negative for rash, wound, erythema   Neurological: Negative for dizziness, syncope, weakness, light-headedness.   Psychiatric/Behavioral: Negative for confusion, hallucinations, anxiety  All other systems reviewed and are negative.    Objective:     Temp:  [96.3 °F (35.7 °C)-97.6 °F (36.4 °C)]   Pulse:  [83-95]   Resp:  [13-20]   BP: ()/(37-60)   SpO2:  [89 %-100 %]   Arterial Line BP: ()/(25-41)       Physical Exam:  Constitutional: appears older than stated age, chronically ill looking,  non-distressed, not diaphoretic.   HENT: NC/AT, external ears normal, oropharynx clear, MMM w/o exudates.   Eyes: PERRL, EOMI, conjunctiva normal, no discharge b/l, +scleral icterus   Neck: normal ROM, supple  CV: RRR, no m/r/g, no carotid bruits, +2 peripheral pulses.  Pulmonary/Chest wall: Breathing comfortably w/o distress, CTAB, no w/r/r, no crackles.    GI: Soft, non-tender, (+) BS, (+) BM   +distended,  Caput medusa present,  Obese abdomen  Musculoskeletal: Normal ROM, no atrophy,  +trace pitting edema in LE bilaterally   Neurological: AAO x 4, CN II-XI in tact, nl sensation, nl strength/tone  No asterixis   Skin: warm, dry   +pallor, jaundice, + spider angiomas, +bruising   Psych: normal mood and affect, normal behavior, thought content and judgement.    Labs:    Chemistries:   Recent Labs   Lab 12/03/19  0529  12/04/19  0412 12/05/19  0512 12/05/19  1710   *   < > 129* 128* 127*   K 5.5*   < > 5.4* 6.1* 6.2*   CL 91*   < > 92* 92* 91*   CO2 18*   < > 18* 14* 15*   *   < > 129* 145* 147*   CREATININE 9.7*   < > 10.5* 11.8* 12.3*   CALCIUM 8.5*   < > 8.0* 8.2* 8.5*   PROT 5.7*  --  5.4* 5.2* 5.3*   BILITOT 9.9*  --  9.1* 9.8* 11.8*   ALKPHOS 263*  --  253* 273* 289*   ALT 26  --  22 25  27   AST 94*  --  81* 100* 125*   MG 3.4*  --  3.4* 3.4*  --    PHOS 7.8*  --  7.1* 7.2*  --     < > = values in this interval not displayed.        WBC:   Recent Labs   Lab 12/02/19  0331 12/03/19  0529 12/04/19  0412 12/05/19  0512 12/05/19  1656   WBC 27.47* 26.73* 22.00* 24.87* 27.99*     Bands:     CBC/Anemia Labs: Coags:    Recent Labs   Lab 12/04/19  0412 12/05/19  0512 12/05/19  1656   WBC 22.00* 24.87* 27.99*   HGB 6.9* 6.8* 6.9*   HCT 21.6* 21.7* 21.0*   PLT 69* 98* 91*   * 115* 109*   RDW 15.9* 16.8* 18.7*    Recent Labs   Lab 12/03/19  0529 12/04/19 0412 12/05/19 0512   INR 1.4* 1.5* 1.5*        Diagnostic Results:       Ref. Range 11/26/2019 07:45 11/29/2019 15:56   Fluid Color Unknown Yellow Yellow   Fluid Appearance Unknown Cloudy Cloudy   Body Fluid Type Unknown Ascites Ascites   WBC, Body Fluid Latest Units: /cu mm 855 468   Segs, Fluid Latest Units: % 49 27   Lymphs, Fluid Latest Units: % 13 36   Monocytes/Macrophages, Fluid Latest Units: % 37 34   Eos, Fluid Latest Units: %  2     Blood culture [[id number]] (Abnormal) Collected: 11/29/19 1143   Order Status: Completed Specimen: Blood Updated: 12/01/19 1206    Blood Culture, Routine Gram stain teresa bottle: yeast      Positive results previously called 12/01/2019  00:11     RITA GLABRATA   ID consult required at [facility].   For susceptibility see order #[id number]   Abnormal    Narrative:     Collection has been rescheduled by VHT at 11/29/2019 11:02 Reason:   patient in bathroom.   Collection has been rescheduled by VHT at 11/29/2019 11:19 Reason:   Unable to collect  Collection has been rescheduled by VHT at 11/29/2019 11:02 Reason:   patient in bathroom.   Collection has been rescheduled by VHT at 11/29/2019 11:19 Reason:   Unable to collect         Assessment and Plan     Hospital Course:    Mr. Luis Hurd was admitted to Hospital Medicine for management of SBP , ARF, and  Decompensated cirrhosis/  liver failure     Active Hospital Problems    Diagnosis  POA    *Spontaneous bacterial peritonitis [K65.2]  Yes    Liver transplant candidate [Z76.82]  Not Applicable    Hyperkalemia [E87.5]  No    Hypotension [I95.9]  No    LORENE (acute kidney injury) [N17.9]  Yes    Infection due to Candida glabrata [B37.9]  Yes    Candidemia [B37.7]  Yes    Acute liver failure without hepatic coma [K72.00]  Yes    Hepatorenal syndrome [K76.7]  Yes    Decompensated hepatic cirrhosis [K72.90]  Yes    Alcoholic cirrhosis of liver with ascites [K70.31]  Yes    Coagulopathy [D68.9]  Yes    Severe sepsis with acute organ dysfunction [A41.9, R65.20]  Yes    Anemia of chronic disease [D63.8]  Yes    Hyponatremia [E87.1]  Yes    Alcoholic hepatitis with ascites [K70.11]  Yes    Severe alcohol dependence [F10.20]  Yes    Acute pancreatitis [K85.90]  Yes    Acute renal failure with tubular necrosis [N17.0]  Yes    Macrocytic anemia [D53.9]  Yes    Moderate malnutrition [E44.0]  Yes    Encounter for pre-transplant evaluation for liver transplant [Z01.818]  Not Applicable      Resolved Hospital Problems   No resolved problems to display.       Severe sepsis with organ dysfunction  SBP  - patient diagnosed on 11/21 and started on rocephin then switched to meropenem at OSH  - on 11/25, would be Day 5 of treatment, Zosyn  Started on presentation at [facility] on 11/25/2019   -  Patient underwent 4L paracentesis 11/26/2019, fluid analysis continues to show evidence of SBP with 850  WBCs in 49% segmented neutrophils.  WBC count in the periphery decreasing to 26 from 35.   -  Follow-up blood cultures- NGTD  -  Continue albumin for SBP treatment  -  MRCP on 11/26/2019 showed no pancreatic abscess,  Hepatic biliary obstruction. showing 1.4 cm pancreatic tail cyst  - will need repeat LVP/ diagnostic paracentesis on 11/29/2019 to ensure clearance of SBP  - p.o. fluconazole added on 11/28, given concern for worsening infection , given  "decompensating kidneys  - repeating diagnostic and therapeutic paracentesis on 11/29/2019 to ensure resolution of SBP- showed decrease in WBC count and resolution of SBP  - s/p  5 days of Zosyn and 5 days of fluconazole  - renally dosed PO cipro ppx for sbp going forward     Sepsis due to Candidemia due to Candida Glabrata  - Blood cx from 11/29 with 4/4 bottles with candida glabrata, resulted on 12/1.  Patient has been on empiric PO fluconazole therapy for possible fungal SBP, fluc stopped.   - source likely intra abdominal   - IV micafungin started on 12/1.   ID consulted.   - already had a TTE on 11/29 which showed no fungal mass  -ophtho consulted- benign eye exam   - ID states 14 days of micafungin from 12/2     Acute liver failure without hepatic coma  Decompensated alcohol cirrhosis with ascites  Alcohol hepatitis with ascites  Severe alcohol dependence   MELD-Na score: 36 at 12/5/2019  5:10 PM  MELD score: 34 at 12/5/2019  5:10 PM  Calculated from:  Serum Creatinine: 12.3 mg/dL (Rounded to 4 mg/dL) at 12/5/2019  5:10 PM  Serum Sodium: 127 mmol/L at 12/5/2019  5:10 PM  Total Bilirubin: 11.8 mg/dL at 12/5/2019  5:10 PM  INR(ratio): 1.5 at 12/5/2019  5:12 AM  Age: 34 years     - patient's last drink about a month ago  Prior to presentation  - viral studies OSH largely negative  - thiamine, folate   - hepatology consulted  - addiction psych consulted   - PETH pending; urine tox + for opioids   - U/S liver with doppler shows "Findings consistent with cirrhosis and sequela of portal hypertension including a large volume of ascites, splenomegaly, and collateral vessel formation"   -  Treat SBP as above  -  Treat renal dysfunction as below  - echo wnl  - obtained financial approval from insurance company for initiation of liver transplant eval- undergoing liver transplant evaluation  - approved for liver transplant and awaiting listing       LORENE  Hepatorenal Syndrome  Hyperkalemia   - was being treated for HRS at " OSH with rise in Cr with SBP. atient also had IV contrast and had large volume paracentesis and does not appear to have gotten albumin after.  Patient also with recent pancreatitis at the outside hospital, which may be contributing to renal dysfunction  -  Creatinine 3.1 on presentation at [facility]  - urine studies- review concerning for H RS  - cont albumin and octreotide   - nephrology consulted  - midodrine started on 11/27, in attempt to hyper perfuse kidneys   - treat SBP and fungemia as above  - monitor urine output closely.  Nephrology is following.  Patient may require dialysis soon, currently no indications for RT at this time. Cr up to 7 on 12/1 12/5- patient needs CRRT today, will transfer to SICU to initiate      Acute pancreatitis - resolved  - due to alcohol   - lipase improved from OSH;  Lipase here 540-->453  - CLD--> advance diet on 11/26  - MRCP 11/26- showed 1.4 cm lesion in the tail of the pancreas as described above.  Considerations include a pancreatic cyst, small pancreatic pseudocysts, or cystic neoplasm. No acute pancreatitis      Coagulopathy due to liver disease  -  Likely due to cirrhosis/  Current decompensation, liver failure  -  fibrinogen 262  - vitamin K for 3 days .  INR trended down to 1.4 with IV vitamin K     Hyponatremia  - fluid restrict . Na 132     Macrocytic anemia  Anemia of chronic disease  - hemoglobin 11-10, . Likely due to alcoholism and liver disease  -  Hemolysis labs reviewed, show some evidence of hemolysis  -  iron studies, ferritin and folate-  Showed evidence of anemia of chronic disease  -  Continue folate  - transfusing 1 unit of PRBC today      Obesity   Severe protein-calorie malnutrition  Body mass index is 36.66 kg/m²  On admission --> Body mass index is 36.6 kg/m².  -  Albumin 2.4, pre-albumin 4. Likely due to alcoholism/  Cirrhosis,  In setting of acute decompensation  -  Advancing diet as tolerated  -  Boost  -  Dietary consult    # Hypotension  -  doing infectious work up; starting patient on midodrine 15 mg PO TID and giving 1 unit of PRBC    Diet:  Low Na with fluid restriction   GI PPx:  PO PPI  DVT PPx:  SCDs due to coagulopathy   Goals of Care:  Full Patient has an Advance directive which was discussed with him and his parents .  Patient has has made wishes of withdrawal of care in the case of end-stage futile measures, if that were ever case.  He is interested in all aggressive measures, full code, transplantation, at this time    High Risk Conditions:  SUGEY, SBP    45 mins of Critical Care time was provided in order to assess and manage the high probability of imminent or life-threatening deterioration of cardio-respiratory status requiring vasodilator support and pending intubation      Disposition:    - patient approved for liver transplant awaiting listing    - transferring patient to SICU today for CRRT and closer monitoring

## 2019-12-06 NOTE — PROGRESS NOTES
Admit Note     Met with patient and mother to assess patients needs due to patient soundly sleeping during some of assessment today.  Patient is a 34 y.o. single male, admitted ESLD; patient activated on liver transplant wait list today.     Patient admitted from outside hospital in [place] on 11/25/2019 .  At this time, patient presents as alert and oriented x 4, good eye contact, communicative and asking and answering questions appropriately.  At this time, patients caregiver presents as alert and oriented x 4, pleasant, good eye contact, well groomed, recall good, concentration/judgement good, average intelligence, calm, communicative, cooperative, asking and answering questions appropriately and taking notes.      Household/Family Systems (as reported by patients caregiver)     Patient resides with patient's mother and father, at [address] [id number].  Patient had been living alone prior to hospitalization, but will be moving in with his parents after transplant.  Support system includes Mother, Lindsay Hurd ([phone]), father Irwin Hurd ([phone]) and weekends only brother George Hurd ([phone]).  Patient does not have dependents that are need of being cared for.     Patients primary caregiver is Lindsay Hurd, patients mother, phone number [phone].  Confirmed patients contact information is [phone] (home);   Telephone Information:   Mobile [phone]   .    During admission, patient's caregiver plans to stay local hotel.  Confirmed patient and patients caregivers do have access to reliable transportation.    Cognitive Status/Learning     Patients caregiver reports patients reading ability as college and states patient does not have difficulty with N/A.  Patients caregiver reports patient learns best by written or verbal information and demonstration.   Needed: No.   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  Patient has 60 hr leave time.  Patient will be able to apply for crisis leave at the end of January.  Patient will be able to keep insurance through end of January.  .Parents will work on crisis leave to be able to extend insurance or other long term plan.    Adherence     Patients caregiver reports patient has a high level of adherence to patients health care regimen.  Adherence counseling and education provided.  Patient's caregiver verbalizes understanding.    Substance Use    Patients caregiver reports patients substance usage as the following:    Tobacco: none, patient denies any use.  Alcohol: Patient last ETOH use 6 weeks ago (end October 2019). Patient was drinking 2-3 bottles of wine a night to relax.  Pt did have 3 months sobriety during 2019 when living iwth his parents, but relapsed when he moved out of their home.  Patient acknowledges that he wants to acheive lifetime sobriety and that he is agreeable to attending an IOP post transplant.   Patient is choosing [facility], resource information provided to patient and mother today.  Patient previously given resource information for IOP in [facility] as well.  [facility], [facility].  Illicit Drugs/Non-prescribed Medications: none, patient denies any use.  Patients caregiver states clear understanding of the potential impact of substance use.  Substance abstinence/cessation counseling, education and resources provided and reviewed.     Services Utilizing/ADLS (as reported by patients caregiver)    Infusion Service: Prior to admission, patient utilizing? no  Home Health: Prior to admission, patient utilizing? no , in past used [facility] (2yrs  ago)  DME: Prior to admission, no  Pulmonary/Cardiac Rehab: Prior to admission, no  Dialysis:  Prior to admission, no  Transplant Specialty Pharmacy:  Prior to admission, no.    Prior to admission, patients caregiver reports patient was independent with ADLS and was driving.  Since hospitalization in last 2.5 weeks, patient has required assistance with ADL's.   Patients caregiver reports patient is not able to care for self at this time due to compromised medical condition (as documented in medical record) and physical weakness..  Patients caregiver reports patient indicates a willingness to care for self once medically cleared to do so.    Insurance/Medications    Insured by   Payor/Plan Subscr  Sex Relation Sub. Ins. ID Effective Group Num   1. [employer] -* [place] 1985 Male  [id number] 3/1/19 [employer]                                   [address] [address]   2. MEDICAID - * [place] 1985 Male  [id number]                                   [address] [address]      Primary Insurance (for UNOS reporting): Private Insurance- [employer]   Secondary Insurance (for UNOS reporting): None    Patients caregiver reports patient is able to obtain and afford medications at this time and at time of discharge.    Living Will/Healthcare Power of     Patients caregiver reports patient has a LW and/or HCPA.  Patient's parents are HCPA, this information should be on file with nurses.  provided education regarding LW and HCPA and the completion of forms.    Coping/Mental Health (as reported by patients caregiver)    Patient is coping well with the aid of  family members and negrita. Patient had reported mild depression in past and reports taking illness today one day at a time.  He negro with support of family and prayer.  Patients caregiver is coping adequately with the aid of  family members, friends and negrita.       Discharge Planning (as reported  by patients caregiver)    At time of discharge, patient plans to return to parents home or local lodging under the care of parents, Lindsay or Irwin Hurd.  Patients parents will transport patient.  Per rounds today, expected discharge date has not been medically determined at this time. Patients caretaker verbalizes understanding and is involved in treatment planning and discharge process.    Additional Concerns    Patient's caretaker denies additional needs and/or concerns at this time. Patient is being followed for needs, education, resources, information, emotional support, supportive counseling, and for supportive and skilled discharge plan of care.  providing ongoing psychosocial support, education, resources and d/c planning as needed.  SW remains available. Patient's caregiver verbalizes understanding and agreement with information reviewed,  availability and how to access available resources as needed. Patient denies additional needs and/or concerns at this time. Patient verbalizes understanding and agreement with information reviewed, social work availability, and how to access available resources as needed.

## 2019-12-06 NOTE — NURSING
MD notified of episode of dry heaving and nausea with a scant amount of bloody drainage noted in saliva. Blood pressure dropped after this episode. Levophed titrated up and orders put in to obtain a KUB and start Vasopressin. Will carry out and will continue to monitor.

## 2019-12-06 NOTE — SUBJECTIVE & OBJECTIVE
Interval History:   - transferred to ICU yesterday.  - on low-dose Levophed, vasopressin, map around 65  - HD line placed and underwent CRT yesterday  - reports he feels approximately same from yesterday though did feel better after dialysis  - denies abdominal pain, chest pain or other complaints.  Denies confusion.    Current Facility-Administered Medications   Medication    0.9%  NaCl infusion (CRRT USE ONLY)    acetaminophen tablet 325 mg    bisacodyl suppository 10 mg    calcium carbonate 200 mg calcium (500 mg) chewable tablet 500 mg    cefTRIAXone injection 1 g    dextrose 10% (D10W) Bolus    dextrose 10% (D10W) Bolus    folic acid tablet 1 mg    glucagon (human recombinant) injection 1 mg    glucose chewable tablet 16 g    glucose chewable tablet 24 g    hepatitis B (HEPLISAV-B) 20 mcg/0.5 mL vaccine 0.5 mL    magnesium sulfate 2g in water 50mL IVPB (premix)    micafungin 100 mg in sodium chloride 0.9 % 100 mL IVPB (ready to mix system)    midodrine tablet 15 mg    norepinephrine 4 mg in dextrose 5% 250 mL infusion (premix) (titrating)    ondansetron injection 4 mg    oxyCODONE immediate release tablet 5 mg    polyethylene glycol packet 17 g    promethazine (PHENERGAN) 12.5 mg in dextrose 5 % 50 mL IVPB    senna-docusate 8.6-50 mg per tablet 1 tablet    sodium chloride 0.9% flush 10 mL    sodium phosphate 20.01 mmol in dextrose 5 % 250 mL IVPB    sodium phosphate 30 mmol in dextrose 5 % 250 mL IVPB    sodium phosphate 39.99 mmol in dextrose 5 % 250 mL IVPB    thiamine tablet 100 mg    traMADol tablet 50 mg    vasopressin (PITRESSIN) 0.2 Units/mL in dextrose 5 % 100 mL infusion       Objective:     Vital Signs (Most Recent):  Temp: 97.6 °F (36.4 °C) (12/06/19 1101)  Pulse: 89 (12/06/19 1300)  Resp: 17 (12/06/19 1300)  BP: (!) 109/53 (12/06/19 1300)  SpO2: (!) 92 % (12/06/19 1300) Vital Signs (24h Range):  Temp:  [97.5 °F (36.4 °C)-98 °F (36.7 °C)] 97.6 °F (36.4 °C)  Pulse:  [84-99]  89  Resp:  [11-55] 17  SpO2:  [91 %-100 %] 92 %  BP: ()/(42-83) 109/53  Arterial Line BP: ()/(25-52) 91/30     Weight: 129.3 kg (285 lb 0.9 oz) (12/04/19 1000)  Body mass index is 36.6 kg/m².    Physical Exam   Constitutional: He is oriented to person, place, and time. He appears well-nourished. No distress.   In cardiac chair, weak appearing but improved from prior day, no distress.   HENT:   Mouth/Throat: Oropharynx is clear and moist.   Eyes: Scleral icterus is present.   Pulmonary/Chest: Effort normal. No respiratory distress.   Abdominal: Soft. He exhibits distension. There is no tenderness.   Soft, nondistended or tender.   Musculoskeletal: He exhibits edema. He exhibits no tenderness.   Neurological: He is alert and oriented to person, place, and time.   Skin: Skin is warm. Capillary refill takes less than 2 seconds. He is not diaphoretic.   Psychiatric: He has a normal mood and affect.   Nursing note and vitals reviewed.      MELD-Na score: 35 at 12/6/2019  3:00 AM  MELD score: 35 at 12/6/2019  3:00 AM  Calculated from:  Serum Creatinine: 4.4 mg/dL (Rounded to 4 mg/dL) at 12/6/2019  3:00 AM  Serum Sodium: 137 mmol/L at 12/6/2019  3:00 AM  Total Bilirubin: 12.2 mg/dL at 12/6/2019  3:00 AM  INR(ratio): 1.7 at 12/6/2019  3:00 AM  Age: 34 years    Significant Labs:  Labs within the past month have been reviewed.    Significant Imaging:  Labs: Reviewed

## 2019-12-06 NOTE — NURSING
MD notified of patient's change in heart rate from 90s NSR to 160s and irregular with a noticeable drop in blood pressure. Patient sustained this rhythm for a minute. EKG ordered and labs reviewed. Will obtain and will continue to monitor.

## 2019-12-06 NOTE — PROGRESS NOTES
"[facility]  Hepatology  Progress Note    Patient Name: Luis Hurd  MRN: [id number]  Admission Date: 11/25/2019  Hospital Length of Stay: 11 days  Attending Provider: Eugenio Nettles MD   Primary Care Physician: William Nettles MD  Principal Problem:Spontaneous bacterial peritonitis    Subjective:     Transplant status: Pre-transplant    HPI: Patient is a 34-year-old male with past medical history of alcohol abuse, newly diagnosed alcoholic cirrhosis with ascites, history of morbid obesity, who was admitted to outside hospital in 1 week ago for abdominal pain and lower extremity edema.  Despite initial evaluation consistent with SBP and a pancreatitis, the patient did not improve with IV ceftriaxone or pentoxifylline with worsening leukocytosis and kidney function.  He was transferred to [facility] for further evaluation.    Patient states that he drinks 2-3 bottles of wine every night after coming back from work, and has been doing that for a long time.  He stopped about a month ago as "it was not making him feel good".  Denies being to rehab, or having issues at work related to his alcohol use.  Endorses single DUI in the past.  Denies IV drug use, history of viral hepatitis, or history of liver disease. He was told that his liver enzymes were elevated in the past, but was never fully counseled to stop alcohol.  He was not on any medications prior to this admission.    On evaluation at outside hospital, the patient was hemodynamically stable on evaluation.  A paracentesis was done and was consistent with SBP, with PMNs around 1000.  Lipase was elevated at 700.  A CT scan of the abdomen with contrast on 11/21 was consistent with acute pancreatitis with possible early pseudocyst.  He also had features of cirrhosis with ascites. He was started on pentoxifylline, IV ceftriaxone.  During hospitalization his white count worsened to 27 from 10, creatinine 3.1 from normal, and worsening " bilirubin.  He was started on hepatorenal syndrome treatment, however with no improvement.  He was switched to meropenem prior to transfer.    Today the patient is feeling well, and endorses overall improvement of his abdominal pain.  Denies fever, chills, nausea, vomiting, change in bowel habits.  He is hemodynamically stable however mildly tachycardic.  Afebrile.  He underwent paracentesis, with consistent with improving SBP.  Of note ascitic fluid amylase was elevated at 2000 at outside hospital.    Interval History:   - transferred to ICU yesterday.  - on low-dose Levophed, vasopressin, map around 65  - HD line placed and underwent CRT yesterday  - reports he feels approximately same from yesterday though did feel better after dialysis  - denies abdominal pain, chest pain or other complaints.  Denies confusion.    Current Facility-Administered Medications   Medication    0.9%  NaCl infusion (CRRT USE ONLY)    acetaminophen tablet 325 mg    bisacodyl suppository 10 mg    calcium carbonate 200 mg calcium (500 mg) chewable tablet 500 mg    cefTRIAXone injection 1 g    dextrose 10% (D10W) Bolus    dextrose 10% (D10W) Bolus    folic acid tablet 1 mg    glucagon (human recombinant) injection 1 mg    glucose chewable tablet 16 g    glucose chewable tablet 24 g    hepatitis B (HEPLISAV-B) 20 mcg/0.5 mL vaccine 0.5 mL    magnesium sulfate 2g in water 50mL IVPB (premix)    micafungin 100 mg in sodium chloride 0.9 % 100 mL IVPB (ready to mix system)    midodrine tablet 15 mg    norepinephrine 4 mg in dextrose 5% 250 mL infusion (premix) (titrating)    ondansetron injection 4 mg    oxyCODONE immediate release tablet 5 mg    polyethylene glycol packet 17 g    promethazine (PHENERGAN) 12.5 mg in dextrose 5 % 50 mL IVPB    senna-docusate 8.6-50 mg per tablet 1 tablet    sodium chloride 0.9% flush 10 mL    sodium phosphate 20.01 mmol in dextrose 5 % 250 mL IVPB    sodium phosphate 30 mmol in dextrose 5 %  250 mL IVPB    sodium phosphate 39.99 mmol in dextrose 5 % 250 mL IVPB    thiamine tablet 100 mg    traMADol tablet 50 mg    vasopressin (PITRESSIN) 0.2 Units/mL in dextrose 5 % 100 mL infusion       Objective:     Vital Signs (Most Recent):  Temp: 97.6 °F (36.4 °C) (12/06/19 1101)  Pulse: 89 (12/06/19 1300)  Resp: 17 (12/06/19 1300)  BP: (!) 109/53 (12/06/19 1300)  SpO2: (!) 92 % (12/06/19 1300) Vital Signs (24h Range):  Temp:  [97.5 °F (36.4 °C)-98 °F (36.7 °C)] 97.6 °F (36.4 °C)  Pulse:  [84-99] 89  Resp:  [11-55] 17  SpO2:  [91 %-100 %] 92 %  BP: ()/(42-83) 109/53  Arterial Line BP: ()/(25-52) 91/30     Weight: 129.3 kg (285 lb 0.9 oz) (12/04/19 1000)  Body mass index is 36.6 kg/m².    Physical Exam   Constitutional: He is oriented to person, place, and time. He appears well-nourished. No distress.   In cardiac chair, weak appearing but improved from prior day, no distress.   HENT:   Mouth/Throat: Oropharynx is clear and moist.   Eyes: Scleral icterus is present.   Pulmonary/Chest: Effort normal. No respiratory distress.   Abdominal: Soft. He exhibits distension. There is no tenderness.   Soft, nondistended or tender.   Musculoskeletal: He exhibits edema. He exhibits no tenderness.   Neurological: He is alert and oriented to person, place, and time.   Skin: Skin is warm. Capillary refill takes less than 2 seconds. He is not diaphoretic.   Psychiatric: He has a normal mood and affect.   Nursing note and vitals reviewed.      MELD-Na score: 35 at 12/6/2019  3:00 AM  MELD score: 35 at 12/6/2019  3:00 AM  Calculated from:  Serum Creatinine: 4.4 mg/dL (Rounded to 4 mg/dL) at 12/6/2019  3:00 AM  Serum Sodium: 137 mmol/L at 12/6/2019  3:00 AM  Total Bilirubin: 12.2 mg/dL at 12/6/2019  3:00 AM  INR(ratio): 1.7 at 12/6/2019  3:00 AM  Age: 34 years    Significant Labs:  Labs within the past month have been reviewed.    Significant Imaging:  Labs: Reviewed    Assessment/Plan:     Decompensated hepatic  cirrhosis  Patient is a 34-year-old male with history of alcohol abuse in new diagnosis of alcoholic cirrhosis who presented with SBP and pancreatitis, worsening despite treatment with IV antibiotics.  Repeat infx workup notable for fungemia currently on treatment and undergoing transplant evaluation. Renal failure progressively worsening since admission and started on dialysis.  Completed evaluation, approved for listing.  Transfer to ICU on 12/05 due to hypotension and clinical worsening.  Currently stable on low-dose vasopressor with negative repeat infectious workup.    Plan  -care per ICU team, input appreciated  - SBP. Completed antibiotics, negative para 12/3.  repeat paracentesis  - fungemia likely 2/2 GI translocation. On micafungin. Negative optho examination. Negative echo. To complete 2 week of therapy on 12/17.  Repeat cultures negative  -LORENE, likely HRS.  On CRT.  Appreciate nephrology input  -varices screening:  No EGD in the past  -hepatocellular carcinoma screening:  CTAP and RUQ neg. AFP WNL  - encouraged PT and nutrition  -transplant candidacy:  For listing today          Thank you for your consult. I will follow-up with patient. Please contact us if you have any additional questions.    Keenan Apple MD  Hepatology  [facility]

## 2019-12-06 NOTE — ASSESSMENT & PLAN NOTE
33 yo male with decompensated alcoholic cirrhosis on LTS in SICU for hemodynamic stabilization and CRRT in the setting of ARF 2/2 decompensated liver failure.  Possible transplant list candidate.      Neuro  -AOx3  -No acute issues  -Normal mentation    Cardio  -Pressor requirements of 0.08 levo / 0.04 vaso.  -Continue invasive BP monitoring.  -Wean pressors AT  -Attempt to achieve euvolemia.      Pulm  -Orthopnea, likely 2/2 relative fluid overload  -CRRT.  O2 via NC if needed.    SHANIKA  # Decompensated alcohol cirrhosis with ascites  # Alcohol hepatitis with ascites  # Severe alcohol dependence   MELD-Na score: 34 at 11/25/2019 10:07 PM  MELD score: 32 at 11/25/2019 10:07 PM  - patient's last drink about a month ago  - hepatology consulted  - addiction psych consulted   - PETH pending; urine tox  - getting U/S liver with doppler  - viral studies OSH largely negative  - Likely to be listed as liver transplant candidate.     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      Renal     # Hyponatremia  - Continue to monitor, daily bmp  - CRRT     # Anemia of chronic disease  - iron studies, ferritin and folate       Diet:  CLD  DVT PPx:  Heparin   Goals of Care:  full

## 2019-12-07 NOTE — SUBJECTIVE & OBJECTIVE
Past Medical History:   Diagnosis Date    Acute pancreatitis 11/26/2019    Alcoholic cirrhosis of liver with ascites 11/26/2019    Severe alcohol dependence 11/26/2019    Spontaneous bacterial peritonitis 11/25/2019       History reviewed. No pertinent surgical history.    Review of patient's allergies indicates:  No Known Allergies    Family History     None        Tobacco Use    Smoking status: Never Smoker    Smokeless tobacco: Never Used   Substance and Sexual Activity    Alcohol use: Not on file    Drug use: Not on file    Sexual activity: Not on file      Review of Systems   Constitutional: Positive for fatigue. Negative for chills and fever.   HENT: Negative.    Respiratory: Positive for shortness of breath. Negative for cough and chest tightness.    Cardiovascular: Positive for leg swelling. Negative for chest pain.   Gastrointestinal: Positive for abdominal distention and abdominal pain. Negative for constipation, nausea and vomiting.   Endocrine: Negative.    Genitourinary: Negative.    Musculoskeletal: Negative.    Skin: Positive for color change.   Allergic/Immunologic: Negative.    Neurological: Negative.      Objective:     Vital Signs (Most Recent):  Temp: 97 °F (36.1 °C) (12/07/19 1045)  Pulse: 96 (12/07/19 1045)  Resp: (!) 23 (12/07/19 1045)  BP: (!) 91/55 (12/07/19 1045)  SpO2: 97 % (12/07/19 1045) Vital Signs (24h Range):  Temp:  [93.4 °F (34.1 °C)-97.9 °F (36.6 °C)] 97 °F (36.1 °C)  Pulse:  [] 96  Resp:  [14-27] 23  SpO2:  [92 %-99 %] 97 %  BP: ()/(46-78) 91/55  Arterial Line BP: ()/(30-50) 84/37     Weight: 129.3 kg (285 lb 0.9 oz)  Body mass index is 36.6 kg/m².      Intake/Output Summary (Last 24 hours) at 12/7/2019 1102  Last data filed at 12/7/2019 1045  Gross per 24 hour   Intake 1475.24 ml   Output 25 ml   Net 1450.24 ml       Physical Exam   Constitutional: He is oriented to person, place, and time. No distress.   HENT:   Head: Normocephalic.   Eyes: Pupils  are equal, round, and reactive to light. Scleral icterus is present.   Neck: Neck supple.   Cardiovascular: Normal rate, regular rhythm, normal heart sounds and intact distal pulses.   Pulmonary/Chest: Effort normal and breath sounds normal. No respiratory distress.   Abdominal: He exhibits distension. There is tenderness. There is no rebound and no guarding.   Neurological: He is alert and oriented to person, place, and time.   Skin: Skin is warm and dry. He is not diaphoretic.   Psychiatric: He has a normal mood and affect. His behavior is normal.   Nursing note and vitals reviewed.      Vents:       Lines/Drains/Airways     Central Venous Catheter Line                 Trialysis (Dialysis) Catheter 12/05/19 1429 right internal jugular 1 day          Arterial Line                 Arterial Line 12/05/19 1544 Right Radial 1 day          Peripheral Intravenous Line                 Peripheral IV - Single Lumen 12/03/19 1100 18 G Anterior;Right Upper Arm 4 days                Significant Labs:    CBC/Anemia Profile:  Recent Labs   Lab 12/06/19  0300 12/07/19  0307 12/07/19  0430   WBC 31.37* 40.33* 40.04*   HGB 7.4* 5.7* 5.8*   HCT 22.9* 18.6* 18.1*   * 90* 90*   * 115* 114*   RDW 19.7* 22.4* 23.0*        Chemistries:  Recent Labs   Lab 12/05/19  1710  12/06/19  0300 12/06/19  1429 12/07/19  0307   *   < > 137  137 131* 135*   K 6.2*   < > 4.1  4.1 4.7 5.0   CL 91*   < > 94*  94* 94* 95   CO2 15*   < > 25  25 19* 16*   *   < > 53*  53* 62* 74*   CREATININE 12.3*   < > 4.4*  4.4* 5.3* 5.9*   CALCIUM 8.5*   < > 8.3*  8.3* 8.6* 9.1   ALBUMIN 2.8*   < > 3.2*  3.2* 2.7* 2.5*   PROT 5.3*  --  5.8*  --  4.6*   BILITOT 11.8*  --  12.2*  --  11.7*   ALKPHOS 289*  --  329*  --  301*   ALT 27  --  31  --  33   *  --  143*  --  163*   MG  --    < > 2.3  2.3 2.5 2.6   PHOS  --    < > 3.8  3.8 5.6* 6.3*    < > = values in this interval not displayed.       All pertinent labs within the past  24 hours have been reviewed.    Significant Imaging: I have reviewed all pertinent imaging results/findings within the past 24 hours.

## 2019-12-07 NOTE — SUBJECTIVE & OBJECTIVE
Interval History: Patient more confused this AM, H/H significantly decreased. Anuric.     Review of patient's allergies indicates:  No Known Allergies  Current Facility-Administered Medications   Medication Frequency    acetaminophen tablet 325 mg Q4H PRN    bisacodyl suppository 10 mg Daily PRN    calcium carbonate 200 mg calcium (500 mg) chewable tablet 500 mg TID PRN    cefTRIAXone injection 1 g Q24H    dextrose 10% (D10W) Bolus PRN    dextrose 10% (D10W) Bolus PRN    folic acid tablet 1 mg Daily    glucagon (human recombinant) injection 1 mg PRN    glucose chewable tablet 16 g PRN    glucose chewable tablet 24 g PRN    hepatitis B (HEPLISAV-B) 20 mcg/0.5 mL vaccine 0.5 mL vaccine x 1 dose    micafungin 100 mg in sodium chloride 0.9 % 100 mL IVPB (ready to mix system) Q24H    midodrine tablet 15 mg TID    norepinephrine 4 mg in dextrose 5% 250 mL infusion (premix) (titrating) Continuous    ondansetron injection 4 mg Q6H PRN    oxyCODONE immediate release tablet 5 mg Q6H PRN    polyethylene glycol packet 17 g Daily    promethazine (PHENERGAN) 12.5 mg in dextrose 5 % 50 mL IVPB Q6H PRN    senna-docusate 8.6-50 mg per tablet 1 tablet BID    sodium chloride 0.9% flush 10 mL PRN    thiamine tablet 100 mg Daily    traMADol tablet 50 mg Q6H PRN    vasopressin (PITRESSIN) 0.2 Units/mL in dextrose 5 % 100 mL infusion Continuous       Objective:     Vital Signs (Most Recent):  Temp: 97.2 °F (36.2 °C) (12/07/19 0915)  Pulse: 96 (12/07/19 0915)  Resp: (!) 24 (12/07/19 0915)  BP: (!) 103/51 (12/07/19 0915)  SpO2: 98 % (12/07/19 0915)  O2 Device (Oxygen Therapy): room air (12/07/19 0915) Vital Signs (24h Range):  Temp:  [93.4 °F (34.1 °C)-97.9 °F (36.6 °C)] 97.2 °F (36.2 °C)  Pulse:  [] 96  Resp:  [11-27] 24  SpO2:  [92 %-99 %] 98 %  BP: ()/(46-83) 103/51  Arterial Line BP: ()/(30-50) 75/34     Weight: 129.3 kg (285 lb 0.9 oz) (12/04/19 1000)  Body mass index is 36.6 kg/m².  Body  surface area is 2.6 meters squared.    I/O last 3 completed shifts:  In: 2709.8 [P.O.:600; I.V.:1346.2; Blood:713.6; IV Piggyback:50]  Out: 2679 [Urine:25; Other:2654]    Physical Exam   Constitutional: He appears well-developed and well-nourished.   HENT:   Head: Normocephalic and atraumatic.   Cardiovascular: Normal rate and regular rhythm.   Pulmonary/Chest: Effort normal and breath sounds normal.   Abdominal: Soft. He exhibits distension.   Musculoskeletal: Normal range of motion. He exhibits no edema.   Neurological: He is alert.   Skin: Skin is warm and dry.   Vitals reviewed.      Significant Labs:  CBC:   Recent Labs   Lab 12/07/19  0430   WBC 40.04*   RBC 1.59*   HGB 5.8*   HCT 18.1*   PLT 90*   *   MCH 36.5*   MCHC 32.0     CMP:   Recent Labs   Lab 12/07/19  0307   *   CALCIUM 9.1   ALBUMIN 2.5*   PROT 4.6*   *   K 5.0   CO2 16*   CL 95   BUN 74*   CREATININE 5.9*   ALKPHOS 301*   ALT 33   *   BILITOT 11.7*     All labs within the past 24 hours have been reviewed.

## 2019-12-07 NOTE — PROGRESS NOTES
[facility]  Critical Care - Surgery  Progress Note    Patient Name: Luis Hurd  MRN: [id number]  Admission Date: 11/25/2019  Hospital Length of Stay: 12 days  Code Status: Full Code  Attending Provider: Eugenio Nettles MD  Primary Care Provider: William Nettles MD   Principal Problem: Spontaneous bacterial peritonitis    Subjective:     Hospital/ICU Course:  Pt arrived from OSH.  Newly diagnosed alcoholic cirrhosis, pt accepted to SICU with LTS as primary.  Renal function declining.  RIJ trialysis catheter placed with plan for patient to possibly receive CRRT evening of 12/5/19.      12/6/19 - Pt received CRRT overnight, requiring pressor support at this time.  Otherwise doing well, possibly being put on transplant list today.      12/7 - Overnight patient had blood leak from his arterial line angiocatheter.  Hardware reconnected with no evidence of continued leak.  Patient became hypothermic to 93F shortly after.  Mentation has decreased from baseline.           Past Medical History:   Diagnosis Date    Acute pancreatitis 11/26/2019    Alcoholic cirrhosis of liver with ascites 11/26/2019    Severe alcohol dependence 11/26/2019    Spontaneous bacterial peritonitis 11/25/2019       History reviewed. No pertinent surgical history.    Review of patient's allergies indicates:  No Known Allergies    Family History     None        Tobacco Use    Smoking status: Never Smoker    Smokeless tobacco: Never Used   Substance and Sexual Activity    Alcohol use: Not on file    Drug use: Not on file    Sexual activity: Not on file      Review of Systems   Constitutional: Positive for fatigue. Negative for chills and fever.   HENT: Negative.    Respiratory: Positive for shortness of breath. Negative for cough and chest tightness.    Cardiovascular: Positive for leg swelling. Negative for chest pain.   Gastrointestinal: Positive for abdominal distention and abdominal pain. Negative for constipation,  nausea and vomiting.   Endocrine: Negative.    Genitourinary: Negative.    Musculoskeletal: Negative.    Skin: Positive for color change.   Allergic/Immunologic: Negative.    Neurological: Negative.      Objective:     Vital Signs (Most Recent):  Temp: 97 °F (36.1 °C) (12/07/19 1045)  Pulse: 96 (12/07/19 1045)  Resp: (!) 23 (12/07/19 1045)  BP: (!) 91/55 (12/07/19 1045)  SpO2: 97 % (12/07/19 1045) Vital Signs (24h Range):  Temp:  [93.4 °F (34.1 °C)-97.9 °F (36.6 °C)] 97 °F (36.1 °C)  Pulse:  [] 96  Resp:  [14-27] 23  SpO2:  [92 %-99 %] 97 %  BP: ()/(46-78) 91/55  Arterial Line BP: ()/(30-50) 84/37     Weight: 129.3 kg (285 lb 0.9 oz)  Body mass index is 36.6 kg/m².      Intake/Output Summary (Last 24 hours) at 12/7/2019 1102  Last data filed at 12/7/2019 1045  Gross per 24 hour   Intake 1475.24 ml   Output 25 ml   Net 1450.24 ml       Physical Exam   Constitutional: He is oriented to person, place, and time. No distress.   HENT:   Head: Normocephalic.   Eyes: Pupils are equal, round, and reactive to light. Scleral icterus is present.   Neck: Neck supple.   Cardiovascular: Normal rate, regular rhythm, normal heart sounds and intact distal pulses.   Pulmonary/Chest: Effort normal and breath sounds normal. No respiratory distress.   Abdominal: He exhibits distension. There is tenderness. There is no rebound and no guarding.   Neurological: He is alert and oriented to person, place, and time.   Skin: Skin is warm and dry. He is not diaphoretic.   Psychiatric: He has a normal mood and affect. His behavior is normal.   Nursing note and vitals reviewed.      Vents:       Lines/Drains/Airways     Central Venous Catheter Line                 Trialysis (Dialysis) Catheter 12/05/19 1429 right internal jugular 1 day          Arterial Line                 Arterial Line 12/05/19 1544 Right Radial 1 day          Peripheral Intravenous Line                 Peripheral IV - Single Lumen 12/03/19 1100 18 G  Anterior;Right Upper Arm 4 days                Significant Labs:    CBC/Anemia Profile:  Recent Labs   Lab 12/06/19  0300 12/07/19  0307 12/07/19  0430   WBC 31.37* 40.33* 40.04*   HGB 7.4* 5.7* 5.8*   HCT 22.9* 18.6* 18.1*   * 90* 90*   * 115* 114*   RDW 19.7* 22.4* 23.0*        Chemistries:  Recent Labs   Lab 12/05/19  1710  12/06/19  0300 12/06/19  1429 12/07/19  0307   *   < > 137  137 131* 135*   K 6.2*   < > 4.1  4.1 4.7 5.0   CL 91*   < > 94*  94* 94* 95   CO2 15*   < > 25  25 19* 16*   *   < > 53*  53* 62* 74*   CREATININE 12.3*   < > 4.4*  4.4* 5.3* 5.9*   CALCIUM 8.5*   < > 8.3*  8.3* 8.6* 9.1   ALBUMIN 2.8*   < > 3.2*  3.2* 2.7* 2.5*   PROT 5.3*  --  5.8*  --  4.6*   BILITOT 11.8*  --  12.2*  --  11.7*   ALKPHOS 289*  --  329*  --  301*   ALT 27  --  31  --  33   *  --  143*  --  163*   MG  --    < > 2.3  2.3 2.5 2.6   PHOS  --    < > 3.8  3.8 5.6* 6.3*    < > = values in this interval not displayed.       All pertinent labs within the past 24 hours have been reviewed.    Significant Imaging: I have reviewed all pertinent imaging results/findings within the past 24 hours.    Assessment/Plan:     33 yo male with decompensated alcoholic cirrhosis on LTS in SICU for hemodynamic stabilization and CRRT in the setting of ARF 2/2 decompensated liver failure.  Possible transplant list candidate.      Neuro  -Mentation acutely altered  -Obtain abg, lactate, cbc, ammonia    Cardio  -Pressor requirements of 0.16 levo / 0.04 vaso.  -Continue invasive BP monitoring.  -Wean pressors AT  -Attempt to achieve euvolemia.      Pulm  -Orthopnea, improving  -CRRT.  O2 via NC if needed.    SHANIKA  # Decompensated alcohol cirrhosis with ascites  # Alcohol hepatitis with ascites  # Severe alcohol dependence   MELD-Na score: 34 at 11/25/2019 10:07 PM  MELD score: 32 at 11/25/2019 10:07 PM  - patient's last drink about a month ago  - hepatology consulted  - addiction psych consulted   -  PETH pending; urine tox  - getting U/S liver with doppler  - viral studies OSH largely negative  - Likely to be listed as liver transplant candidate.     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     #Leukocytosis - alcoholic cirrhosis vs. Early sepsis  -ID following  -repeat cultures pending     #Fungemia  -On micafungin, repeat cultures     Renal     # Hyponatremia  - Continue to monitor, daily bmp  - CRRT     # Anemia of chronic disease  - iron studies, ferritin and folate       Diet:  CLD  DVT PPx:  Heparin   Goals of Care:  full    Critical secondary to Patient has a condition that poses threat to life and bodily function: Decompensated Hepatic failure        Pedro Jacobsen,   Critical Care - Surgery  [facility]

## 2019-12-07 NOTE — ASSESSMENT & PLAN NOTE
Patient is a 34-year-old male with history of alcohol abuse in new diagnosis of alcoholic cirrhosis who presented with SBP and pancreatitis, worsening despite treatment with IV antibiotics.  Repeat infx workup notable for fungemia currently on treatment and undergoing transplant evaluation. Renal failure progressively worsening since admission and started on dialysis.  Completed evaluation, approved for listing.  Transfer to ICU on 12/05 due to hypotension and clinical worsening.  Currently stable on low-dose vasopressor with negative repeat infectious workup.      Recommendation(s)  12/7/19: Patient is not doing well, progressively getting sicker. He was hypothermic to 93F last night. 12/3 blood cutlure growing budding yeast (he was already on micafungin at that time), however 12/5 blood culture so far negative. He is requiring 2 vasopressors for blood pressure support, likely septic/distributive shock. He is anemic today and getting blood transfusion, no overt signs of GI bleed. He is confused, not oriented to time/person/place.    - ID reconsulted  - regrowth of yeast on 12/3 was concerning  - patient is in shock and is medically too high risk for liver transplant at this moment  - continue supportive care with vasopressor, anti-fungal    Discussed with Critical Care team and LTS (Dr. Nettles). Informed patient's parents.    Prognosis is guarded.    Girish Toussaint MD  Staff Physician  Hepatology and Liver Transplant  [facility]  [facility] [place]

## 2019-12-07 NOTE — PROGRESS NOTES
"[facility]  Hepatology  Progress Note    Patient Name: Luis Hurd  MRN: [id number]  Admission Date: 11/25/2019  Hospital Length of Stay: 12 days  Attending Provider: Eugenio Nettles MD   Primary Care Physician: William Nettles MD  Principal Problem:Spontaneous bacterial peritonitis    Subjective:     Transplant status: Pre-transplant    HPI: Patient is a 34-year-old male with past medical history of alcohol abuse, newly diagnosed alcoholic cirrhosis with ascites, history of morbid obesity, who was admitted to outside hospital in 1 week ago for abdominal pain and lower extremity edema.  Despite initial evaluation consistent with SBP and a pancreatitis, the patient did not improve with IV ceftriaxone or pentoxifylline with worsening leukocytosis and kidney function.  He was transferred to [facility] for further evaluation.    Patient states that he drinks 2-3 bottles of wine every night after coming back from work, and has been doing that for a long time.  He stopped about a month ago as "it was not making him feel good".  Denies being to rehab, or having issues at work related to his alcohol use.  Endorses single DUI in the past.  Denies IV drug use, history of viral hepatitis, or history of liver disease. He was told that his liver enzymes were elevated in the past, but was never fully counseled to stop alcohol.  He was not on any medications prior to this admission.    On evaluation at outside hospital, the patient was hemodynamically stable on evaluation.  A paracentesis was done and was consistent with SBP, with PMNs around 1000.  Lipase was elevated at 700.  A CT scan of the abdomen with contrast on 11/21 was consistent with acute pancreatitis with possible early pseudocyst.  He also had features of cirrhosis with ascites. He was started on pentoxifylline, IV ceftriaxone.  During hospitalization his white count worsened to 27 from 10, creatinine 3.1 from normal, and worsening " bilirubin.  He was started on hepatorenal syndrome treatment, however with no improvement.  He was switched to meropenem prior to transfer.        Interval History:   12/7/19: Patient is not doing well, progressively getting sicker. He was hypothermic to 93F last night. 12/3 blood cutlure growing budding yeast (he was already on micafungin at that time), however 12/5 blood culture so far negative. He is requiring 2 vasopressors for blood pressure support, likely septic/distributive shock. He is anemic today and getting blood transfusion, no overt signs of GI bleed. He is confused, not oriented to time/person/place.      Current Facility-Administered Medications   Medication    0.9%  NaCl infusion (CRRT USE ONLY)    acetaminophen tablet 325 mg    bisacodyl suppository 10 mg    calcium carbonate 200 mg calcium (500 mg) chewable tablet 500 mg    cefTRIAXone injection 1 g    dextrose 10% (D10W) Bolus    dextrose 10% (D10W) Bolus    folic acid tablet 1 mg    glucagon (human recombinant) injection 1 mg    glucose chewable tablet 16 g    glucose chewable tablet 24 g    hepatitis B (HEPLISAV-B) 20 mcg/0.5 mL vaccine 0.5 mL    magnesium sulfate 2g in water 50mL IVPB (premix)    micafungin 100 mg in sodium chloride 0.9 % 100 mL IVPB (ready to mix system)    midodrine tablet 15 mg    norepinephrine 4 mg in dextrose 5% 250 mL infusion (premix) (titrating)    ondansetron injection 4 mg    oxyCODONE immediate release tablet 5 mg    polyethylene glycol packet 17 g    promethazine (PHENERGAN) 12.5 mg in dextrose 5 % 50 mL IVPB    senna-docusate 8.6-50 mg per tablet 1 tablet    sodium chloride 0.9% flush 10 mL    sodium phosphate 20.01 mmol in dextrose 5 % 250 mL IVPB    sodium phosphate 30 mmol in dextrose 5 % 250 mL IVPB    sodium phosphate 39.99 mmol in dextrose 5 % 250 mL IVPB    thiamine tablet 100 mg    traMADol tablet 50 mg    vasopressin (PITRESSIN) 0.2 Units/mL in dextrose 5 % 100 mL infusion        Objective:     Vital Signs (Most Recent):  Temp: 97 °F (36.1 °C) (12/07/19 1145)  Pulse: 95 (12/07/19 1145)  Resp: 19 (12/07/19 1145)  BP: 122/60 (12/07/19 1130)  SpO2: 100 % (12/07/19 1145) Vital Signs (24h Range):  Temp:  [93.4 °F (34.1 °C)-97.9 °F (36.6 °C)] 97 °F (36.1 °C)  Pulse:  [] 95  Resp:  [14-27] 19  SpO2:  [92 %-100 %] 100 %  BP: ()/(42-78) 122/60  Arterial Line BP: ()/(30-50) 94/45     Weight: 129.3 kg (285 lb 0.9 oz) (12/04/19 1000)  Body mass index is 36.6 kg/m².    Physical Exam   Constitutional: He appears well-developed.   Chronically ill-appearing. Malnourished. Temporal wasting.     HENT:   Head: Normocephalic and atraumatic.   Eyes: Scleral icterus is present.   Cardiovascular: Normal rate and regular rhythm.   Abdominal: Soft. He exhibits distension. There is tenderness. There is no guarding.   Musculoskeletal: He exhibits edema.   Neurological: He is disoriented.   Not oriented, confused, restless   Nursing note and vitals reviewed.      MELD-Na score: 37 at 12/7/2019  3:07 AM  MELD score: 37 at 12/7/2019  3:07 AM  Calculated from:  Serum Creatinine: 5.9 mg/dL (Rounded to 4 mg/dL) at 12/7/2019  3:07 AM  Serum Sodium: 135 mmol/L at 12/7/2019  3:07 AM  Total Bilirubin: 11.7 mg/dL at 12/7/2019  3:07 AM  INR(ratio): 2.0 at 12/7/2019  3:07 AM  Age: 34 years    Significant Labs:  CBC:   Recent Labs   Lab 12/07/19  1052   WBC 48.00*   RBC 2.38*   HGB 7.9*   HCT 25.7*   PLT 90*     CMP:   Recent Labs   Lab 12/07/19  0307   *   CALCIUM 9.1   ALBUMIN 2.5*   PROT 4.6*   *   K 5.0   CO2 16*   CL 95   BUN 74*   CREATININE 5.9*   ALKPHOS 301*   ALT 33   *   BILITOT 11.7*     Coagulation:   Recent Labs   Lab 12/07/19  0307   INR 2.0*       Significant Imaging: reviewed    Assessment/Plan:     Decompensated hepatic cirrhosis  Patient is a 34-year-old male with history of alcohol abuse in new diagnosis of alcoholic cirrhosis who presented with SBP and pancreatitis,  worsening despite treatment with IV antibiotics.  Repeat infx workup notable for fungemia currently on treatment and undergoing transplant evaluation. Renal failure progressively worsening since admission and started on dialysis.  Completed evaluation, approved for listing.  Transfer to ICU on 12/05 due to hypotension and clinical worsening.  Currently stable on low-dose vasopressor with negative repeat infectious workup.      Recommendation(s)  12/7/19: Patient is not doing well, progressively getting sicker. He was hypothermic to 93F last night. 12/3 blood cutlure growing budding yeast (he was already on micafungin at that time), however 12/5 blood culture so far negative. He is requiring 2 vasopressors for blood pressure support, likely septic/distributive shock. He is anemic today and getting blood transfusion, no overt signs of GI bleed. He is confused, not oriented to time/person/place.    - ID reconsulted  - regrowth of yeast on 12/3 was concerning  - patient is in shock and is medically too high risk for liver transplant at this moment  - continue supportive care with vasopressor, anti-fungal    Discussed with Critical Care team and LTS (Dr. Nettles). Informed patient's parents.    Prognosis is guarded.    Girish Toussaint MD  Staff Physician  Hepatology and Liver Transplant  [facility]  [facility] [place]                Girish Toussaint MD  Staff - Transplant Hepatology  [facility]

## 2019-12-07 NOTE — ASSESSMENT & PLAN NOTE
35 y/o M PMhx alcoholic cirrhosis c/b ascites on diuretics, morbid obesity who presented to OSH 11/20 w/ worsening abdominal pain and distension with LE edema s[place] [facility] [place] and LORENE. Patient was empirically started on ceftriaxone for SBP. Afterwards underwent paracentesis 11/21 w/ removal 6.2L (820 segs (816 corrected)) & given albumin. CT abd/pelvis w/ IV cont 11/21 revealed mild diffuse fluid or stranding about pancreas & lipase 700. Consequently diagnosed w/ pancreatitis. He subsequently developed LORENE and he was started on treatment for presumed HRS. Given worsening leukocytosis (16.5->27) ID was consulted and antibiotics broadened to meropenem. [place] [facility] [place]. Patient underwent transplant evaluation, however, hospital course complicated by peritonitis s/p course of abx w/ transition to SBP ppx, candida glabrata fungemia (blood cx 11/29 in 4/4 bottles, 12/2 NGTD, 12/3 in 1/4 bottles, 12/5 NGTD), (ascites cx 11/26, 11/29, 12/3 all NGTD), LORENE requiring CRRT, and hypotension requiring pressors. ID is re-consulted for persistent fungemia prior to transplantation. TTE 11/29 unremarkable. No signs of fungal endophthalmitis on exam 12/2 per ophtho.    Recommendations  C/w micafungin for now  If patient continues to require increasing pressors will start liposomal amphotericin; will hold off for now due to concern for further renal injury as well as numerous side effects  F/u repeat blood cultures (would draw 2 new sets today)  Recommend CT abd/pelvis to r/o intra-abdominal source  Recommend repeat paracentesis w/ cell count w/ diff and cultures  Would repeat TTE to r/o vegetation  Trialysis placed 12/5 and positive blood cx 12/3; Ideally would be removed, however, given placed after positive blood culture can wait and re-evaluate tomorrow  C/w SBP prophylaxis  Hold off transplantation given patient still w/ fungemia    Transplant  work-up  Hepatitis B core, surface Ag, surface Ab negative  Hepatitis A IgG positive  Hepatitis C Ab negative  Varicella Zoster IgG positive  Strongyloides Ab IgG negative  CMV IgG positive  HIV negative  RPR non-reactive  T-spot negative  States has received vaccinations through grade school  Given Prevnar, Tdap    Needs  Hepatitis B vaccination series  Pneumovax  Shingrix    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 Discussed w/ patient and father at bedside that receipt of vaccines have been temporally associated with GBS including combined diphtheria, pertussis and tetanus, rubella, tetanus toxoid, hepatitis B, hepatitis A, influenza, and meningococcal conjugate vaccines, however, in most cases, the association of vaccination with subsequent GBS is temporal only. Additionally, patient states he has received hepatitis B, and Tetanus in the past without incident. Informed him that hepatitis B, pneumococcal, shingrix, and Tdap vaccines were recommended prior to transplantation as he will require immunosuppressive therapy should he receive a transplant and that these measures were to decrease risk of morbidity and mortality following transplant. Patient is agreeable to receive vaccinations. Patient is currently very ill and there is no urgency to administer the above vaccinations.

## 2019-12-07 NOTE — CONSULTS
[facility]  Infectious Disease  Consult Note    Patient Name: Luis Hurd  MRN: [id number]  Admission Date: 11/25/2019  Hospital Length of Stay: 12 days  Attending Physician: Eugenio Nettles MD  Primary Care Provider: William Nettles MD     Isolation Status: No active isolations    Patient information was obtained from patient, parent, past medical records and ER records.      Inpatient consult to Infectious Diseases  Consult performed by: Luis Alberto Gonzáles MD  Consult ordered by: Girish Toussaint MD        Assessment/Plan:     Infection due to Candida glabrata  33 y/o M PMhx alcoholic cirrhosis c/b ascites on diuretics, morbid obesity who presented to OSH 11/20 w/ worsening abdominal pain and distension with LE edema s/p transfer to [facility] for liver txp evaluation and LORENE. Patient was empirically started on ceftriaxone for SBP. Afterwards underwent paracentesis 11/21 w/ removal 6.2L (820 segs (816 corrected)) & given albumin. CT abd/pelvis w/ IV cont 11/21 revealed mild diffuse fluid or stranding about pancreas & lipase 700. Consequently diagnosed w/ pancreatitis. He subsequently developed LORENE and he was started on treatment for presumed HRS. Given worsening leukocytosis (16.5->27) ID was consulted and antibiotics broadened to meropenem. Patient transferred to [facility] for liver transplant evaluation. Patient underwent transplant evaluation, however, hospital course complicated by peritonitis s/p course of abx w/ transition to SBP ppx, candida glabrata fungemia (blood cx 11/29 in 4/4 bottles, 12/2 NGTD, 12/3 in 1/4 bottles, 12/5 NGTD), (ascites cx 11/26, 11/29, 12/3 all NGTD), LORENE requiring CRRT, and hypotension requiring pressors. ID is re-consulted for persistent fungemia prior to transplantation. TTE 11/29 unremarkable. No signs of fungal endophthalmitis on exam 12/2 per ophtho.    Recommendations  C/w micafungin for now  If patient continues to require increasing pressors will start liposomal  amphotericin; will hold off for now due to concern for further renal injury as well as numerous side effects  F/u repeat blood cultures (would draw 2 new sets today)  Recommend CT abd/pelvis to r/o intra-abdominal source  Recommend repeat paracentesis w/ cell count w/ diff and cultures  Would repeat TTE to r/o vegetation  Trialysis placed 12/5 and positive blood cx 12/3; Ideally would be removed, however, given placed after positive blood culture can wait and re-evaluate tomorrow  C/w SBP prophylaxis  Hold off transplantation given patient still w/ fungemia          Transplant work-up  Hepatitis B core, surface Ag, surface Ab negative  Hepatitis A IgG positive  Hepatitis C Ab negative  Varicella Zoster IgG positive  Strongyloides Ab IgG negative  CMV IgG positive  HIV negative  RPR non-reactive  T-spot negative  States has received vaccinations through grade school  Given Prevnar, Tdap    Needs  Hepatitis B vaccination series  Pneumovax  Shingrix    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 Discussed w/ patient and father at bedside that receipt of vaccines have been temporally associated with GBS including combined diphtheria, pertussis and tetanus, rubella, tetanus toxoid, hepatitis B, hepatitis A, influenza, and meningococcal conjugate vaccines, however, in most cases, the association of vaccination with subsequent GBS is temporal only. Additionally, patient states he has received hepatitis B, and Tetanus in the past without incident. Informed him that hepatitis B, pneumococcal, shingrix, and Tdap vaccines were recommended prior to transplantation as he will require immunosuppressive therapy should he receive a transplant and that these measures were to decrease risk of morbidity and mortality following transplant. Patient is agreeable to receive vaccinations. Patient is currently very ill and there is no urgency to administer the above vaccinations.        Thank you for your consult. I will  follow-up with patient. Please contact us if you have any additional questions.    Luis Alberto Gonzáles MD  Infectious Disease  [facility]    Subjective:     Principal Problem: Spontaneous bacterial peritonitis    HPI: 35 y/o M PMhx alcoholic cirrhosis c/b ascites on diuretics, morbid obesity who presented to OSH 11/20 w/ worsening abdominal pain and distension with LE edema s/p [place] [facility] [place]p [place] and LORENE. Patient was empirically started on ceftriaxone for SBP. Afterwards underwent paracentesis 11/21 w/ removal 6.2L (820 segs (816 corrected)) & given albumin. CT abd/pelvis w/ IV cont 11/21 revealed mild diffuse fluid or stranding about pancreas & lipase 700. Consequently diagnosed w/ pancreatitis. He subsequently developed LORENE and he was started on treatment for presumed HRS. Given worsening leukocytosis (16.5->27) ID was consulted and antibiotics broadened to meropenem. Patient transferred [place] [facility] [place] evaluation. Patient underwent transplant evaluation, however, hospital course complicated by peritonitis s/p course of abx w/ transition to SBP ppx, candida glabrata fungemia (blood cx 11/29 in 4/4 bottles, 12/2 NGTD, 12/3 in 1/4 bottles, 12/5 NGTD), (ascites cx 11/26, 11/29, 12/3 all NGTD), LORENE requiring CRRT, and hypotension requiring pressors. ID is re-consulted for persistent fungemia prior to transplantation.    Past Medical History:   Diagnosis Date    Acute pancreatitis 11/26/2019    Alcoholic cirrhosis of liver with ascites 11/26/2019    Severe alcohol dependence 11/26/2019    Spontaneous bacterial peritonitis 11/25/2019       History reviewed. No pertinent surgical history.    Review of patient's allergies indicates:  No Known Allergies    Medications:  Medications Prior to Admission   Medication Sig    furosemide (LASIX) 40 MG tablet Take 40 mg by mouth once daily.    multivitamin (THERAGRAN) per tablet Take 1 tablet by mouth once daily.     spironolactone (ALDACTONE) 25 MG tablet Take 25 mg by mouth once daily.     Antibiotics (From admission, onward)    Start     Stop Route Frequency Ordered    12/05/19 0900  cefTRIAXone injection 1 g      -- IV Every 24 hours (non-standard times) 12/04/19 1548        Antifungals (From admission, onward)    Start     Stop Route Frequency Ordered    12/01/19 1015  micafungin 100 mg in sodium chloride 0.9 % 100 mL IVPB (ready to mix system)      -- IV Every 24 hours (non-standard times) 12/01/19 0915        Antivirals (From admission, onward)    None           Immunization History   Administered Date(s) Administered    Pneumococcal Conjugate - 13 Valent 12/04/2019    Tdap 12/04/2019       Family History     None        Social History     Socioeconomic History    Marital status: Single     Spouse name: Not on file    Number of children: Not on file    Years of education: Not on file    Highest education level: Not on file   Occupational History    Not on file   Social Needs    Financial resource strain: Not on file    Food insecurity:     Worry: Not on file     Inability: Not on file    Transportation needs:     Medical: Not on file     Non-medical: Not on file   Tobacco Use    Smoking status: Never Smoker    Smokeless tobacco: Never Used   Substance and Sexual Activity    Alcohol use: Not on file    Drug use: Not on file    Sexual activity: Not on file   Lifestyle    Physical activity:     Days per week: Not on file     Minutes per session: Not on file    Stress: Not on file   Relationships    Social connections:     Talks on phone: Not on file     Gets together: Not on file     Attends Mandaen service: Not on file     Active member of club or organization: Not on file     Attends meetings of clubs or organizations: Not on file     Relationship status: Not on file   Other Topics Concern    Not on file   Social History Narrative    Not on file     Review of Systems   Constitutional: Positive for  fatigue. Negative for chills and fever.   HENT: Negative.    Eyes: Negative for photophobia.   Respiratory: Positive for shortness of breath. Negative for cough and chest tightness.    Cardiovascular: Positive for leg swelling. Negative for chest pain.   Gastrointestinal: Positive for abdominal distention. Negative for abdominal pain, constipation, nausea and vomiting.   Skin: Positive for color change.   Allergic/Immunologic: Positive for immunocompromised state.   Neurological: Negative for headaches.   Psychiatric/Behavioral: Positive for confusion (per parents). Negative for agitation.     Objective:     Vital Signs (Most Recent):  Temp: 97.7 °F (36.5 °C) (12/07/19 1615)  Pulse: 101 (12/07/19 1615)  Resp: (!) 30 (12/07/19 1615)  BP: (!) 111/55 (12/07/19 1615)  SpO2: 96 % (12/07/19 1615) Vital Signs (24h Range):  Temp:  [93.4 °F (34.1 °C)-97.9 °F (36.6 °C)] 97.7 °F (36.5 °C)  Pulse:  [] 101  Resp:  [15-30] 30  SpO2:  [93 %-100 %] 96 %  BP: ()/(42-78) 111/55  Arterial Line BP: ()/(34-75) 86/42     Weight: 129.3 kg (285 lb 0.9 oz)  Body mass index is 36.6 kg/m².    Estimated Creatinine Clearance: 26.6 mL/min (A) (based on SCr of 5.6 mg/dL (H)).    Physical Exam   Constitutional: He is oriented to person, place, and time. He appears ill. No distress.   HENT:   Head: Normocephalic and atraumatic.   Mouth/Throat: No oropharyngeal exudate.   R trialysis   Eyes: Right eye exhibits no discharge. Left eye exhibits no discharge. Scleral icterus is present.   Neck: Normal range of motion. Neck supple.   Cardiovascular: Normal rate and regular rhythm.   Pulmonary/Chest: Effort normal and breath sounds normal.   Abdominal: Soft. Bowel sounds are normal. He exhibits distension. There is no tenderness. There is no guarding.   Musculoskeletal: Normal range of motion. He exhibits edema. He exhibits no tenderness.   Neurological: He is alert and oriented to person, place, and time.   Skin: Skin is warm and dry.  No rash noted. He is not diaphoretic.   Striae on abdomen and back   Psychiatric: He has a normal mood and affect. His behavior is normal.       Significant Labs:   Blood Culture:   Recent Labs   Lab 12/02/19  0331 12/03/19  1616 12/03/19  1617 12/05/19  1012 12/05/19  1013   LABBLOO No growth after 5 days.  No growth after 5 days. No Growth to date  No Growth to date  No Growth to date  No Growth to date Gram stain aer bottle: budding yeast   Results called to and read back by:[facility] [id number]   12/07/2019  03:33 No Growth to date  No Growth to date  No Growth to date No Growth to date  No Growth to date  No Growth to date     CBC:   Recent Labs   Lab 12/07/19  0430 12/07/19  1052 12/07/19  1530   WBC 40.04* 48.00* 41.69*   HGB 5.8* 7.9* 7.4*   HCT 18.1* 25.7* 23.9*   PLT 90* 90* 83*     CMP:   Recent Labs   Lab 12/05/19  1710  12/06/19  0300 12/06/19  1429 12/07/19  0307 12/07/19  1320   *   < > 137  137 131* 135* 135*   K 6.2*   < > 4.1  4.1 4.7 5.0 5.4*   CL 91*   < > 94*  94* 94* 95 101   CO2 15*   < > 25  25 19* 16* 11*      < > 108  108 135* 122* 121*   *   < > 53*  53* 62* 74* 79*   CREATININE 12.3*   < > 4.4*  4.4* 5.3* 5.9* 5.6*   CALCIUM 8.5*   < > 8.3*  8.3* 8.6* 9.1 8.2*   PROT 5.3*  --  5.8*  --  4.6*  --    ALBUMIN 2.8*   < > 3.2*  3.2* 2.7* 2.5* 2.5*   BILITOT 11.8*  --  12.2*  --  11.7*  --    ALKPHOS 289*  --  329*  --  301*  --    *  --  143*  --  163*  --    ALT 27  --  31  --  33  --    ANIONGAP 21*   < > 18*  18* 18* 24* 23*   EGFRNONAA 4.7*   < > 16.3*  16.3* 13.0* 11.4* 12.2*    < > = values in this interval not displayed.     Microbiology Results (last 7 days)     Procedure Component Value Units Date/Time    Blood culture [[id number]] Collected:  12/05/19 1013    Order Status:  Completed Specimen:  Blood Updated:  12/07/19 1412     Blood Culture, Routine No Growth to date      No Growth to date      No Growth to date     Blood culture [[id number]] Collected:  12/05/19 1012    Order Status:  Completed Specimen:  Blood Updated:  12/07/19 1412     Blood Culture, Routine No Growth to date      No Growth to date      No Growth to date    Culture, Body Fluid - Bactec [[id number]] Collected:  12/03/19 1007    Order Status:  Completed Specimen:  Body Fluid from Peritoneal Fluid Updated:  12/07/19 1212     Body Fluid Culture, Sterile No Growth to date      No Growth to date      No Growth to date      No Growth to date      No Growth to date    Blood culture [[id number]] Collected:  12/07/19 1119    Order Status:  Sent Specimen:  Blood from Peripheral, Forearm, Left Updated:  12/07/19 1141    Blood culture [[id number]]  (Abnormal) Collected:  11/29/19 1133    Order Status:  Completed Specimen:  Blood Updated:  12/07/19 1118     Blood Culture, Routine Gram stain teresa bottle: yeast       Results called to and read back by: Alysha Khoury LPN  11/30/2019        21:19      Gram stain aer bottle: yeast       Positive results previously called      RITA GLABRATA  Susceptibility pending  ID consult required at [facility] locations.      Narrative:       Collection has been rescheduled by VHT at 11/29/2019 11:02 Reason:   patient in bathroom.   Collection has been rescheduled by VHT at 11/29/2019 11:19 Reason:   Unable to collect  Collection has been rescheduled by VHT at 11/29/2019 11:02 Reason:   patient in bathroom.   Collection has been rescheduled by VHT at 11/29/2019 11:19 Reason:   Unable to collect    Blood culture [[id number]] Collected:  12/02/19 0331    Order Status:  Completed Specimen:  Blood Updated:  12/07/19 0612     Blood Culture, Routine No growth after 5 days.    Blood culture [[id number]] Collected:  12/02/19 0331    Order Status:  Completed Specimen:  Blood Updated:  12/07/19 0612     Blood Culture, Routine No growth after 5 days.    Blood culture [[id number]] Collected:  12/03/19 1617    Order Status:  Completed  Specimen:  Blood Updated:  12/07/19 0336     Blood Culture, Routine Gram stain aer bottle: budding yeast       Results called to and read back by:[facility] [id number]       12/07/2019  03:33    Blood culture [[id number]] Collected:  12/03/19 1616    Order Status:  Completed Specimen:  Blood Updated:  12/06/19 1812     Blood Culture, Routine No Growth to date      No Growth to date      No Growth to date      No Growth to date    Blood culture [[id number]]  (Abnormal) Collected:  11/29/19 1143    Order Status:  Completed Specimen:  Blood Updated:  12/06/19 1300     Blood Culture, Routine Gram stain teresa bottle: yeast       Gram stain aer bottle: yeast      Positive results previously called 12/01/2019  00:11      RITA GLABRATA  ID consult required at [facility] [place] and [facility].  For susceptibility see order #[id number]      Narrative:       Collection has been rescheduled by T at 11/29/2019 11:02 Reason:   patient in bathroom.   Collection has been rescheduled by T at 11/29/2019 11:19 Reason:   Unable to collect  Collection has been rescheduled by T at 11/29/2019 11:02 Reason:   patient in bathroom.   Collection has been rescheduled by T at 11/29/2019 11:19 Reason:   Unable to collect    Culture, Anaerobe [[id number]] Collected:  11/29/19 1556    Order Status:  Completed Specimen:  Body Fluid from Ascites Updated:  12/06/19 0730     Anaerobic Culture No anaerobes isolated    IV catheter culture [[id number]] Collected:  12/03/19 1100    Order Status:  Completed Specimen:  Catheter Tip, Peripheral Updated:  12/05/19 1054     Aerobic Culture - Cath tip No growth    Narrative:       Catheter source:->midline right    Culture, Anaerobe [[id number]] Collected:  11/26/19 0744    Order Status:  Completed Specimen:  Body Fluid from Peritoneal Fluid Updated:  12/05/19 1023     Anaerobic Culture No anaerobes isolated    AFB culture [[id number]] Collected:  12/03/19 1007    Order Status:   Completed Specimen:  Body Fluid from Peritoneal Fluid Updated:  12/04/19 2127     AFB Culture & Smear Culture in progress     AFB CULTURE STAIN No acid fast bacilli seen.    Gram stain [[id number]] Collected:  12/03/19 1007    Order Status:  Completed Specimen:  Body Fluid from Peritoneal Fluid Updated:  12/03/19 1254     Gram Stain Result No WBC's      No organisms seen    Fungus culture [[id number]] Collected:  12/03/19 1007    Order Status:  Sent Specimen:  Body Fluid from Peritoneal Fluid Updated:  12/03/19 1142    Fungus culture [[id number]] Collected:  11/29/19 1556    Order Status:  Completed Specimen:  Body Fluid from Ascites Updated:  12/03/19 0918     Fungus (Mycology) Culture Culture in progress    Aerobic culture [[id number]] Collected:  11/29/19 1556    Order Status:  Completed Specimen:  Body Fluid from Ascites Updated:  12/02/19 0852     Aerobic Bacterial Culture No growth    Blood culture [[id number]] Collected:  11/25/19 2207    Order Status:  Completed Specimen:  Blood Updated:  11/30/19 2312     Blood Culture, Routine No growth after 5 days.    Blood culture [[id number]] Collected:  11/25/19 2230    Order Status:  Completed Specimen:  Blood Updated:  11/30/19 2312     Blood Culture, Routine No growth after 5 days.    Urine culture [[id number]] Collected:  11/29/19 1338    Order Status:  Completed Specimen:  Urine Updated:  11/30/19 1928     Urine Culture, Routine No growth    Narrative:       Preferred Collection Type->Urine, Clean Catch          Significant Imaging: I have reviewed all pertinent imaging results/findings within the past 24 hours.

## 2019-12-07 NOTE — PROGRESS NOTES
Progress Note  Transplant Surgery    Admit Date: 11/25/2019  Post-operative Day:   Hospital Day: 13    ORGAN:     Disease Etiology: Acute Alcoholic Hepatitis  Donor Type:     CDC High Risk:     Donor CMV Status:   Donor CMV Status:   Donor HBcAB:     Donor HBV FARAZ: Organ record is missing.  Donor HCV FARAZ: Organ record is missing.  Donor HCV Status:     Whole or Partial:   Biliary Anastomosis:   Arterial Anatomy:          Follow-up For: * No surgery found *      ASSESSMENT/PLAN:     I conducted multidisciplinary rounds in conjunction with the ICU/Critical Care attending staff, fellows, and residents, with involvement of ancillary services as appropriate. The patient's general condition was reviewed, specifically including allograft function, immunosuppressive management, dietary and nutritional status, pharmacy concerns, and status of expected transition to transplant stepdown care.    For details see the critical care note.    The patient's persistent hypotension, hypothermia, and lactic acidosis are very concerning for ongoing fungal sepsis. Will repeat blood cultures today; assess status if any liver offers are received. I told the patient and his parents that at the moment he would be too unstable for transplant, but that we remain hopeful that his sepsis will be sufficiently cleared should a liver become available.    Eugenio Nettles MD

## 2019-12-07 NOTE — ASSESSMENT & PLAN NOTE
35 yo male with decompensated alcoholic cirrhosis on LTS in SICU for hemodynamic stabilization and CRRT in the setting of ARF 2/2 decompensated liver failure.  Possible transplant list candidate.      Neuro  -Mentation acutely altered  -Obtain abg, lactate, cbc, ammonia    Cardio  -Pressor requirements of 0.16 levo / 0.04 vaso.  -Continue invasive BP monitoring.  -Wean pressors AT  -Attempt to achieve euvolemia.      Pulm  -Orthopnea, improving  -CRRT.  O2 via NC if needed.    SHANIKA  # Decompensated alcohol cirrhosis with ascites  # Alcohol hepatitis with ascites  # Severe alcohol dependence   MELD-Na score: 34 at 11/25/2019 10:07 PM  MELD score: 32 at 11/25/2019 10:07 PM  - patient's last drink about a month ago  - hepatology consulted  - addiction psych consulted   - PETH pending; urine tox  - getting U/S liver with doppler  - viral studies OSH largely negative  - Likely to be listed as liver transplant candidate.     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      Renal     # Hyponatremia  - Continue to monitor, daily bmp  - CRRT     # Anemia of chronic disease  - iron studies, ferritin and folate       Diet:  CLD  DVT PPx:  Heparin   Goals of Care:  full

## 2019-12-07 NOTE — PROGRESS NOTES
[facility]  Nephrology  Progress Note    Patient Name: Luis Hurd  MRN: [id number]  Admission Date: 11/25/2019  Hospital Length of Stay: 12 days  Attending Provider: Eugenio Nettles MD   Primary Care Physician: William Nettles MD  Principal Problem:Spontaneous bacterial peritonitis    Subjective:     HPI: 35 y/o man with hx of alcohol abuse, alcoholic cirrhosis decompensated with ascites,  morbid obesity, no history of CKD baseline sCr 0.7 (as of 11/20/2019), admitted on 11/25/2019 as a transfer from OSH for liver txp evaluation and LORENE, concern for HRS.  Originally presented on 11/20 to OSH with worsening abdominal pain and distention, along with increased lower extremity edema, and also diagnosed with SBP and pancreatitis.  Last drink ~1 month ago (started drinking ~15 yrs ago, with 2-3 wine bottles/day).  On admit, sCr 0.7, had paracentesis with ~6L removal, administered albumin post procedure, no report of acute hypotension during previous hospital stay, yet subsequently developed rise in sCr to 1.63 on 11/22, 2.39 on 11/23, 2.44 on 11/25, and labs on arrival to [facility] on 11/25 up to 3.1.  He was initiated in HRS treatment, receiving albumin, octreotide, and midodrine.  Ofr SBP started on ceftriaxone.      Nephrology consulted for evaluation of Lorene.      Interval History: Patient more confused this AM, H/H significantly decreased. Anuric.     Review of patient's allergies indicates:  No Known Allergies  Current Facility-Administered Medications   Medication Frequency    acetaminophen tablet 325 mg Q4H PRN    bisacodyl suppository 10 mg Daily PRN    calcium carbonate 200 mg calcium (500 mg) chewable tablet 500 mg TID PRN    cefTRIAXone injection 1 g Q24H    dextrose 10% (D10W) Bolus PRN    dextrose 10% (D10W) Bolus PRN    folic acid tablet 1 mg Daily    glucagon (human recombinant) injection 1 mg PRN    glucose chewable tablet 16 g PRN    glucose chewable tablet 24 g PRN     hepatitis B (HEPLISAV-B) 20 mcg/0.5 mL vaccine 0.5 mL vaccine x 1 dose    micafungin 100 mg in sodium chloride 0.9 % 100 mL IVPB (ready to mix system) Q24H    midodrine tablet 15 mg TID    norepinephrine 4 mg in dextrose 5% 250 mL infusion (premix) (titrating) Continuous    ondansetron injection 4 mg Q6H PRN    oxyCODONE immediate release tablet 5 mg Q6H PRN    polyethylene glycol packet 17 g Daily    promethazine (PHENERGAN) 12.5 mg in dextrose 5 % 50 mL IVPB Q6H PRN    senna-docusate 8.6-50 mg per tablet 1 tablet BID    sodium chloride 0.9% flush 10 mL PRN    thiamine tablet 100 mg Daily    traMADol tablet 50 mg Q6H PRN    vasopressin (PITRESSIN) 0.2 Units/mL in dextrose 5 % 100 mL infusion Continuous       Objective:     Vital Signs (Most Recent):  Temp: 97.2 °F (36.2 °C) (12/07/19 0915)  Pulse: 96 (12/07/19 0915)  Resp: (!) 24 (12/07/19 0915)  BP: (!) 103/51 (12/07/19 0915)  SpO2: 98 % (12/07/19 0915)  O2 Device (Oxygen Therapy): room air (12/07/19 0915) Vital Signs (24h Range):  Temp:  [93.4 °F (34.1 °C)-97.9 °F (36.6 °C)] 97.2 °F (36.2 °C)  Pulse:  [] 96  Resp:  [11-27] 24  SpO2:  [92 %-99 %] 98 %  BP: ()/(46-83) 103/51  Arterial Line BP: ()/(30-50) 75/34     Weight: 129.3 kg (285 lb 0.9 oz) (12/04/19 1000)  Body mass index is 36.6 kg/m².  Body surface area is 2.6 meters squared.    I/O last 3 completed shifts:  In: 2709.8 [P.O.:600; I.V.:1346.2; Blood:713.6; IV Piggyback:50]  Out: 2679 [Urine:25; Other:2654]    Physical Exam   Constitutional: He appears well-developed and well-nourished.   HENT:   Head: Normocephalic and atraumatic.   Cardiovascular: Normal rate and regular rhythm.   Pulmonary/Chest: Effort normal and breath sounds normal.   Abdominal: Soft. He exhibits distension.   Musculoskeletal: Normal range of motion. He exhibits no edema.   Neurological: He is alert.   Skin: Skin is warm and dry.   Vitals reviewed.      Significant Labs:  CBC:   Recent Labs   Lab  12/07/19  0430   WBC 40.04*   RBC 1.59*   HGB 5.8*   HCT 18.1*   PLT 90*   *   MCH 36.5*   MCHC 32.0     CMP:   Recent Labs   Lab 12/07/19  0307   *   CALCIUM 9.1   ALBUMIN 2.5*   PROT 4.6*   *   K 5.0   CO2 16*   CL 95   BUN 74*   CREATININE 5.9*   ALKPHOS 301*   ALT 33   *   BILITOT 11.7*     All labs within the past 24 hours have been reviewed.       Assessment/Plan:     Acute renal failure with tubular necrosis  Unclear at this time etiology of LORENE, but likely from HRS (hyponatremia, low/normal Bps) vs prolonged prerenal or volume depletion after paracentesis 6L removal at prior hospital  - Renal US, no significant abnormalities seen in kidneys  - U/P Cr ratio: 0.23; urine sodium<20  - Central line removed in setting of fungemia. Per primary team and ID, trying to give patient some time without central line; requesting earliest line placement 12/6  - 12/4 developed hyperkalemia to 5.4    Plan/ Recommendations:   - CRRT(SLED) today for metabolic clearance and volume removal.Patient scheduled to receive multiple PRBC units. Minimal UOP reported. Patient continues on vasopressors.   - MAP >65  - Strict I/Os and chart  - Hb > 7 gm/dL  - Avoid nephrotoxic agents, NSAIDs, IV contrast, etc  - Will follow closely      Decompensated hepatic cirrhosis  -Per primary team. Confused and restless overnight.         Thank you for your consult. I will follow-up with patient. Please contact us if you have any additional questions.    Conor King MD  Nephrology  [facility]

## 2019-12-07 NOTE — PLAN OF CARE
"Dx: Spontaneous bacterial peritonitis    Shift Events: Pt increasingly confused overnight. With 0300 assessment, temperature decreased and jeff warmer applied, see progress note for details. H&H dropped with AM labs, one unit PRBC started in warmer, additional PRBC ordered. No outward signs of bleeding, hemodynamics initially stable. In AM, increased need for pressor support. Pt attempted to pull arterial line and get out of bed multiple times, MD notified, restraints applied.     Neuro: AAO x4 and Confused intermittently    Vital Signs: BP (!) 115/55 (BP Location: Left arm, Patient Position: Lying)   Pulse 86   Temp (!) 93.6 °F (34.2 °C) (Rectal)   Resp (!) 22   Ht 6' 2" (1.88 m)   Wt 129.3 kg (285 lb 0.9 oz)   SpO2 95%   BMI 36.60 kg/m²     Diet: Renal    Gtts: Levophed at 0.08 mcg/kg/min, Vasopressin at 0.04 units/min    Urine Output: Anuric      Labs/Accuchecks: ; Blood cultures positive for yeast    Skin: Ecchymotic, yellow       "

## 2019-12-07 NOTE — SUBJECTIVE & OBJECTIVE
Past Medical History:   Diagnosis Date    Acute pancreatitis 11/26/2019    Alcoholic cirrhosis of liver with ascites 11/26/2019    Severe alcohol dependence 11/26/2019    Spontaneous bacterial peritonitis 11/25/2019       History reviewed. No pertinent surgical history.    Review of patient's allergies indicates:  No Known Allergies    Medications:  Medications Prior to Admission   Medication Sig    furosemide (LASIX) 40 MG tablet Take 40 mg by mouth once daily.    multivitamin (THERAGRAN) per tablet Take 1 tablet by mouth once daily.    spironolactone (ALDACTONE) 25 MG tablet Take 25 mg by mouth once daily.     Antibiotics (From admission, onward)    Start     Stop Route Frequency Ordered    12/05/19 0900  cefTRIAXone injection 1 g      -- IV Every 24 hours (non-standard times) 12/04/19 1548        Antifungals (From admission, onward)    Start     Stop Route Frequency Ordered    12/01/19 1015  micafungin 100 mg in sodium chloride 0.9 % 100 mL IVPB (ready to mix system)      -- IV Every 24 hours (non-standard times) 12/01/19 0915        Antivirals (From admission, onward)    None           Immunization History   Administered Date(s) Administered    Pneumococcal Conjugate - 13 Valent 12/04/2019    Tdap 12/04/2019       Family History     None        Social History     Socioeconomic History    Marital status: Single     Spouse name: Not on file    Number of children: Not on file    Years of education: Not on file    Highest education level: Not on file   Occupational History    Not on file   Social Needs    Financial resource strain: Not on file    Food insecurity:     Worry: Not on file     Inability: Not on file    Transportation needs:     Medical: Not on file     Non-medical: Not on file   Tobacco Use    Smoking status: Never Smoker    Smokeless tobacco: Never Used   Substance and Sexual Activity    Alcohol use: Not on file    Drug use: Not on file    Sexual activity: Not on file   Lifestyle     Physical activity:     Days per week: Not on file     Minutes per session: Not on file    Stress: Not on file   Relationships    Social connections:     Talks on phone: Not on file     Gets together: Not on file     Attends [employer] service: Not on file     Active member of club or organization: Not on file     Attends meetings of clubs or organizations: Not on file     Relationship status: Not on file   Other Topics Concern    Not on file   Social History Narrative    Not on file     Review of Systems   Constitutional: Positive for fatigue. Negative for chills and fever.   HENT: Negative.    Eyes: Negative for photophobia.   Respiratory: Positive for shortness of breath. Negative for cough and chest tightness.    Cardiovascular: Positive for leg swelling. Negative for chest pain.   Gastrointestinal: Positive for abdominal distention. Negative for abdominal pain, constipation, nausea and vomiting.   Skin: Positive for color change.   Allergic/Immunologic: Positive for immunocompromised state.   Neurological: Negative for headaches.   Psychiatric/Behavioral: Positive for confusion (per parents). Negative for agitation.     Objective:     Vital Signs (Most Recent):  Temp: 97.7 °F (36.5 °C) (12/07/19 1615)  Pulse: 101 (12/07/19 1615)  Resp: (!) 30 (12/07/19 1615)  BP: (!) 111/55 (12/07/19 1615)  SpO2: 96 % (12/07/19 1615) Vital Signs (24h Range):  Temp:  [93.4 °F (34.1 °C)-97.9 °F (36.6 °C)] 97.7 °F (36.5 °C)  Pulse:  [] 101  Resp:  [15-30] 30  SpO2:  [93 %-100 %] 96 %  BP: ()/(42-78) 111/55  Arterial Line BP: ()/(34-75) 86/42     Weight: 129.3 kg (285 lb 0.9 oz)  Body mass index is 36.6 kg/m².    Estimated Creatinine Clearance: 26.6 mL/min (A) (based on SCr of 5.6 mg/dL (H)).    Physical Exam   Constitutional: He is oriented to person, place, and time. He appears ill. No distress.   HENT:   Head: Normocephalic and atraumatic.   Mouth/Throat: No oropharyngeal exudate.   R trialysis   Eyes:  Right eye exhibits no discharge. Left eye exhibits no discharge. Scleral icterus is present.   Neck: Normal range of motion. Neck supple.   Cardiovascular: Normal rate and regular rhythm.   Pulmonary/Chest: Effort normal and breath sounds normal.   Abdominal: Soft. Bowel sounds are normal. He exhibits distension. There is no tenderness. There is no guarding.   Musculoskeletal: Normal range of motion. He exhibits edema. He exhibits no tenderness.   Neurological: He is alert and oriented to person, place, and time.   Skin: Skin is warm and dry. No rash noted. He is not diaphoretic.   Striae on abdomen and back   Psychiatric: He has a normal mood and affect. His behavior is normal.       Significant Labs:   Blood Culture:   Recent Labs   Lab 12/02/19  0331 12/03/19  1616 12/03/19  1617 12/05/19  1012 12/05/19  1013   LABBLOO No growth after 5 days.  No growth after 5 days. No Growth to date  No Growth to date  No Growth to date  No Growth to date Gram stain aer bottle: budding yeast   Results called to and read back by:[facility] [id number]   12/07/2019  03:33 No Growth to date  No Growth to date  No Growth to date No Growth to date  No Growth to date  No Growth to date     CBC:   Recent Labs   Lab 12/07/19  0430 12/07/19  1052 12/07/19  1530   WBC 40.04* 48.00* 41.69*   HGB 5.8* 7.9* 7.4*   HCT 18.1* 25.7* 23.9*   PLT 90* 90* 83*     CMP:   Recent Labs   Lab 12/05/19  1710  12/06/19  0300 12/06/19  1429 12/07/19  0307 12/07/19  1320   *   < > 137  137 131* 135* 135*   K 6.2*   < > 4.1  4.1 4.7 5.0 5.4*   CL 91*   < > 94*  94* 94* 95 101   CO2 15*   < > 25  25 19* 16* 11*      < > 108  108 135* 122* 121*   *   < > 53*  53* 62* 74* 79*   CREATININE 12.3*   < > 4.4*  4.4* 5.3* 5.9* 5.6*   CALCIUM 8.5*   < > 8.3*  8.3* 8.6* 9.1 8.2*   PROT 5.3*  --  5.8*  --  4.6*  --    ALBUMIN 2.8*   < > 3.2*  3.2* 2.7* 2.5* 2.5*   BILITOT 11.8*  --  12.2*  --  11.7*  --    ALKPHOS  289*  --  329*  --  301*  --    *  --  143*  --  163*  --    ALT 27  --  31  --  33  --    ANIONGAP 21*   < > 18*  18* 18* 24* 23*   EGFRNONAA 4.7*   < > 16.3*  16.3* 13.0* 11.4* 12.2*    < > = values in this interval not displayed.     Microbiology Results (last 7 days)     Procedure Component Value Units Date/Time    Blood culture [[id number]] Collected:  12/05/19 1013    Order Status:  Completed Specimen:  Blood Updated:  12/07/19 1412     Blood Culture, Routine No Growth to date      No Growth to date      No Growth to date    Blood culture [[id number]] Collected:  12/05/19 1012    Order Status:  Completed Specimen:  Blood Updated:  12/07/19 1412     Blood Culture, Routine No Growth to date      No Growth to date      No Growth to date    Culture, Body Fluid - Bactec [[id number]] Collected:  12/03/19 1007    Order Status:  Completed Specimen:  Body Fluid from Peritoneal Fluid Updated:  12/07/19 1212     Body Fluid Culture, Sterile No Growth to date      No Growth to date      No Growth to date      No Growth to date      No Growth to date    Blood culture [[id number]] Collected:  12/07/19 1119    Order Status:  Sent Specimen:  Blood from Peripheral, Forearm, Left Updated:  12/07/19 1141    Blood culture [[id number]]  (Abnormal) Collected:  11/29/19 1133    Order Status:  Completed Specimen:  Blood Updated:  12/07/19 1118     Blood Culture, Routine Gram stain teresa bottle: yeast       Results called to and read back by: Alysha Khoury LPN  11/30/2019        21:19      Gram stain aer bottle: yeast       Positive results previously called      RITA GLABRATA  Susceptibility pending  ID consult required at [facility] and [facility] locations.      Narrative:       Collection has been rescheduled by VHT at 11/29/2019 11:02 Reason:   patient in bathroom.   Collection has been rescheduled by VHT at 11/29/2019 11:19 Reason:   Unable to collect  Collection has been rescheduled by VHT at 11/29/2019 11:02 Reason:    patient in bathroom.   Collection has been rescheduled by VHT at 11/29/2019 11:19 Reason:   Unable to collect    Blood culture [[id number]] Collected:  12/02/19 0331    Order Status:  Completed Specimen:  Blood Updated:  12/07/19 0612     Blood Culture, Routine No growth after 5 days.    Blood culture [[id number]] Collected:  12/02/19 0331    Order Status:  Completed Specimen:  Blood Updated:  12/07/19 0612     Blood Culture, Routine No growth after 5 days.    Blood culture [[id number]] Collected:  12/03/19 1617    Order Status:  Completed Specimen:  Blood Updated:  12/07/19 0336     Blood Culture, Routine Gram stain aer bottle: budding yeast       Results called to and read back by:[facility] [id number]       12/07/2019  03:33    Blood culture [[id number]] Collected:  12/03/19 1616    Order Status:  Completed Specimen:  Blood Updated:  12/06/19 1812     Blood Culture, Routine No Growth to date      No Growth to date      No Growth to date      No Growth to date    Blood culture [[id number]]  (Abnormal) Collected:  11/29/19 1143    Order Status:  Completed Specimen:  Blood Updated:  12/06/19 1300     Blood Culture, Routine Gram stain teresa bottle: yeast       Gram stain aer bottle: yeast      Positive results previously called 12/01/2019  00:11      RITA GLABRATA  ID consult required at [facility] and [facility].  For susceptibility see order #[id number]      Narrative:       Collection has been rescheduled by VHT at 11/29/2019 11:02 Reason:   patient in bathroom.   Collection has been rescheduled by VHT at 11/29/2019 11:19 Reason:   Unable to collect  Collection has been rescheduled by VHT at 11/29/2019 11:02 Reason:   patient in bathroom.   Collection has been rescheduled by VHT at 11/29/2019 11:19 Reason:   Unable to collect    Culture, Anaerobe [[id number]] Collected:  11/29/19 1556    Order Status:  Completed Specimen:  Body Fluid from Ascites Updated:  12/06/19 0730     Anaerobic  Culture No anaerobes isolated    IV catheter culture [[id number]] Collected:  12/03/19 1100    Order Status:  Completed Specimen:  Catheter Tip, Peripheral Updated:  12/05/19 1054     Aerobic Culture - Cath tip No growth    Narrative:       Catheter source:->midline right    Culture, Anaerobe [[id number]] Collected:  11/26/19 0744    Order Status:  Completed Specimen:  Body Fluid from Peritoneal Fluid Updated:  12/05/19 1023     Anaerobic Culture No anaerobes isolated    AFB culture [[id number]] Collected:  12/03/19 1007    Order Status:  Completed Specimen:  Body Fluid from Peritoneal Fluid Updated:  12/04/19 2127     AFB Culture & Smear Culture in progress     AFB CULTURE STAIN No acid fast bacilli seen.    Gram stain [[id number]] Collected:  12/03/19 1007    Order Status:  Completed Specimen:  Body Fluid from Peritoneal Fluid Updated:  12/03/19 1254     Gram Stain Result No WBC's      No organisms seen    Fungus culture [[id number]] Collected:  12/03/19 1007    Order Status:  Sent Specimen:  Body Fluid from Peritoneal Fluid Updated:  12/03/19 1142    Fungus culture [[id number]] Collected:  11/29/19 1556    Order Status:  Completed Specimen:  Body Fluid from Ascites Updated:  12/03/19 0918     Fungus (Mycology) Culture Culture in progress    Aerobic culture [[id number]] Collected:  11/29/19 1556    Order Status:  Completed Specimen:  Body Fluid from Ascites Updated:  12/02/19 0852     Aerobic Bacterial Culture No growth    Blood culture [[id number]] Collected:  11/25/19 2207    Order Status:  Completed Specimen:  Blood Updated:  11/30/19 2312     Blood Culture, Routine No growth after 5 days.    Blood culture [[id number]] Collected:  11/25/19 2230    Order Status:  Completed Specimen:  Blood Updated:  11/30/19 2312     Blood Culture, Routine No growth after 5 days.    Urine culture [[id number]] Collected:  11/29/19 1338    Order Status:  Completed Specimen:  Urine Updated:  11/30/19 1928     Urine Culture, Routine No growth     Narrative:       Preferred Collection Type->Urine, Clean Catch          Significant Imaging: I have reviewed all pertinent imaging results/findings within the past 24 hours.

## 2019-12-07 NOTE — SUBJECTIVE & OBJECTIVE
Interval History:   12/7/19: Patient is not doing well, progressively getting sicker. He was hypothermic to 93F last night. 12/3 blood cutlure growing budding yeast (he was already on micafungin at that time), however 12/5 blood culture so far negative. He is requiring 2 vasopressors for blood pressure support, likely septic/distributive shock. He is anemic today and getting blood transfusion, no overt signs of GI bleed. He is confused, not oriented to time/person/place.      Current Facility-Administered Medications   Medication    0.9%  NaCl infusion (CRRT USE ONLY)    acetaminophen tablet 325 mg    bisacodyl suppository 10 mg    calcium carbonate 200 mg calcium (500 mg) chewable tablet 500 mg    cefTRIAXone injection 1 g    dextrose 10% (D10W) Bolus    dextrose 10% (D10W) Bolus    folic acid tablet 1 mg    glucagon (human recombinant) injection 1 mg    glucose chewable tablet 16 g    glucose chewable tablet 24 g    hepatitis B (HEPLISAV-B) 20 mcg/0.5 mL vaccine 0.5 mL    magnesium sulfate 2g in water 50mL IVPB (premix)    micafungin 100 mg in sodium chloride 0.9 % 100 mL IVPB (ready to mix system)    midodrine tablet 15 mg    norepinephrine 4 mg in dextrose 5% 250 mL infusion (premix) (titrating)    ondansetron injection 4 mg    oxyCODONE immediate release tablet 5 mg    polyethylene glycol packet 17 g    promethazine (PHENERGAN) 12.5 mg in dextrose 5 % 50 mL IVPB    senna-docusate 8.6-50 mg per tablet 1 tablet    sodium chloride 0.9% flush 10 mL    sodium phosphate 20.01 mmol in dextrose 5 % 250 mL IVPB    sodium phosphate 30 mmol in dextrose 5 % 250 mL IVPB    sodium phosphate 39.99 mmol in dextrose 5 % 250 mL IVPB    thiamine tablet 100 mg    traMADol tablet 50 mg    vasopressin (PITRESSIN) 0.2 Units/mL in dextrose 5 % 100 mL infusion       Objective:     Vital Signs (Most Recent):  Temp: 97 °F (36.1 °C) (12/07/19 1145)  Pulse: 95 (12/07/19 1145)  Resp: 19 (12/07/19 1145)  BP:  122/60 (12/07/19 1130)  SpO2: 100 % (12/07/19 1145) Vital Signs (24h Range):  Temp:  [93.4 °F (34.1 °C)-97.9 °F (36.6 °C)] 97 °F (36.1 °C)  Pulse:  [] 95  Resp:  [14-27] 19  SpO2:  [92 %-100 %] 100 %  BP: ()/(42-78) 122/60  Arterial Line BP: ()/(30-50) 94/45     Weight: 129.3 kg (285 lb 0.9 oz) (12/04/19 1000)  Body mass index is 36.6 kg/m².    Physical Exam   Constitutional: He appears well-developed.   Chronically ill-appearing. Malnourished. Temporal wasting.     HENT:   Head: Normocephalic and atraumatic.   Eyes: Scleral icterus is present.   Cardiovascular: Normal rate and regular rhythm.   Abdominal: Soft. He exhibits distension. There is tenderness. There is no guarding.   Musculoskeletal: He exhibits edema.   Neurological: He is disoriented.   Not oriented, confused, restless   Nursing note and vitals reviewed.      MELD-Na score: 37 at 12/7/2019  3:07 AM  MELD score: 37 at 12/7/2019  3:07 AM  Calculated from:  Serum Creatinine: 5.9 mg/dL (Rounded to 4 mg/dL) at 12/7/2019  3:07 AM  Serum Sodium: 135 mmol/L at 12/7/2019  3:07 AM  Total Bilirubin: 11.7 mg/dL at 12/7/2019  3:07 AM  INR(ratio): 2.0 at 12/7/2019  3:07 AM  Age: 34 years    Significant Labs:  CBC:   Recent Labs   Lab 12/07/19  1052   WBC 48.00*   RBC 2.38*   HGB 7.9*   HCT 25.7*   PLT 90*     CMP:   Recent Labs   Lab 12/07/19  0307   *   CALCIUM 9.1   ALBUMIN 2.5*   PROT 4.6*   *   K 5.0   CO2 16*   CL 95   BUN 74*   CREATININE 5.9*   ALKPHOS 301*   ALT 33   *   BILITOT 11.7*     Coagulation:   Recent Labs   Lab 12/07/19  0307   INR 2.0*       Significant Imaging: reviewed

## 2019-12-07 NOTE — ASSESSMENT & PLAN NOTE
Unclear at this time etiology of LORENE, but likely from HRS (hyponatremia, low/normal Bps) vs prolonged prerenal or volume depletion after paracentesis 6L removal at prior hospital  - Renal US, no significant abnormalities seen in kidneys  - U/P Cr ratio: 0.23; urine sodium<20  - Central line removed in setting of fungemia. Per primary team and ID, trying to give patient some time without central line; requesting earliest line placement 12/6  - 12/4 developed hyperkalemia to 5.4    Plan/ Recommendations:   - CRRT(SLED) today for metabolic clearance and volume removal.Patient scheduled to receive multiple PRBC units. Minimal UOP reported. Patient continues on vasopressors.   - MAP >65  - Strict I/Os and chart  - Hb > 7 gm/dL  - Avoid nephrotoxic agents, NSAIDs, IV contrast, etc  - Will follow closely

## 2019-12-07 NOTE — PROGRESS NOTES
With 0300 assesment, oral temperature read 93 F. Rectal temperature probe inserted, temp correlates with oral. Harish warmer and heat packs applied, MD notified, will continue to monitor.

## 2019-12-08 NOTE — PROCEDURES
"Luis Hurd is a 34 y.o. male patient.    Temp: 100 °F (37.8 °C) (12/08/19 1303)  Pulse: (!) 123 (12/08/19 1303)  Resp: (!) 22 (12/08/19 1303)  BP: (!) 99/54 (12/08/19 1303)  SpO2: 96 % (12/08/19 1303)  Weight: 129.3 kg (285 lb 0.9 oz) (12/04/19 1000)  Height: 6' 2" (188 cm) (12/04/19 1000)       Intubation  Date/Time: 12/8/2019 2:29 PM  Location procedure was performed: [facility] CRITICAL CARE SURGERY  Performed by: Sterling Blood MD  Authorized by: Sha Brunson MD   Pre-operative diagnosis: respiratory failure  Post-operative diagnosis: respiratory failure   Consent Done: Emergent Situation  Time out: Immediately prior to procedure a "time out" was called to verify the correct patient, procedure, equipment, support staff and site/side marked as required.  Indications: respiratory failure and  respiratory distress  Intubation method: direct  Patient status: paralyzed (RSI)  Preoxygenation: nonrebreather mask  Sedatives: etomidate  Paralytic: rocuronium  Laryngoscope size: Vásquez 2  Tube size: 8.0 mm  Tube type: cuffed  Number of attempts: 1  Cricoid pressure: yes  Cords visualized: yes  Post-procedure assessment: chest rise and CO2 detector  Breath sounds: rales/crackles  Cuff inflated: yes  ETT to lip: 24 cm  Tube secured with: ETT acuña  Chest x-ray interpreted by me.  Chest x-ray findings: endotracheal tube in appropriate position  Comments: Decision made to intubate patient for respiratory distress. Awake NG tube was not placed due to known esophageal varices. Patient NPO for 48hrs+. Following induction, pt with copious emesis, estimated 2L+ of dark brown liquid. Oropharynx was suctioned with two suction catheters. An endotracheal tube was placed blindly into the esophagus and balloon inflated to divert the gastric contents out of the oropharynx. Endotracheal intubation was then performed under direct laryngoscopy with a grade 1 view of the vocal cords. The ETT that was previously placed " in the esophagus was then removed. Emergent bronchoscopy was performed following intubation, tube placement was confirmed above the юлия with bronchoscope. See bronchoscopy note for findings.            Sterling Blood  12/8/2019

## 2019-12-08 NOTE — PROGRESS NOTES
[facility]  Critical Care - Surgery  Progress Note    Patient Name: Luis Hurd  MRN: [id number]  Admission Date: 11/25/2019  Hospital Length of Stay: 13 days  Code Status: Full Code  Attending Provider: Eugenio Nettles MD  Primary Care Provider: William Nettles MD   Principal Problem: Spontaneous bacterial peritonitis    Subjective:     Hospital/ICU Course:  Pt arrived from OSH.  Newly diagnosed alcoholic cirrhosis, pt accepted to SICU with LTS as primary.  Renal function declining.  RIJ trialysis catheter placed with plan for patient to possibly receive CRRT evening of 12/5/19.      12/6/19 - Pt received CRRT overnight, requiring pressor support at this time.  Otherwise doing well, possibly being put on transplant list today.      12/7 - Overnight patient had blood leak from his arterial line angiocatheter.  Hardware reconnected with no evidence of continued leak.  Patient became hypothermic to 93F shortly after.  Mentation has decreased from baseline.      12/8 - Overnight patient had increased pressor requirement while on CRRT.  Lactate uptrending.  Clinical picture concerning at this time.  Mentation continues to be altered.           Past Medical History:   Diagnosis Date    Acute pancreatitis 11/26/2019    Alcoholic cirrhosis of liver with ascites 11/26/2019    Severe alcohol dependence 11/26/2019    Spontaneous bacterial peritonitis 11/25/2019       History reviewed. No pertinent surgical history.    Review of patient's allergies indicates:  No Known Allergies    Family History     None        Tobacco Use    Smoking status: Never Smoker    Smokeless tobacco: Never Used   Substance and Sexual Activity    Alcohol use: Not on file    Drug use: Not on file    Sexual activity: Not on file      Review of Systems   Constitutional: Positive for fatigue. Negative for chills and fever.   HENT: Negative.    Respiratory: Positive for shortness of breath. Negative for cough and chest  tightness.    Cardiovascular: Positive for leg swelling. Negative for chest pain.   Gastrointestinal: Positive for abdominal distention and abdominal pain. Negative for constipation, nausea and vomiting.   Endocrine: Negative.    Genitourinary: Negative.    Musculoskeletal: Negative.    Skin: Positive for color change.   Allergic/Immunologic: Negative.    Neurological: Negative.      Objective:     Vital Signs (Most Recent):  Temp: 97.9 °F (36.6 °C) (12/08/19 0530)  Pulse: 108 (12/08/19 0530)  Resp: (!) 32 (12/08/19 0530)  BP: (!) 103/53 (12/08/19 0530)  SpO2: 95 % (12/08/19 0530) Vital Signs (24h Range):  Temp:  [96.6 °F (35.9 °C)-98.4 °F (36.9 °C)] 97.9 °F (36.6 °C)  Pulse:  [] 108  Resp:  [19-33] 32  SpO2:  [94 %-100 %] 95 %  BP: ()/(42-78) 103/53  Arterial Line BP: ()/(34-75) 75/40     Weight: 129.3 kg (285 lb 0.9 oz)  Body mass index is 36.6 kg/m².      Intake/Output Summary (Last 24 hours) at 12/8/2019 0722  Last data filed at 12/8/2019 0500  Gross per 24 hour   Intake 5268.05 ml   Output 1481 ml   Net 3787.05 ml       Physical Exam   Constitutional: He is oriented to person, place, and time. No distress.   HENT:   Head: Normocephalic.   Eyes: Pupils are equal, round, and reactive to light. Scleral icterus is present.   Neck: Neck supple.   Cardiovascular: Normal rate, regular rhythm, normal heart sounds and intact distal pulses.   Pulmonary/Chest: Effort normal and breath sounds normal. No respiratory distress.   Abdominal: He exhibits distension. There is tenderness. There is no rebound and no guarding.   Neurological: He is oriented to person, place, and time.   Skin: Skin is warm and dry. He is not diaphoretic.   Psychiatric: He has a normal mood and affect. His behavior is normal.   Nursing note and vitals reviewed.      Vents:       Lines/Drains/Airways     Central Venous Catheter Line                 Trialysis (Dialysis) Catheter 12/05/19 1429 right internal jugular 2 days           Arterial Line                 Arterial Line 12/05/19 1544 Right Radial 2 days          Peripheral Intravenous Line                 Peripheral IV - Single Lumen 12/03/19 1100 18 G Anterior;Right Upper Arm 4 days                Significant Labs:    CBC/Anemia Profile:  Recent Labs   Lab 12/07/19  1530 12/07/19  2327 12/08/19  0318   WBC 41.69* 55.62* 54.18*   HGB 7.4* 7.4* 6.3*   HCT 23.9* 23.3* 20.7*   PLT 83* 74* 63*   * 106* 109*   RDW 21.0* 22.6* 23.5*        Chemistries:  Recent Labs   Lab 12/07/19  0307 12/07/19  1320 12/07/19  2209 12/08/19 0318   * 135* 136 134*  134*   K 5.0 5.4* 5.5* 5.3*  5.3*   CL 95 101 99 98  98   CO2 16* 11* 15* 11*  11*   BUN 74* 79* 41* 45*  45*   CREATININE 5.9* 5.6* 3.2* 3.6*  3.6*   CALCIUM 9.1 8.2* 8.5* 8.4*  8.4*   ALBUMIN 2.5* 2.5* 2.7* 2.8*  2.8*   PROT 4.6*  --   --  4.5*   BILITOT 11.7*  --   --  13.3*   ALKPHOS 301*  --   --  258*   ALT 33  --   --  43   *  --   --  222*   MG 2.6 2.4 2.3 2.4  2.4   PHOS 6.3* 6.2* 4.6* 5.5*  5.5*       All pertinent labs within the past 24 hours have been reviewed.    Significant Imaging: I have reviewed all pertinent imaging results/findings within the past 24 hours.    Assessment/Plan:     Decompensated hepatic cirrhosis  35 yo male with decompensated alcoholic cirrhosis on LTS in SICU for hemodynamic stabilization and CRRT in the setting of ARF 2/2 decompensated liver failure.  Likely not clinically stable for liver transplantation at this time.      Neuro  -Mentation altered  -Lactate uptrending, ammonia mildly elevated.    -Delerium precautions.      Cardio  -Pressor requirements increasing while on CRRT.  Vaso 0.04/levo 0.36  -Continue invasive BP monitoring.  -Wean pressors AT    Pulm  -Orthopnea, improving  -CRRT.    -Oxygen therapy.  paO2 50's while on 3L MITA VOSS  # Decompensated alcohol cirrhosis with ascites  # Alcohol hepatitis with ascites  # Severe alcohol dependence   MELD-Na score: 34 at  11/25/2019 10:07 PM  MELD score: 32 at 11/25/2019 10:07 PM  - patient's last drink about a month ago  - hepatology consulted  - addiction psych consulted   - PETH pending; urine tox  - getting U/S liver with doppler  - viral studies OSH largely negative  - Likely to be listed as liver transplant candidate.     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      #Fungemia - yeast growing in cx from OSH.  Budding yeast growing in cx here.  -Blood cx repeated x2 12/7  -Micafungin  -ID Following, recs appreciated.  -Consider removing lines in the setting of fungemia.  Possible addition of Liposomal Amphotericin B if clinical picture continues to deteriorate.      Renal     # Hyponatremia  - Continue to monitor, daily bmp  - CRRT     # Anemia of chronic disease  - iron studies, ferritin and folate       Diet:  CLD  DVT PPx:  Heparin   Goals of Care:  full       Pedro Jacobsen DO  Critical Care - Surgery  [facility]

## 2019-12-08 NOTE — PROGRESS NOTES
Progress Note  Transplant Surgery    Admit Date: 11/25/2019  Post-operative Day:   Hospital Day: 14    ORGAN:     Disease Etiology: Acute Alcoholic Hepatitis  Donor Type:     CDC High Risk:     Donor CMV Status:   Donor CMV Status:   Donor HBcAB:     Donor HBV FARAZ: Organ record is missing.  Donor HCV FARAZ: Organ record is missing.  Donor HCV Status:     Whole or Partial:   Biliary Anastomosis:   Arterial Anatomy:          Follow-up For: * No surgery found *      ASSESSMENT/PLAN:     I conducted multidisciplinary rounds in conjunction with the ICU/Critical Care attending staff, fellows, and residents, with involvement of ancillary services as appropriate. The patient's general condition was reviewed, specifically including allograft function, immunosuppressive management, dietary and nutritional status, pharmacy concerns, and status of expected transition to transplant stepdown care.    For details see the critical care note.    Pt. has had progressive deterioration in hemodynamic and respiratory status, now intubated with high vent settings and high dose pressors.  His prognosis is limited, and his family understands that transplant is not possible at this time. I advised him that he is at high risk of dying within the next few days.    Eugenio Nettles MD

## 2019-12-08 NOTE — SUBJECTIVE & OBJECTIVE
Interval History: Patient critically ill with high pressor support. Multiple PRBC's transfusion in the past 24 hrs.      Review of patient's allergies indicates:  No Known Allergies  Current Facility-Administered Medications   Medication Frequency    0.9%  NaCl infusion (CRRT USE ONLY) Continuous    0.9%  NaCl infusion (for blood administration) Q24H PRN    acetaminophen tablet 325 mg Q4H PRN    bisacodyl suppository 10 mg Daily PRN    calcium carbonate 200 mg calcium (500 mg) chewable tablet 500 mg TID PRN    cefTRIAXone injection 1 g Q24H    dextrose 10% (D10W) Bolus PRN    dextrose 10% (D10W) Bolus PRN    folic acid tablet 1 mg Daily    glucagon (human recombinant) injection 1 mg PRN    glucose chewable tablet 16 g PRN    glucose chewable tablet 24 g PRN    hepatitis B (HEPLISAV-B) 20 mcg/0.5 mL vaccine 0.5 mL vaccine x 1 dose    magnesium sulfate 2g in water 50mL IVPB (premix) PRN    micafungin 100 mg in sodium chloride 0.9 % 100 mL IVPB (ready to mix system) Q24H    midodrine tablet 15 mg TID    norepinephrine 4 mg in dextrose 5% 250 mL infusion (premix) (titrating) Continuous    ondansetron injection 4 mg Q6H PRN    oxyCODONE immediate release tablet 5 mg Q6H PRN    polyethylene glycol packet 17 g Daily    promethazine (PHENERGAN) 12.5 mg in dextrose 5 % 50 mL IVPB Q6H PRN    senna-docusate 8.6-50 mg per tablet 1 tablet BID    sodium chloride 0.9% flush 10 mL PRN    sodium phosphate 20.01 mmol in dextrose 5 % 250 mL IVPB PRN    sodium phosphate 30 mmol in dextrose 5 % 250 mL IVPB PRN    sodium phosphate 39.99 mmol in dextrose 5 % 250 mL IVPB PRN    thiamine tablet 100 mg Daily    traMADol tablet 50 mg Q6H PRN    vasopressin (PITRESSIN) 0.2 Units/mL in dextrose 5 % 100 mL infusion Continuous       Objective:     Vital Signs (Most Recent):  Temp: 99 °F (37.2 °C) (12/08/19 0745)  Pulse: 107 (12/08/19 0745)  Resp: (!) 29 (12/08/19 0745)  BP: (!) 89/43 (12/08/19 0745)  SpO2: 96 %  (12/08/19 4345)  O2 Device (Oxygen Therapy): nasal cannula (12/08/19 4718) Vital Signs (24h Range):  Temp:  [96.6 °F (35.9 °C)-99 °F (37.2 °C)] 99 °F (37.2 °C)  Pulse:  [] 107  Resp:  [19-33] 29  SpO2:  [94 %-100 %] 96 %  BP: ()/(42-78) 89/43  Arterial Line BP: ()/(33-75) 64/33     Weight: 129.3 kg (285 lb 0.9 oz) (12/04/19 1000)  Body mass index is 36.6 kg/m².  Body surface area is 2.6 meters squared.    I/O last 3 completed shifts:  In: 6132.3 [I.V.:5591.3; Blood:491; IV Piggyback:50]  Out: 1673 [Other:1673]    Physical Exam   Constitutional:   Critically ill   Cardiovascular: Normal rate.   tachycardic   Pulmonary/Chest: Effort normal and breath sounds normal.   Abdominal: Soft. He exhibits distension.   Musculoskeletal: Normal range of motion. He exhibits no edema.   Neurological: He is alert.   Confused   Skin: He is diaphoretic.   Multiple telangiectatic lesions on chest, purpura on abdomen   Vitals reviewed.      Significant Labs:  CBC:   Recent Labs   Lab 12/08/19 0318   WBC 54.18*   RBC 1.90*   HGB 6.3*   HCT 20.7*   PLT 63*   *   MCH 33.2*   MCHC 30.4*     CMP:   Recent Labs   Lab 12/08/19 0318   GLU 98  98   CALCIUM 8.4*  8.4*   ALBUMIN 2.8*  2.8*   PROT 4.5*   *  134*   K 5.3*  5.3*   CO2 11*  11*   CL 98  98   BUN 45*  45*   CREATININE 3.6*  3.6*   ALKPHOS 258*   ALT 43   *   BILITOT 13.3*     All labs within the past 24 hours have been reviewed.     Significant Imaging:  X-Ray: Reviewed  Personally reviewed

## 2019-12-08 NOTE — EICU
Called into room for emergent intubation.  Physician verification done.  Time out completed using proper pt identifiers.    11:36  Pt vomited prior to start, mouth immediately suctioned. Dr Blood at bedside.  11:43  Etomidate 20mg IV given &   11:44 Rocuronium 50mg IV given pre-intubation.  11:45  Intubated per Dr. Sterling Blood with 8.0 oral ETT & bagged with 100% O2 prior to connecting to ventilator.  Bedside confirms bilateral breath sounds. Secured at 24 at the lip. Oral care & suctioning.  PCXR ordered  11:50  NGT inserted to left nare per bedside RN.  11:58  Emergent Bronchoscopy performed by  Dr. Blood & Bernardo Deleon -dept surgery PRG 2.  12:02  Versed 2mg IV given during procedure.  12:25  Pt tolerated procedure.

## 2019-12-08 NOTE — ASSESSMENT & PLAN NOTE
35 yo male with decompensated alcoholic cirrhosis on LTS in SICU for hemodynamic stabilization and CRRT in the setting of ARF 2/2 decompensated liver failure.  Likely not clinically stable for liver transplantation at this time.      Neuro  -Mentation altered  -Lactate uptrending, ammonia mildly elevated.    -Delerium precautions.      Cardio  -Pressor requirements increasing while on CRRT.  Vaso 0.04/levo 0.36  -Continue invasive BP monitoring.  -Wean pressors AT    Pulm  -Orthopnea, improving  -CRRT.    -Oxygen therapy.  paO2 50's while on 3L MITA VOSS  # Decompensated alcohol cirrhosis with ascites  # Alcohol hepatitis with ascites  # Severe alcohol dependence   MELD-Na score: 34 at 11/25/2019 10:07 PM  MELD score: 32 at 11/25/2019 10:07 PM  - patient's last drink about a month ago  - hepatology consulted  - addiction psych consulted   - PETH pending; urine tox  - getting U/S liver with doppler  - viral studies OSH largely negative  - Likely to be listed as liver transplant candidate.     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      #Fungemia - yeast growing in cx from OSH.  Budding yeast growing in cx here.  -Blood cx repeated x2 12/7  -Micafungin  -ID Following, recs appreciated.  -Consider removing lines in the setting of fungemia.  Possible addition of Liposomal Amphotericin B if clinical picture continues to deteriorate.      Renal     # Hyponatremia  - Continue to monitor, daily bmp  - CRRT     # Anemia of chronic disease  - iron studies, ferritin and  folate       Diet:  CLD  DVT PPx:  Heparin   Goals of Care:  full

## 2019-12-08 NOTE — PROGRESS NOTES
Per MD order, pt rinsed back from CRRT at 2300 r/t hypotension and increased need for pressor support. Will titrate down Levophed and reevaluate need for CRRT and continue to monitor. Pt temperature trending down, jeff warmer and heat packs applied.

## 2019-12-08 NOTE — PROGRESS NOTES
[facility]  Infectious Disease  Progress Note    Patient Name: Luis Hurd  MRN: [id number]  Admission Date: 11/25/2019  Length of Stay: 13 days  Attending Physician: Eugenio Nettles MD  Primary Care Provider: William Nettles MD    Isolation Status: No active isolations  Assessment/Plan:      Infection due to Candida glabrata  33 y/o M PMhx alcoholic cirrhosis c/b ascites on diuretics, morbid obesity who presented to OSH 11/20 w/ worsening abdominal pain and distension with LE edema s/p transfer to [facility] [place] liver txp evaluation and LORENE. Patient was empirically started on ceftriaxone for SBP. Afterwards underwent paracentesis 11/21 w/ removal 6.2L (820 segs (816 corrected)) & given albumin. CT abd/pelvis w/ IV cont 11/21 revealed mild diffuse fluid or stranding about pancreas & lipase 700. Consequently diagnosed w/ pancreatitis. He subsequently developed LORENE and he was started on treatment for presumed HRS. Given worsening leukocytosis (16.5->27) ID was consulted and antibiotics broadened to meropenem. Patient transferred to [facility] for liver transplant evaluation.  hospital course complicated by peritonitis s/p course of abx w/ transition to SBP ppx, candida glabrata fungemia (blood cx 11/29 in 4/4 bottles, 12/2 NGTD, 12/3 in 1/4 bottles, 12/5 NGTD), (ascites cx 11/26, 11/29, 12/3 all NGTD), LORENE requiring CRRT, and hypotension requiring pressors. ID is re-consulted for persistent fungemia prior to transplantation. TTE 11/29 unremarkable. No signs of fungal endophthalmitis on exam 12/2 per ophtho.    Recommendations  C/w micafungin and empiric meropenem for now  F/u repeat blood cultures   Recommend CT abd/pelvis to r/o intra-abdominal source  Recommend repeat paracentesis w/ cell count w/ diff and cultures  Would repeat TTE to r/o vegetation  C/w SBP prophylaxis  Hold off transplantation given patient still w/ fungemia  Discussed with transplant surgery will follow        Anticipated Disposition:  pending    Thank you for your consult. I will follow-up with patient. Please contact us if you have any additional questions.    Yfn Robles MD  Infectious Disease  [facility]    Subjective:     Principal Problem:Spontaneous bacterial peritonitis    HPI: 35 y/o M PMhx alcoholic cirrhosis c/b ascites on diuretics, morbid obesity who presented to OSH 11/20 w/ worsening abdominal pain and distension with LE edema s/p transfer to [facility] [place] for liver txp evaluation and LORENE. Patient was empirically started on ceftriaxone for SBP. Afterwards underwent paracentesis 11/21 w/ removal 6.2L (820 segs (816 corrected)) & given albumin. CT abd/pelvis w/ IV cont 11/21 revealed mild diffuse fluid or stranding about pancreas & lipase 700. Consequently diagnosed w/ pancreatitis. He subsequently developed LORENE and he was started on treatment for presumed HRS. Given worsening leukocytosis (16.5->27) ID was consulted and antibiotics broadened to meropenem. Patient transferred to [facility] [place] for liver transplant evaluation. Patient underwent transplant evaluation, however, hospital course complicated by peritonitis s/p course of abx w/ transition to SBP ppx, candida glabrata fungemia (blood cx 11/29 in 4/4 bottles, 12/2 NGTD, 12/3 in 1/4 bottles, 12/5 NGTD), (ascites cx 11/26, 11/29, 12/3 all NGTD), LORENE requiring CRRT, and hypotension requiring pressors. ID is re-consulted for persistent fungemia prior to transplantation.  Interval History: increasing pressor requirement, oxygen requirement stable, rising WBC, lactate 11    Review of Systems   Constitutional: Negative for activity change, appetite change, chills, fatigue and fever.   HENT: Negative.  Negative for congestion, dental problem, mouth sores and sinus pressure.    Eyes: Negative for photophobia, pain, redness and visual disturbance.   Respiratory: Negative for cough, chest tightness, shortness of breath and wheezing.    Cardiovascular: Negative for chest pain and leg  swelling.   Gastrointestinal: Negative for abdominal distention, abdominal pain, constipation, diarrhea, nausea and vomiting.   Endocrine: Negative for polyuria.   Genitourinary: Negative for decreased urine volume, dysuria and frequency.   Musculoskeletal: Negative for joint swelling.   Skin: Negative for color change.   Allergic/Immunologic: Positive for immunocompromised state. Negative for food allergies.   Neurological: Negative for dizziness, weakness and headaches.   Hematological: Negative for adenopathy.   Psychiatric/Behavioral: Negative for agitation and confusion. The patient is not nervous/anxious.      Objective:     Vital Signs (Most Recent):  Temp: 99.7 °F (37.6 °C) (12/08/19 0915)  Pulse: (!) 112 (12/08/19 0915)  Resp: (!) 29 (12/08/19 0915)  BP: (!) 109/55 (12/08/19 0915)  SpO2: 98 % (12/08/19 0915) Vital Signs (24h Range):  Temp:  [96.6 °F (35.9 °C)-99.7 °F (37.6 °C)] 99.7 °F (37.6 °C)  Pulse:  [] 112  Resp:  [19-35] 29  SpO2:  [94 %-100 %] 98 %  BP: ()/(42-63) 109/55  Arterial Line BP: ()/(33-75) 78/38     Weight: 129.3 kg (285 lb 0.9 oz)  Body mass index is 36.6 kg/m².    Estimated Creatinine Clearance: 41.3 mL/min (A) (based on SCr of 3.6 mg/dL (H)).    Physical Exam   Constitutional: He is oriented to person, place, and time. He appears ill. No distress.   HENT:   Head: Normocephalic and atraumatic.   Mouth/Throat: No oropharyngeal exudate.   R trialysis   Eyes: Right eye exhibits no discharge. Left eye exhibits no discharge. Scleral icterus is present.   Neck: Normal range of motion. Neck supple.   Cardiovascular: Normal rate and regular rhythm.   Pulmonary/Chest: Effort normal and breath sounds normal.   Abdominal: Soft. Bowel sounds are normal. He exhibits distension. There is no tenderness. There is no guarding.   Musculoskeletal: Normal range of motion. He exhibits edema. He exhibits no tenderness.   Neurological: He is alert and oriented to person, place, and time.    Skin: Skin is warm and dry. No rash noted. He is not diaphoretic.   Striae on abdomen and back   Psychiatric: He has a normal mood and affect. His behavior is normal.       Significant Labs:   CBC:   Recent Labs   Lab 12/07/19  1530 12/07/19  2327 12/08/19 0318   WBC 41.69* 55.62* 54.18*   HGB 7.4* 7.4* 6.3*   HCT 23.9* 23.3* 20.7*   PLT 83* 74* 63*     CMP:   Recent Labs   Lab 12/07/19  0307 12/07/19  1320 12/07/19  2209 12/08/19 0318   * 135* 136 134*  134*   K 5.0 5.4* 5.5* 5.3*  5.3*   CL 95 101 99 98  98   CO2 16* 11* 15* 11*  11*   * 121* 95 98  98   BUN 74* 79* 41* 45*  45*   CREATININE 5.9* 5.6* 3.2* 3.6*  3.6*   CALCIUM 9.1 8.2* 8.5* 8.4*  8.4*   PROT 4.6*  --   --  4.5*   ALBUMIN 2.5* 2.5* 2.7* 2.8*  2.8*   BILITOT 11.7*  --   --  13.3*   ALKPHOS 301*  --   --  258*   *  --   --  222*   ALT 33  --   --  43   ANIONGAP 24* 23* 22* 25*  25*   EGFRNONAA 11.4* 12.2* 24.0* 20.8*  20.8*       Significant Imaging: I have reviewed all pertinent imaging results/findings within the past 24 hours.

## 2019-12-08 NOTE — ASSESSMENT & PLAN NOTE
Unclear at this time etiology of LORENE, but likely from HRS (hyponatremia, low/normal Bps) vs prolonged prerenal or volume depletion after paracentesis 6L removal at prior hospital  - Renal US, no significant abnormalities seen in kidneys  - U/P Cr ratio: 0.23; urine sodium<20  - Central line removed in setting of fungemia. Per primary team and ID, trying to give patient some time without central line; requesting earliest line placement 12/6  - 12/4 developed hyperkalemia to 5.4    Plan/ Recommendations:   - Continue supportive CRRT as tolerated, patient with poor prognosis at this time. Metabolic acidosis secondary to liver failure. Will follow closely.   - MAP >65  - Strict I/Os and chart  - Hb > 7 gm/dL  - Avoid nephrotoxic agents, NSAIDs, IV contrast, etc  - Will follow closely

## 2019-12-08 NOTE — PROGRESS NOTES
Called into room for emergent intubation.  Physician verification done.  Time out completed using proper pt identifiers.    Pt vomited prior to start, mouth immediately suctioned. Dr Blood at bedside.  Intubated per Dr. Sterling Blood with 8.0 oral ETT & bagged with 100% O2 prior to connecting to vent.   Bedside confirms bilateral breath sounds. Secured at 24 at the lip.     11:58  Emergent Bronchoscopy performed by  Dr. Blood & Bernardo Deleon -dept surgery PRG 2.  Pt tolerated procedure.  Will continue to monitor.

## 2019-12-08 NOTE — PROGRESS NOTES
I have personally interviewed and examined the patient. Clinical, laboratorial and imaging information available in medical records were reveiwed by me. I agree with the assessment and recommendations provided by Dr. King who was under my supervision.

## 2019-12-08 NOTE — PLAN OF CARE
POC reviewed with Pt and family. Pt is awake and alert. Pt is disoriented, delusional and hallucinating. Pts O2 sats >95% on 2L NC. Pts HR has been 90s-100s. SBP >100. Levo and vaso titrated for SBP goals. CVP 6,7. CRRT started around 1500. 3 units of PRBCs given for low H/H. 500cc of albumin given. Pts leg are more mottled then yesterday. MD aware. Blood cultures sent. Temp has been 97s all day. Family at bedside. ID consulted. No new skin breakdown. VSS. Will continue to monitor.

## 2019-12-08 NOTE — SUBJECTIVE & OBJECTIVE
Interval History: increasing pressor requirement, oxygen requirement stable, rising WBC, lactate 11    Review of Systems   Constitutional: Negative for activity change, appetite change, chills, fatigue and fever.   HENT: Negative.  Negative for congestion, dental problem, mouth sores and sinus pressure.    Eyes: Negative for photophobia, pain, redness and visual disturbance.   Respiratory: Negative for cough, chest tightness, shortness of breath and wheezing.    Cardiovascular: Negative for chest pain and leg swelling.   Gastrointestinal: Negative for abdominal distention, abdominal pain, constipation, diarrhea, nausea and vomiting.   Endocrine: Negative for polyuria.   Genitourinary: Negative for decreased urine volume, dysuria and frequency.   Musculoskeletal: Negative for joint swelling.   Skin: Negative for color change.   Allergic/Immunologic: Positive for immunocompromised state. Negative for food allergies.   Neurological: Negative for dizziness, weakness and headaches.   Hematological: Negative for adenopathy.   Psychiatric/Behavioral: Negative for agitation and confusion. The patient is not nervous/anxious.      Objective:     Vital Signs (Most Recent):  Temp: 99.7 °F (37.6 °C) (12/08/19 0915)  Pulse: (!) 112 (12/08/19 0915)  Resp: (!) 29 (12/08/19 0915)  BP: (!) 109/55 (12/08/19 0915)  SpO2: 98 % (12/08/19 0915) Vital Signs (24h Range):  Temp:  [96.6 °F (35.9 °C)-99.7 °F (37.6 °C)] 99.7 °F (37.6 °C)  Pulse:  [] 112  Resp:  [19-35] 29  SpO2:  [94 %-100 %] 98 %  BP: ()/(42-63) 109/55  Arterial Line BP: ()/(33-75) 78/38     Weight: 129.3 kg (285 lb 0.9 oz)  Body mass index is 36.6 kg/m².    Estimated Creatinine Clearance: 41.3 mL/min (A) (based on SCr of 3.6 mg/dL (H)).    Physical Exam   Constitutional: He is oriented to person, place, and time. He appears ill. No distress.   HENT:   Head: Normocephalic and atraumatic.   Mouth/Throat: No oropharyngeal exudate.   R trialysis   Eyes: Right  eye exhibits no discharge. Left eye exhibits no discharge. Scleral icterus is present.   Neck: Normal range of motion. Neck supple.   Cardiovascular: Normal rate and regular rhythm.   Pulmonary/Chest: Effort normal and breath sounds normal.   Abdominal: Soft. Bowel sounds are normal. He exhibits distension. There is no tenderness. There is no guarding.   Musculoskeletal: Normal range of motion. He exhibits edema. He exhibits no tenderness.   Neurological: He is alert and oriented to person, place, and time.   Skin: Skin is warm and dry. No rash noted. He is not diaphoretic.   Striae on abdomen and back   Psychiatric: He has a normal mood and affect. His behavior is normal.       Significant Labs:   CBC:   Recent Labs   Lab 12/07/19  1530 12/07/19  2327 12/08/19  0318   WBC 41.69* 55.62* 54.18*   HGB 7.4* 7.4* 6.3*   HCT 23.9* 23.3* 20.7*   PLT 83* 74* 63*     CMP:   Recent Labs   Lab 12/07/19  0307 12/07/19  1320 12/07/19  2209 12/08/19  0318   * 135* 136 134*  134*   K 5.0 5.4* 5.5* 5.3*  5.3*   CL 95 101 99 98  98   CO2 16* 11* 15* 11*  11*   * 121* 95 98  98   BUN 74* 79* 41* 45*  45*   CREATININE 5.9* 5.6* 3.2* 3.6*  3.6*   CALCIUM 9.1 8.2* 8.5* 8.4*  8.4*   PROT 4.6*  --   --  4.5*   ALBUMIN 2.5* 2.5* 2.7* 2.8*  2.8*   BILITOT 11.7*  --   --  13.3*   ALKPHOS 301*  --   --  258*   *  --   --  222*   ALT 33  --   --  43   ANIONGAP 24* 23* 22* 25*  25*   EGFRNONAA 11.4* 12.2* 24.0* 20.8*  20.8*       Significant Imaging: I have reviewed all pertinent imaging results/findings within the past 24 hours.

## 2019-12-08 NOTE — PLAN OF CARE
"Dx: Spontaneous bacterial peritonitis    Shift Events: Pt increasingly confused. At beginning of shift, pt was oriented to self and time but disoriented to place and situation. Pt is now only oriented to self. Increased need of pressor support, especially on CRRT. Lactic acid level 12.97, 750 mL albumin given at 250 mL intervals, lactic level 12.06. 2 PRBC ordered r/t H&H drop to 6.3 and 20.7. WBC increased to 55.62, MD aware. CRRT stopped r/t hypotension but restarted r/t lactic acid increase and bicarb decrease, 2 amp bicarb given and dialysate bath increased from 30 to 40. Harish warmer applied for core rectal temp of 96. Oxygen increased from 2L NC to 3L NC.    Neuro: Moves All Extremities and Confused    Vital Signs: BP (!) 103/53   Pulse 108   Temp 97.9 °F (36.6 °C) (Rectal)   Resp (!) 32   Ht 6' 2" (1.88 m)   Wt 129.3 kg (285 lb 0.9 oz)   SpO2 95%   BMI 36.60 kg/m²     Diet: Regular Diet    Gtts: Norepinephrine and Vasopressin    Urine Output: Anuric    Labs/Accuchecks: Lactic q4h (recommended to use istat with ABG until Lactic <12)    Skin: Abdomen and arms ecchymotic, old paracentesis site on abdomen, legs mottled       "

## 2019-12-08 NOTE — PROCEDURES
"Luis Hurd is a 34 y.o. male patient.    Temp: 100 °F (37.8 °C) (12/08/19 1303)  Pulse: (!) 123 (12/08/19 1303)  Resp: (!) 22 (12/08/19 1303)  BP: (!) 99/54 (12/08/19 1303)  SpO2: 96 % (12/08/19 1303)  Weight: 129.3 kg (285 lb 0.9 oz) (12/04/19 1000)  Height: 6' 2" (188 cm) (12/04/19 1000)       Central Line  Date/Time: 12/8/2019 2:37 PM  Location procedure was performed: [facility] CRITICAL CARE SURGERY  Performed by: Sterling Blood MD  Pre-operative Diagnosis: shock  Post-operative diagnosis: shock  Consent Done: Emergent Situation  Time out: Immediately prior to procedure a "time out" was called to verify the correct patient, procedure, equipment, support staff and site/side marked as required.  Indications: med administration and vascular access  Anesthesia: local infiltration    Anesthesia:  Local anesthesia used: yes  Local Anesthetic: lidocaine 1% without epinephrine  Preparation: skin prepped with ChloraPrep  Skin prep agent dried: skin prep agent completely dried prior to procedure  Sterile barriers: all five maximum sterile barriers used - cap, mask, sterile gown, sterile gloves, and large sterile sheet  Hand hygiene: hand hygiene performed prior to central venous catheter insertion  Location details: left femoral  Site selection rationale: ESRD  Catheter type: triple lumen  Catheter size: 7 Fr  Catheter Length: 20cm    Ultrasound guidance: yes  Vessel Caliber: patent, compressibility normal  Needle advanced into vessel with real time Ultrasound guidance.  Guidewire confirmed in vessel.  Sterile sheath used.  Manometry: Yes  Number of attempts: 1  Complications: none  Specimens: No  Implants: No  Post-procedure: line sutured,  chlorhexidine patch,  sterile dressing applied and blood return through all ports  Complications: No          Sterling Blood  12/8/2019  "

## 2019-12-08 NOTE — SUBJECTIVE & OBJECTIVE
Past Medical History:   Diagnosis Date    Acute pancreatitis 11/26/2019    Alcoholic cirrhosis of liver with ascites 11/26/2019    Severe alcohol dependence 11/26/2019    Spontaneous bacterial peritonitis 11/25/2019       History reviewed. No pertinent surgical history.    Review of patient's allergies indicates:  No Known Allergies    Family History     None        Tobacco Use    Smoking status: Never Smoker    Smokeless tobacco: Never Used   Substance and Sexual Activity    Alcohol use: Not on file    Drug use: Not on file    Sexual activity: Not on file      Review of Systems   Constitutional: Positive for fatigue. Negative for chills and fever.   HENT: Negative.    Respiratory: Positive for shortness of breath. Negative for cough and chest tightness.    Cardiovascular: Positive for leg swelling. Negative for chest pain.   Gastrointestinal: Positive for abdominal distention and abdominal pain. Negative for constipation, nausea and vomiting.   Endocrine: Negative.    Genitourinary: Negative.    Musculoskeletal: Negative.    Skin: Positive for color change.   Allergic/Immunologic: Negative.    Neurological: Negative.      Objective:     Vital Signs (Most Recent):  Temp: 97.9 °F (36.6 °C) (12/08/19 0530)  Pulse: 108 (12/08/19 0530)  Resp: (!) 32 (12/08/19 0530)  BP: (!) 103/53 (12/08/19 0530)  SpO2: 95 % (12/08/19 0530) Vital Signs (24h Range):  Temp:  [96.6 °F (35.9 °C)-98.4 °F (36.9 °C)] 97.9 °F (36.6 °C)  Pulse:  [] 108  Resp:  [19-33] 32  SpO2:  [94 %-100 %] 95 %  BP: ()/(42-78) 103/53  Arterial Line BP: ()/(34-75) 75/40     Weight: 129.3 kg (285 lb 0.9 oz)  Body mass index is 36.6 kg/m².      Intake/Output Summary (Last 24 hours) at 12/8/2019 0722  Last data filed at 12/8/2019 0500  Gross per 24 hour   Intake 5268.05 ml   Output 1481 ml   Net 3787.05 ml       Physical Exam   Constitutional: He is oriented to person, place, and time. No distress.   HENT:   Head: Normocephalic.    Eyes: Pupils are equal, round, and reactive to light. Scleral icterus is present.   Neck: Neck supple.   Cardiovascular: Normal rate, regular rhythm, normal heart sounds and intact distal pulses.   Pulmonary/Chest: Effort normal and breath sounds normal. No respiratory distress.   Abdominal: He exhibits distension. There is tenderness. There is no rebound and no guarding.   Neurological: He is oriented to person, place, and time.   Skin: Skin is warm and dry. He is not diaphoretic.   Psychiatric: He has a normal mood and affect. His behavior is normal.   Nursing note and vitals reviewed.      Vents:       Lines/Drains/Airways     Central Venous Catheter Line                 Trialysis (Dialysis) Catheter 12/05/19 1429 right internal jugular 2 days          Arterial Line                 Arterial Line 12/05/19 1544 Right Radial 2 days          Peripheral Intravenous Line                 Peripheral IV - Single Lumen 12/03/19 1100 18 G Anterior;Right Upper Arm 4 days                Significant Labs:    CBC/Anemia Profile:  Recent Labs   Lab 12/07/19  1530 12/07/19  2327 12/08/19  0318   WBC 41.69* 55.62* 54.18*   HGB 7.4* 7.4* 6.3*   HCT 23.9* 23.3* 20.7*   PLT 83* 74* 63*   * 106* 109*   RDW 21.0* 22.6* 23.5*        Chemistries:  Recent Labs   Lab 12/07/19  0307 12/07/19  1320 12/07/19  2209 12/08/19  0318   * 135* 136 134*  134*   K 5.0 5.4* 5.5* 5.3*  5.3*   CL 95 101 99 98  98   CO2 16* 11* 15* 11*  11*   BUN 74* 79* 41* 45*  45*   CREATININE 5.9* 5.6* 3.2* 3.6*  3.6*   CALCIUM 9.1 8.2* 8.5* 8.4*  8.4*   ALBUMIN 2.5* 2.5* 2.7* 2.8*  2.8*   PROT 4.6*  --   --  4.5*   BILITOT 11.7*  --   --  13.3*   ALKPHOS 301*  --   --  258*   ALT 33  --   --  43   *  --   --  222*   MG 2.6 2.4 2.3 2.4  2.4   PHOS 6.3* 6.2* 4.6* 5.5*  5.5*       All pertinent labs within the past 24 hours have been reviewed.    Significant Imaging: I have reviewed all pertinent imaging results/findings within the past  24 hours.

## 2019-12-08 NOTE — PROGRESS NOTES
CRRT:    SLED restarted due to elevated lactic.  Bicarb parameters increased to 40  UF rate set to 200ml/hr NET UF 0  Daily checks done.

## 2019-12-08 NOTE — ASSESSMENT & PLAN NOTE
35 y/o M PMhx alcoholic cirrhosis c/b ascites on diuretics, morbid obesity who presented to OSH 11/20 w/ worsening abdominal pain and distension with LE edema s/p transfer to [facility] for liver txp evaluation and LORENE. Patient was empirically started on ceftriaxone for SBP. Afterwards underwent paracentesis 11/21 w/ removal 6.2L (820 segs (816 corrected)) & given albumin. CT abd/pelvis w/ IV cont 11/21 revealed mild diffuse fluid or stranding about pancreas & lipase 700. Consequently diagnosed w/ pancreatitis. He subsequently developed LORENE and he was started on treatment for presumed HRS. Given worsening leukocytosis (16.5->27) ID was consulted and antibiotics broadened to meropenem. Patient transferred to [facility] for liver transplant evaluation.  hospital course complicated by peritonitis s/p course of abx w/ transition to SBP ppx, candida glabrata fungemia (blood cx 11/29 in 4/4 bottles, 12/2 NGTD, 12/3 in 1/4 bottles, 12/5 NGTD), (ascites cx 11/26, 11/29, 12/3 all NGTD), LORENE requiring CRRT, and hypotension requiring pressors. ID is re-consulted for persistent fungemia prior to transplantation. TTE 11/29 unremarkable. No signs of fungal endophthalmitis on exam 12/2 per ophtho.    Recommendations  C/w micafungin and empiric meropenem for now  F/u repeat blood cultures   Recommend CT abd/pelvis to r/o intra-abdominal source  Recommend repeat paracentesis w/ cell count w/ diff and cultures  Would repeat TTE to r/o vegetation  C/w SBP prophylaxis  Hold off transplantation given patient still w/ fungemia  Discussed with transplant surgery will follow

## 2019-12-08 NOTE — PROGRESS NOTES
[facility]  Nephrology  Progress Note    Patient Name: Luis Hurd  MRN: [id number]  Admission Date: 11/25/2019  Hospital Length of Stay: 13 days  Attending Provider: Eugenio Nettles MD   Primary Care Physician: William Nettles MD  Principal Problem:Spontaneous bacterial peritonitis    Subjective:     HPI: 33 y/o man with hx of alcohol abuse, alcoholic cirrhosis decompensated with ascites,  morbid obesity, no history of CKD baseline sCr 0.7 (as of 11/20/2019), admitted on 11/25/2019 as a transfer from OSH for liver txp evaluation and LORENE, concern for HRS.  Originally presented on 11/20 to OSH with worsening abdominal pain and distention, along with increased lower extremity edema, and also diagnosed with SBP and pancreatitis.  Last drink ~1 month ago (started drinking ~15 yrs ago, with 2-3 wine bottles/day).  On admit, sCr 0.7, had paracentesis with ~6L removal, administered albumin post procedure, no report of acute hypotension during previous hospital stay, yet subsequently developed rise in sCr to 1.63 on 11/22, 2.39 on 11/23, 2.44 on 11/25, and labs on arrival to [facility] on 11/25 up to 3.1.  He was initiated in HRS treatment, receiving albumin, octreotide, and midodrine.  Ofr SBP started on ceftriaxone.      Nephrology consulted for evaluation of Lorene.      Interval History: Patient critically ill with high pressor support. Multiple PRBC's transfusion in the past 24 hrs.      Review of patient's allergies indicates:  No Known Allergies  Current Facility-Administered Medications   Medication Frequency    0.9%  NaCl infusion (CRRT USE ONLY) Continuous    0.9%  NaCl infusion (for blood administration) Q24H PRN    acetaminophen tablet 325 mg Q4H PRN    bisacodyl suppository 10 mg Daily PRN    calcium carbonate 200 mg calcium (500 mg) chewable tablet 500 mg TID PRN    cefTRIAXone injection 1 g Q24H    dextrose 10% (D10W) Bolus PRN    dextrose 10% (D10W) Bolus PRN    folic acid tablet 1 mg  Daily    glucagon (human recombinant) injection 1 mg PRN    glucose chewable tablet 16 g PRN    glucose chewable tablet 24 g PRN    hepatitis B (HEPLISAV-B) 20 mcg/0.5 mL vaccine 0.5 mL vaccine x 1 dose    magnesium sulfate 2g in water 50mL IVPB (premix) PRN    micafungin 100 mg in sodium chloride 0.9 % 100 mL IVPB (ready to mix system) Q24H    midodrine tablet 15 mg TID    norepinephrine 4 mg in dextrose 5% 250 mL infusion (premix) (titrating) Continuous    ondansetron injection 4 mg Q6H PRN    oxyCODONE immediate release tablet 5 mg Q6H PRN    polyethylene glycol packet 17 g Daily    promethazine (PHENERGAN) 12.5 mg in dextrose 5 % 50 mL IVPB Q6H PRN    senna-docusate 8.6-50 mg per tablet 1 tablet BID    sodium chloride 0.9% flush 10 mL PRN    sodium phosphate 20.01 mmol in dextrose 5 % 250 mL IVPB PRN    sodium phosphate 30 mmol in dextrose 5 % 250 mL IVPB PRN    sodium phosphate 39.99 mmol in dextrose 5 % 250 mL IVPB PRN    thiamine tablet 100 mg Daily    traMADol tablet 50 mg Q6H PRN    vasopressin (PITRESSIN) 0.2 Units/mL in dextrose 5 % 100 mL infusion Continuous       Objective:     Vital Signs (Most Recent):  Temp: 99 °F (37.2 °C) (12/08/19 0745)  Pulse: 107 (12/08/19 0745)  Resp: (!) 29 (12/08/19 0745)  BP: (!) 89/43 (12/08/19 0745)  SpO2: 96 % (12/08/19 0745)  O2 Device (Oxygen Therapy): nasal cannula (12/08/19 0735) Vital Signs (24h Range):  Temp:  [96.6 °F (35.9 °C)-99 °F (37.2 °C)] 99 °F (37.2 °C)  Pulse:  [] 107  Resp:  [19-33] 29  SpO2:  [94 %-100 %] 96 %  BP: ()/(42-78) 89/43  Arterial Line BP: ()/(33-75) 64/33     Weight: 129.3 kg (285 lb 0.9 oz) (12/04/19 1000)  Body mass index is 36.6 kg/m².  Body surface area is 2.6 meters squared.    I/O last 3 completed shifts:  In: 6132.3 [I.V.:5591.3; Blood:491; IV Piggyback:50]  Out: 1673 [Other:1673]    Physical Exam   Constitutional:   Critically ill   Cardiovascular: Normal rate.   tachycardic   Pulmonary/Chest:  Effort normal and breath sounds normal.   Abdominal: Soft. He exhibits distension.   Musculoskeletal: Normal range of motion. He exhibits no edema.   Neurological: He is alert.   Confused   Skin: He is diaphoretic.   Multiple telangiectatic lesions on chest, purpura on abdomen   Vitals reviewed.      Significant Labs:  CBC:   Recent Labs   Lab 12/08/19 0318   WBC 54.18*   RBC 1.90*   HGB 6.3*   HCT 20.7*   PLT 63*   *   MCH 33.2*   MCHC 30.4*     CMP:   Recent Labs   Lab 12/08/19 0318   GLU 98  98   CALCIUM 8.4*  8.4*   ALBUMIN 2.8*  2.8*   PROT 4.5*   *  134*   K 5.3*  5.3*   CO2 11*  11*   CL 98  98   BUN 45*  45*   CREATININE 3.6*  3.6*   ALKPHOS 258*   ALT 43   *   BILITOT 13.3*     All labs within the past 24 hours have been reviewed.     Significant Imaging:  X-Ray: Reviewed  Personally reviewed    Assessment/Plan:     Acute renal failure with tubular necrosis  Unclear at this time etiology of LORENE, but likely from HRS (hyponatremia, low/normal Bps) vs prolonged prerenal or volume depletion after paracentesis 6L removal at prior hospital  - Renal US, no significant abnormalities seen in kidneys  - U/P Cr ratio: 0.23; urine sodium<20  - Central line removed in setting of fungemia. Per primary team and ID, trying to give patient some time without central line; requesting earliest line placement 12/6  - 12/4 developed hyperkalemia to 5.4    Plan/ Recommendations:   - Continue supportive CRRT as tolerated, patient with poor prognosis at this time. Metabolic acidosis secondary to liver failure. Will follow closely.   - MAP >65  - Strict I/Os and chart  - Hb > 7 gm/dL  - Avoid nephrotoxic agents, NSAIDs, IV contrast, etc  - Will follow closely      Decompensated hepatic cirrhosis  -Per primary team.        Thank you for your consult. I will follow-up with patient. Please contact us if you have any additional questions.    Conor King MD  Nephrology  [facility]  [place]

## 2019-12-09 NOTE — PROGRESS NOTES
Dr. Agustin notified and called to bedside that pt is maxed out on vaso, epi, and levo with MAPs in the low 50's. Dr. Agustin spoke with family explaining the seriousness and mortality of situation. Family has made the descion with stop all medical care and convert to comfort care. Matewan at bedside to speak to family about next steps.

## 2019-12-09 NOTE — PROGRESS NOTES
Comfort measures initiated. Pt given fentanyl for pain control and propofol drip continued at 30 mcg/kg/min for sedation. All other supportive drips turned off. Respiratory at bedside. Pt passed within minutes of discontinuing supportive measures. Dr. Agustin called to bedside to pronounce. Strip printed. TOSELMA called at 2929.

## 2019-12-09 NOTE — DISCHARGE SUMMARY
[facility]  Critical Care - Surgery  Discharge Summary      Patient Name: Luis Hurd  MRN: [id number]  Admission Date: 11/25/2019  Hospital Length of Stay: 13 days  Discharge Date and Time:  12/08/2019 10:41 PM  Attending Physician: Eugenio Nettles MD   Discharging Provider: Bernardo Agustin MD  Primary Care Provider: William Nettles MD    HPI:  Patient is a 33 yo male with pmh of etoh abuse, alcoholic cirrhosis, history of morbid obesity, who was admitted to OSH 1 week ago for abd pain and LE edema.   Initial evaluation consistent with SBP and a pancreatitis, however patient did not improve with IV ceftriaxone or pentoxifylline with worsening leukocytosis and kidney function.   Transferred to [facility] for further evaluation.  Stepped up to SICU needing central access for CRRT as well as closer BP monitoring.      * No surgery found *    Indwelling Lines/Drains at Time of Discharge:   Lines/Drains/Airways     Central Venous Catheter Line                 Trialysis (Dialysis) Catheter 12/05/19 1429 right internal jugular 3 days         Percutaneous Central Line Insertion/Assessment - triple lumen  12/08/19 1430 left femoral vein less than 1 day          Drain                 NG/OG Tube 12/08/19 1150 nasogastric Left nostril less than 1 day          Airway                 Airway - Non-Surgical 12/08/19 1145 Endotracheal Tube less than 1 day          Arterial Line                 Arterial Line 12/05/19 1544 Right Radial 3 days                Hospital Course:  Patient with pre-liver status in fulminant liver failure being managed in the SICU awaiting liver transplant listing. Over course of day, patient had increased work of breathing and septic clinical picture requiring additional imaging. Decision made to intubate at which time large volume aspiration occurred. Following this, patient severely decompensated requiring max dose pressor and inotropic support with continued hypotension and  lactic acidosis. All heroic measures were pursued per the patient's advanced directive including PPI and octreotide gtt for UGI bleeding, CRRT which failed due to hemodynamic instability, and max dose bicarbonate gtt. Despite these efforts, patient continued to decompensate. As it became evident that our measures had become futile, comfort measures were initiated. Very quickly, patient's rhythm was found to be asystole with no detectable blood pressure. No heart tones auscultated on exam and pupils were fix and dilated. No spontaneous respirations or gag reflex appreciated. At 2215, patient was pronounced . Cause of death multisystem organ failure.      Consults (From admission, onward)        Status Ordering Provider     Inpatient consult to Hepatology  Once     Provider:  (Not yet assigned)    Completed OBI HADLEY     Inpatient consult to Infectious Diseases  Once     Provider:  (Not yet assigned)    Completed RAMIREZ CRISTINA     Inpatient consult to Infectious Diseases  Once     Provider:  (Not yet assigned)    Completed ALLYSON POWELL     Inpatient consult to Midline team  Once     Provider:  (Not yet assigned)    Completed OBI HADLEY     Inpatient consult to Midline team  Once     Provider:  (Not yet assigned)    Completed RAMIREZ CRISTINA     Inpatient consult to Nephrology  Once     Provider:  (Not yet assigned)    Completed OBI HADLEY     Inpatient consult to Ophthalmology  Once     Provider:  (Not yet assigned)    Completed RAMIREZ CRISTINA     Inpatient consult to Psychiatry  Once     Provider:  (Not yet assigned)    Completed OBI HADLEY     Inpatient consult to Registered Dietitian/Nutritionist  Once     Provider:  (Not yet assigned)    Completed RAMIREZ CRISTINA          Significant Labs:  See chart    Significant Imaging:  See chart    Pending Diagnostic Studies:     Procedure Component Value Units Date/Time    Creatinine, urine, random [[id number]] Collected:  19  2300    Order Status:  Sent Lab Status:  No result     Specimen:  Urine, Clean Catch     Protein / creatinine ratio, urine [[id number]] Collected:  11/25/19 2300    Order Status:  Sent Lab Status:  No result     Specimen:  Urine, Clean Catch     Sodium, urine, random [[id number]] Collected:  11/25/19 2300    Order Status:  Sent Lab Status:  No result     Specimen:  Urine, Clean Catch     Urea nitrogen, urine [[id number]] Collected:  11/25/19 2300    Order Status:  Sent Lab Status:  No result     Specimen:  Urine, Clean Catch     Urinalysis, Reflex to Urine Culture Urine, Clean Catch [[id number]] Collected:  11/25/19 2300    Order Status:  Sent Lab Status:  No result     Specimen:  Urine     Vancomycin, random [[id number]] Collected:  11/30/19 0553    Order Status:  Sent Lab Status:  In process Updated:  11/30/19 0554    Specimen:  Blood     Yee's Stain, Urine Random [[id number]] Collected:  11/25/19 2300    Order Status:  Sent Lab Status:  No result     Specimen:  Urine, Clean Catch         Final Active Diagnoses:    Diagnosis Date Noted POA    PRINCIPAL PROBLEM:  Spontaneous bacterial peritonitis [K65.2] 11/25/2019 Yes    Liver transplant candidate [Z76.82] 12/05/2019 Not Applicable    Hyperkalemia [E87.5] 12/05/2019 No    Hypotension [I95.9] 12/05/2019 No    LORENE (acute kidney injury) [N17.9]  Yes    Infection due to Candida glabrata [B37.9] 12/01/2019 Yes    Candidemia [B37.7] 12/01/2019 Yes    Acute liver failure without hepatic coma [K72.00] 11/27/2019 Yes    Hepatorenal syndrome [K76.7] 11/26/2019 Yes    Decompensated hepatic cirrhosis [K72.90] 11/26/2019 Yes    Alcoholic cirrhosis of liver with ascites [K70.31] 11/26/2019 Yes    Coagulopathy [D68.9] 11/26/2019 Yes    Severe sepsis with acute organ dysfunction [A41.9, R65.20] 11/26/2019 Yes    Anemia of chronic disease [D63.8] 11/26/2019 Yes    Hyponatremia [E87.1] 11/26/2019 Yes    Alcoholic hepatitis with ascites [K70.11] 11/26/2019 Yes     Severe alcohol dependence [F10.20] 2019 Yes    Acute pancreatitis [K85.90] 2019 Yes    Acute renal failure with tubular necrosis [N17.0] 2019 Yes    Macrocytic anemia [D53.9] 2019 Yes    Moderate malnutrition [E44.0] 2019 Yes    Encounter for pre-transplant evaluation for liver transplant [Z01.818] 2019 Not Applicable      Problems Resolved During this Admission:       Discharged Condition:     Disposition:       Bernardo Agustin MD  Critical Care - Surgery  [facility]

## 2019-12-09 NOTE — CARE UPDATE
Patient's status continues to worsen. ICU monitoring continues.   HR has been variable throughout the shift. 12 lead EKG obtained confirming a-fib w/RVR. Patient self converted to ST.   Copious gastric content produced from OG tube.   Roughly 5L gastric contents produced upon induction.  Pt emergently bronched at bedside following intubation.   D10 infusion started for recurrent hypoglycemia.   Harish Huggar initiated for hypothermia via rectal probe.   Vasopressor requirements have increased over the day.   Lactic acidosis continues to worsen. CRRT planned for restart to help clearance.   Additional central access obtained at L femoral vein. ART line and cuff pressures continue to not corollate. Second ART line attempt aborted due to coagulapathy.   Patient transfusion includes:    6 units PRBCs   1 cryoprecipitate    1 FFP   1L LR bolus x6 also administered.  Family has been consistently updated on patient's poor prognosis by primary team.

## 2019-12-09 NOTE — SIGNIFICANT EVENT
Significant Event:    Patient with pre-liver status in fulminant liver failure being managed in the SICU awaiting liver transplant listing. Over course of day, patient had increased work of breathing and septic clinical picture requiring additional imaging. Decision made to intubate at which time large volume aspiration occurred. Following this, patient severely decompensated requiring max dose pressor and inotropic support with continued hypotension and lactic acidosis. All heroic measures were pursued per the patient's advanced directive including PPI and octreotide gtt for UGI bleeding, CRRT which failed due to hemodynamic instability, and max dose bicarbonate gtt. Despite these efforts, patient continued to decompensate. As it became evident that our measures had become futile, comfort measures were initiated. Very quickly, patient's rhythm was found to be asystole with no detectable blood pressure. No heart tones auscultated on exam and pupils were fix and dilated. No spontaneous respirations or gag reflex appreciated. At 221, patient was pronounced . Cause of death multisystem organ failure.     Time of death:     MICHELET Mcguire  Surgery

## 2019-12-09 NOTE — PHYSICIAN QUERY
"PT Name: Luis Hurd  MR #: [id number]    Physician Query Form - Hematology Clarification     CDS/: Chito CUNNINGHAM, RN-BC  Contact information: [email or web address]    This form is a permanent document in the medical record.     Query Date: December 9, 2019    By submitting this query, we are merely seeking further clarification of documentation. Please utilize your independent clinical judgment when addressing the question(s) below.    The Medical record contains the following:     Indicators   Supporting Clinical Findings   Location in Medical Record    "Thrombocytopenia" documented      X Platelets 190->109->126->71->68->98->74->31 11/25-11/27-11/29-12/1-12/3-12/5-12/7-12/8 LABS    Acute bleeding, Petechiae, Bruising      Patient on anticoagulant medication     X Transfusion(s) 4 units PRBC's   One dose of cryoprecipitate  One unit of FFP's   Blood Bank 12/7/19   X Treatments:  phytonadione vitamin k (AQUA-MEPHYTON) 10 mg x3 doses MAR 11/26 and 11/27   X Other:  # Coagulopathy due to liver disease  - DIC labs  - vitamin K for 3 days  Critical Care Surgery, PN, Dr. Jacobsen/Dr. Brunson, 12/8/19     Provider, please specify diagnosis or diagnoses associated with above clinical findings.    [   ] Primary thrombocytopenia   [ x  ] Secondary thrombocytopenia related to (please specify) liver disease   [   ] Unspecified thrombocytopenia   [   ] Other hematological diagnosis (please specify)   [  ] Clinically Undetermined         Please document in your progress notes daily for the duration of treatment, until resolved, and include in your discharge summary.                                                                                                                                                                                                 "

## 2019-12-10 LAB
BACTERIA BLD CULT: NORMAL
BACTERIA BLD CULT: NORMAL

## 2019-12-12 LAB
BACTERIA BLD CULT: NORMAL
BACTERIA BLD CULT: NORMAL

## 2019-12-13 LAB
BACTERIA BLD CULT: ABNORMAL
FUNGAL SUSC PNL ISLT: ABNORMAL
SPECIMEN SOURCE AND ORGANISM ID: ABNORMAL

## 2019-12-28 LAB — FUNGUS SPEC CULT: NORMAL

## 2020-01-07 LAB — FUNGUS SPEC CULT: NORMAL

## 2020-02-04 LAB
ACID FAST MOD KINY STN SPEC: NORMAL
MYCOBACTERIUM SPEC QL CULT: NORMAL
